# Patient Record
Sex: FEMALE | Race: WHITE | NOT HISPANIC OR LATINO
[De-identification: names, ages, dates, MRNs, and addresses within clinical notes are randomized per-mention and may not be internally consistent; named-entity substitution may affect disease eponyms.]

---

## 2017-01-06 ENCOUNTER — MESSAGE (OUTPATIENT)
Age: 77
End: 2017-01-06

## 2017-01-09 ENCOUNTER — APPOINTMENT (OUTPATIENT)
Dept: ENDOCRINOLOGY | Facility: CLINIC | Age: 77
End: 2017-01-09

## 2017-01-09 VITALS
HEART RATE: 69 BPM | WEIGHT: 122.2 LBS | SYSTOLIC BLOOD PRESSURE: 114 MMHG | BODY MASS INDEX: 24.64 KG/M2 | DIASTOLIC BLOOD PRESSURE: 70 MMHG | HEIGHT: 59 IN

## 2017-01-10 ENCOUNTER — MESSAGE (OUTPATIENT)
Age: 77
End: 2017-01-10

## 2017-03-20 ENCOUNTER — RX RENEWAL (OUTPATIENT)
Age: 77
End: 2017-03-20

## 2017-05-15 ENCOUNTER — NON-APPOINTMENT (OUTPATIENT)
Age: 77
End: 2017-05-15

## 2017-05-15 ENCOUNTER — APPOINTMENT (OUTPATIENT)
Dept: INTERNAL MEDICINE | Facility: CLINIC | Age: 77
End: 2017-05-15

## 2017-05-15 VITALS — SYSTOLIC BLOOD PRESSURE: 160 MMHG | HEART RATE: 64 BPM | DIASTOLIC BLOOD PRESSURE: 70 MMHG

## 2017-05-15 VITALS
TEMPERATURE: 98.2 F | OXYGEN SATURATION: 99 % | BODY MASS INDEX: 24.19 KG/M2 | WEIGHT: 120 LBS | HEIGHT: 59 IN | HEART RATE: 72 BPM | DIASTOLIC BLOOD PRESSURE: 70 MMHG | SYSTOLIC BLOOD PRESSURE: 140 MMHG

## 2017-05-15 VITALS — HEART RATE: 64 BPM | SYSTOLIC BLOOD PRESSURE: 120 MMHG | DIASTOLIC BLOOD PRESSURE: 70 MMHG

## 2017-05-18 ENCOUNTER — APPOINTMENT (OUTPATIENT)
Dept: NEUROLOGY | Facility: CLINIC | Age: 77
End: 2017-05-18

## 2017-05-23 ENCOUNTER — CHART COPY (OUTPATIENT)
Age: 77
End: 2017-05-23

## 2017-05-23 LAB
APPEARANCE: CLEAR
BACTERIA: NEGATIVE
BILIRUBIN URINE: NEGATIVE
BLOOD URINE: ABNORMAL
COLOR: YELLOW
GLUCOSE QUALITATIVE U: NORMAL
HYALINE CASTS: 5 /LPF
KETONES URINE: NEGATIVE
LEUKOCYTE ESTERASE URINE: NEGATIVE
MICROSCOPIC-UA: NORMAL
NITRITE URINE: NEGATIVE
PH URINE: 5
PROTEIN URINE: NEGATIVE
RED BLOOD CELLS URINE: 7 /HPF
SPECIFIC GRAVITY URINE: 1.01
SQUAMOUS EPITHELIAL CELLS: 3 /HPF
UROBILINOGEN URINE: <2
WHITE BLOOD CELLS URINE: 4 /HPF

## 2017-05-26 ENCOUNTER — OTHER (OUTPATIENT)
Age: 77
End: 2017-05-26

## 2017-05-30 LAB
APPEARANCE: CLEAR
BACTERIA: NEGATIVE
BILIRUBIN URINE: NEGATIVE
BLOOD URINE: NEGATIVE
COLOR: YELLOW
CORE LAB FLUID CYTOLOGY: NORMAL
GLUCOSE QUALITATIVE U: NORMAL MG/DL
HYALINE CASTS: 1 /LPF
KETONES URINE: NEGATIVE
LEUKOCYTE ESTERASE URINE: NEGATIVE
MICROSCOPIC-UA: NORMAL
NITRITE URINE: NEGATIVE
PH URINE: 7.5
PROTEIN URINE: NEGATIVE MG/DL
RED BLOOD CELLS URINE: 6 /HPF
SPECIFIC GRAVITY URINE: 1.02
SQUAMOUS EPITHELIAL CELLS: 1 /HPF
UROBILINOGEN URINE: NORMAL MG/DL
WHITE BLOOD CELLS URINE: 1 /HPF

## 2017-06-02 ENCOUNTER — MEDICATION RENEWAL (OUTPATIENT)
Age: 77
End: 2017-06-02

## 2017-06-07 ENCOUNTER — APPOINTMENT (OUTPATIENT)
Dept: ENDOCRINOLOGY | Facility: CLINIC | Age: 77
End: 2017-06-07

## 2017-06-07 VITALS — BODY MASS INDEX: 23.9 KG/M2 | WEIGHT: 117 LBS | HEIGHT: 58.5 IN

## 2017-06-07 VITALS — SYSTOLIC BLOOD PRESSURE: 138 MMHG | OXYGEN SATURATION: 98 % | HEART RATE: 60 BPM | DIASTOLIC BLOOD PRESSURE: 70 MMHG

## 2017-06-16 ENCOUNTER — MESSAGE (OUTPATIENT)
Age: 77
End: 2017-06-16

## 2017-06-20 ENCOUNTER — MESSAGE (OUTPATIENT)
Age: 77
End: 2017-06-20

## 2017-06-26 ENCOUNTER — APPOINTMENT (OUTPATIENT)
Dept: UROLOGY | Facility: CLINIC | Age: 77
End: 2017-06-26

## 2017-06-26 VITALS
DIASTOLIC BLOOD PRESSURE: 84 MMHG | SYSTOLIC BLOOD PRESSURE: 150 MMHG | WEIGHT: 120 LBS | BODY MASS INDEX: 24.19 KG/M2 | HEIGHT: 59 IN | HEART RATE: 66 BPM | RESPIRATION RATE: 16 BRPM

## 2017-07-11 ENCOUNTER — MEDICATION RENEWAL (OUTPATIENT)
Age: 77
End: 2017-07-11

## 2017-07-11 ENCOUNTER — FORM ENCOUNTER (OUTPATIENT)
Age: 77
End: 2017-07-11

## 2017-07-12 ENCOUNTER — APPOINTMENT (OUTPATIENT)
Dept: UROLOGY | Facility: CLINIC | Age: 77
End: 2017-07-12

## 2017-07-12 ENCOUNTER — APPOINTMENT (OUTPATIENT)
Dept: CT IMAGING | Facility: IMAGING CENTER | Age: 77
End: 2017-07-12

## 2017-07-12 ENCOUNTER — OUTPATIENT (OUTPATIENT)
Dept: OUTPATIENT SERVICES | Facility: HOSPITAL | Age: 77
LOS: 1 days | End: 2017-07-12
Payer: MEDICARE

## 2017-07-12 ENCOUNTER — OUTPATIENT (OUTPATIENT)
Dept: OUTPATIENT SERVICES | Facility: HOSPITAL | Age: 77
LOS: 1 days | End: 2017-07-12

## 2017-07-12 VITALS — SYSTOLIC BLOOD PRESSURE: 94 MMHG | HEART RATE: 76 BPM | DIASTOLIC BLOOD PRESSURE: 59 MMHG | TEMPERATURE: 97.7 F

## 2017-07-12 DIAGNOSIS — R31.29 OTHER MICROSCOPIC HEMATURIA: ICD-10-CM

## 2017-07-12 DIAGNOSIS — R35.0 FREQUENCY OF MICTURITION: ICD-10-CM

## 2017-07-12 PROCEDURE — 52000 CYSTOURETHROSCOPY: CPT

## 2017-07-12 PROCEDURE — 82565 ASSAY OF CREATININE: CPT

## 2017-07-12 PROCEDURE — 74178 CT ABD&PLV WO CNTR FLWD CNTR: CPT

## 2017-07-20 ENCOUNTER — MEDICATION RENEWAL (OUTPATIENT)
Age: 77
End: 2017-07-20

## 2017-07-27 ENCOUNTER — CLINICAL ADVICE (OUTPATIENT)
Age: 77
End: 2017-07-27

## 2017-07-27 DIAGNOSIS — R31.29 OTHER MICROSCOPIC HEMATURIA: ICD-10-CM

## 2017-08-21 ENCOUNTER — APPOINTMENT (OUTPATIENT)
Dept: INTERNAL MEDICINE | Facility: CLINIC | Age: 77
End: 2017-08-21
Payer: MEDICARE

## 2017-08-21 VITALS — HEART RATE: 76 BPM | SYSTOLIC BLOOD PRESSURE: 160 MMHG | DIASTOLIC BLOOD PRESSURE: 80 MMHG

## 2017-08-21 VITALS
DIASTOLIC BLOOD PRESSURE: 80 MMHG | SYSTOLIC BLOOD PRESSURE: 140 MMHG | HEART RATE: 70 BPM | HEIGHT: 58.5 IN | TEMPERATURE: 98.2 F | WEIGHT: 122 LBS | BODY MASS INDEX: 24.93 KG/M2 | OXYGEN SATURATION: 98 %

## 2017-08-21 VITALS — HEART RATE: 76 BPM | DIASTOLIC BLOOD PRESSURE: 70 MMHG | SYSTOLIC BLOOD PRESSURE: 164 MMHG

## 2017-08-21 PROCEDURE — 99214 OFFICE O/P EST MOD 30 MIN: CPT

## 2017-08-21 RX ORDER — CALCIUM CARBONATE 500 MG/1
500 TABLET, CHEWABLE ORAL DAILY
Qty: 60 | Refills: 0 | Status: DISCONTINUED | COMMUNITY
End: 2017-08-21

## 2017-08-21 RX ORDER — RASAGILINE MESYLATE 1 MG/1
1 TABLET ORAL
Refills: 0 | Status: DISCONTINUED | COMMUNITY
End: 2017-08-21

## 2017-08-21 RX ORDER — LACTULOSE 10 G/15ML
10 SOLUTION ORAL
Refills: 0 | Status: DISCONTINUED | COMMUNITY
End: 2017-08-21

## 2017-08-21 RX ORDER — CARBIDOPA 25 MG/1
TABLET ORAL
Refills: 0 | Status: DISCONTINUED | COMMUNITY
End: 2017-08-21

## 2017-08-21 RX ORDER — CHLORHEXIDINE GLUCONATE 4 %
LIQUID (ML) TOPICAL
Refills: 0 | Status: DISCONTINUED | COMMUNITY
End: 2017-08-21

## 2017-08-21 RX ORDER — RASAGILINE 1 MG/1
1 TABLET ORAL
Qty: 90 | Refills: 0 | Status: DISCONTINUED | COMMUNITY
Start: 2017-06-20 | End: 2017-08-21

## 2017-08-21 RX ORDER — CALCIUM CITRATE 150 MG
CAPSULE ORAL
Refills: 0 | Status: DISCONTINUED | COMMUNITY
End: 2017-08-21

## 2017-09-05 ENCOUNTER — RX RENEWAL (OUTPATIENT)
Age: 77
End: 2017-09-05

## 2017-11-05 ENCOUNTER — FORM ENCOUNTER (OUTPATIENT)
Age: 77
End: 2017-11-05

## 2017-11-06 ENCOUNTER — APPOINTMENT (OUTPATIENT)
Dept: CT IMAGING | Facility: IMAGING CENTER | Age: 77
End: 2017-11-06
Payer: MEDICARE

## 2017-11-06 ENCOUNTER — OUTPATIENT (OUTPATIENT)
Dept: OUTPATIENT SERVICES | Facility: HOSPITAL | Age: 77
LOS: 1 days | End: 2017-11-06
Payer: MEDICARE

## 2017-11-06 DIAGNOSIS — R91.1 SOLITARY PULMONARY NODULE: ICD-10-CM

## 2017-11-06 PROCEDURE — 71250 CT THORAX DX C-: CPT | Mod: 26

## 2017-11-06 PROCEDURE — 71250 CT THORAX DX C-: CPT

## 2017-11-16 ENCOUNTER — APPOINTMENT (OUTPATIENT)
Dept: NEUROLOGY | Facility: CLINIC | Age: 77
End: 2017-11-16

## 2017-12-01 ENCOUNTER — RX RENEWAL (OUTPATIENT)
Age: 77
End: 2017-12-01

## 2017-12-11 ENCOUNTER — APPOINTMENT (OUTPATIENT)
Dept: INTERNAL MEDICINE | Facility: CLINIC | Age: 77
End: 2017-12-11
Payer: MEDICARE

## 2017-12-11 VITALS — DIASTOLIC BLOOD PRESSURE: 70 MMHG | HEART RATE: 76 BPM | SYSTOLIC BLOOD PRESSURE: 130 MMHG

## 2017-12-11 VITALS
DIASTOLIC BLOOD PRESSURE: 74 MMHG | TEMPERATURE: 98.1 F | BODY MASS INDEX: 23.7 KG/M2 | OXYGEN SATURATION: 98 % | SYSTOLIC BLOOD PRESSURE: 130 MMHG | HEIGHT: 58.5 IN | WEIGHT: 116 LBS | HEART RATE: 86 BPM

## 2017-12-11 VITALS — SYSTOLIC BLOOD PRESSURE: 84 MMHG | DIASTOLIC BLOOD PRESSURE: 60 MMHG | HEART RATE: 80 BPM

## 2017-12-11 PROCEDURE — 99214 OFFICE O/P EST MOD 30 MIN: CPT | Mod: 25

## 2017-12-11 PROCEDURE — 36415 COLL VENOUS BLD VENIPUNCTURE: CPT

## 2017-12-15 LAB
ALBUMIN SERPL ELPH-MCNC: 4.3 G/DL
ALP BLD-CCNC: 38 U/L
ALT SERPL-CCNC: <4 U/L
ANION GAP SERPL CALC-SCNC: 15 MMOL/L
AST SERPL-CCNC: 28 U/L
BASOPHILS # BLD AUTO: 0.05 K/UL
BASOPHILS NFR BLD AUTO: 0.7 %
BILIRUB SERPL-MCNC: 0.5 MG/DL
BUN SERPL-MCNC: 25 MG/DL
CALCIUM SERPL-MCNC: 10 MG/DL
CHLORIDE SERPL-SCNC: 98 MMOL/L
CHOLEST SERPL-MCNC: 240 MG/DL
CHOLEST/HDLC SERPL: 2.5 RATIO
CK SERPL-CCNC: 116 U/L
CO2 SERPL-SCNC: 26 MMOL/L
CREAT SERPL-MCNC: 1.14 MG/DL
CRP SERPL-MCNC: <0.2 MG/DL
EOSINOPHIL # BLD AUTO: 0.05 K/UL
EOSINOPHIL NFR BLD AUTO: 0.7
ERYTHROCYTE [SEDIMENTATION RATE] IN BLOOD BY WESTERGREN METHOD: 15 MM/HR
GLUCOSE SERPL-MCNC: 79 MG/DL
HCT VFR BLD CALC: 42.1 %
HDLC SERPL-MCNC: 95 MG/DL
HGB BLD-MCNC: 13.3 G/DL
IMM GRANULOCYTES NFR BLD AUTO: 0.1 %
LDLC SERPL CALC-MCNC: 135 MG/DL
LYMPHOCYTES # BLD AUTO: 1.85 K/UL
LYMPHOCYTES NFR BLD AUTO: 24.8 %
MAN DIFF?: NORMAL
MCHC RBC-ENTMCNC: 30.4 PG
MCHC RBC-ENTMCNC: 31.6 GM/DL
MCV RBC AUTO: 96.1 FL
MONOCYTES # BLD AUTO: 0.49 K/UL
MONOCYTES NFR BLD AUTO: 6.6 %
NEUTROPHILS # BLD AUTO: 5 K/UL
NEUTROPHILS NFR BLD AUTO: 67.1 %
PLATELET # BLD AUTO: 247 K/UL
POTASSIUM SERPL-SCNC: 4.1 MMOL/L
PROT SERPL-MCNC: 7.8 G/DL
RBC # BLD: 4.38 M/UL
RBC # FLD: 14.3 %
SODIUM SERPL-SCNC: 139 MMOL/L
TRIGL SERPL-MCNC: 49 MG/DL
TSH SERPL-ACNC: 0.36 UIU/ML
WBC # FLD AUTO: 7.45 K/UL

## 2018-01-08 ENCOUNTER — MEDICATION RENEWAL (OUTPATIENT)
Age: 78
End: 2018-01-08

## 2018-01-09 ENCOUNTER — MEDICATION RENEWAL (OUTPATIENT)
Age: 78
End: 2018-01-09

## 2018-02-20 ENCOUNTER — MEDICATION RENEWAL (OUTPATIENT)
Age: 78
End: 2018-02-20

## 2018-03-28 ENCOUNTER — MEDICATION RENEWAL (OUTPATIENT)
Age: 78
End: 2018-03-28

## 2018-05-11 ENCOUNTER — RX RENEWAL (OUTPATIENT)
Age: 78
End: 2018-05-11

## 2018-05-17 ENCOUNTER — APPOINTMENT (OUTPATIENT)
Dept: INTERNAL MEDICINE | Facility: CLINIC | Age: 78
End: 2018-05-17
Payer: MEDICARE

## 2018-05-17 ENCOUNTER — LABORATORY RESULT (OUTPATIENT)
Age: 78
End: 2018-05-17

## 2018-05-17 ENCOUNTER — NON-APPOINTMENT (OUTPATIENT)
Age: 78
End: 2018-05-17

## 2018-05-17 VITALS
TEMPERATURE: 98.2 F | SYSTOLIC BLOOD PRESSURE: 120 MMHG | OXYGEN SATURATION: 98 % | WEIGHT: 116 LBS | BODY MASS INDEX: 23.39 KG/M2 | HEIGHT: 59 IN | HEART RATE: 68 BPM | DIASTOLIC BLOOD PRESSURE: 68 MMHG

## 2018-05-17 VITALS — SYSTOLIC BLOOD PRESSURE: 110 MMHG | DIASTOLIC BLOOD PRESSURE: 70 MMHG | HEART RATE: 64 BPM

## 2018-05-17 VITALS — SYSTOLIC BLOOD PRESSURE: 140 MMHG | DIASTOLIC BLOOD PRESSURE: 80 MMHG | HEART RATE: 68 BPM

## 2018-05-17 VITALS — HEART RATE: 76 BPM | DIASTOLIC BLOOD PRESSURE: 76 MMHG | SYSTOLIC BLOOD PRESSURE: 130 MMHG

## 2018-05-17 DIAGNOSIS — R06.09 OTHER FORMS OF DYSPNEA: ICD-10-CM

## 2018-05-17 DIAGNOSIS — R03.0 ELEVATED BLOOD-PRESSURE READING, W/OUT DIAGNOSIS OF HYPERTENSION: ICD-10-CM

## 2018-05-17 DIAGNOSIS — Z87.898 PERSONAL HISTORY OF OTHER SPECIFIED CONDITIONS: ICD-10-CM

## 2018-05-17 DIAGNOSIS — R31.29 OTHER MICROSCOPIC HEMATURIA: ICD-10-CM

## 2018-05-17 LAB — HBA1C MFR BLD HPLC: 5.8

## 2018-05-17 PROCEDURE — 36415 COLL VENOUS BLD VENIPUNCTURE: CPT

## 2018-05-17 PROCEDURE — 83036 HEMOGLOBIN GLYCOSYLATED A1C: CPT | Mod: QW

## 2018-05-17 PROCEDURE — G0444 DEPRESSION SCREEN ANNUAL: CPT | Mod: 59

## 2018-05-17 PROCEDURE — G0439: CPT

## 2018-05-17 PROCEDURE — 93000 ELECTROCARDIOGRAM COMPLETE: CPT | Mod: 59

## 2018-05-17 PROCEDURE — 99214 OFFICE O/P EST MOD 30 MIN: CPT | Mod: 25

## 2018-05-18 LAB
APPEARANCE: CLEAR
BACTERIA: NEGATIVE
BILIRUBIN URINE: NEGATIVE
BLOOD URINE: NEGATIVE
COLOR: YELLOW
GLUCOSE QUALITATIVE U: NEGATIVE MG/DL
HYALINE CASTS: 1 /LPF
KETONES URINE: ABNORMAL
LEUKOCYTE ESTERASE URINE: NEGATIVE
MICROSCOPIC-UA: NORMAL
NITRITE URINE: NEGATIVE
PH URINE: 6
PROTEIN URINE: ABNORMAL MG/DL
RED BLOOD CELLS URINE: 8 /HPF
SPECIFIC GRAVITY URINE: 1.02
SQUAMOUS EPITHELIAL CELLS: 2 /HPF
UROBILINOGEN URINE: NEGATIVE MG/DL
WHITE BLOOD CELLS URINE: 3 /HPF

## 2018-05-20 PROBLEM — R03.0 BLOOD PRESSURE ELEVATED WITHOUT HISTORY OF HTN: Status: RESOLVED | Noted: 2017-08-21 | Resolved: 2018-05-20

## 2018-05-20 PROBLEM — R31.29 MICROSCOPIC HEMATURIA: Status: ACTIVE | Noted: 2017-05-23

## 2018-05-20 RX ORDER — MULTIVIT-MIN/FOLIC ACID/LUTEIN 400-250MCG
TABLET,CHEWABLE ORAL
Refills: 0 | Status: DISCONTINUED | COMMUNITY
End: 2018-05-20

## 2018-05-20 NOTE — HEALTH RISK ASSESSMENT
[0] : 2) Feeling down, depressed, or hopeless: Not at all (0) [Fully functional (bathing, dressing, toileting, transferring, walking, feeding)] : Fully functional (bathing, dressing, toileting, transferring, walking, feeding) [Fully functional (using the telephone, shopping, preparing meals, housekeeping, doing laundry, using] : Fully functional and needs no help or supervision to perform IADLs (using the telephone, shopping, preparing meals, housekeeping, doing laundry, using transportation, managing medications and managing finances) [Reports changes in vision] : Reports changes in vision [Smoke Detector] : smoke detector [Carbon Monoxide Detector] : carbon monoxide detector [Seat Belt] :  uses seat belt [Sunscreen] : uses sunscreen [Travel to Developing Areas] : travel to developing areas [Discussed at today's visit] : Advance Directives Discussed at today's visit [Designated Healthcare Proxy] : Designated healthcare proxy [Name: ___] : Health Care Proxy's Name: [unfilled]  [Relationship: ___] : Relationship: [unfilled] [No falls in past year] : Patient reported no falls in the past year [Patient reported PAP Smear was normal] : Patient reported PAP Smear was normal [] : No [de-identified] : social [[id number]] : 0 [Reports changes in hearing] : Reports no changes in hearing [Reports changes in dental health] : Reports no changes in dental health [Guns at Home] : no guns at home [MammogramDate] : 6/2017 [MammogramComments] : birads 1 [PapSmearDate] : 2017 [BoneDensityDate] : 6/2017 [BoneDensityComments] : stable osteopenia [ColonoscopyDate] : 6/2015 [ColonoscopyComments] : no polyps

## 2018-05-20 NOTE — PHYSICAL EXAM
[No Acute Distress] : no acute distress [Well Nourished] : well nourished [Well Developed] : well developed [Well-Appearing] : well-appearing [Normal Voice/Communication] : normal voice/communication [Normal Sclera/Conjunctiva] : normal sclera/conjunctiva [PERRL] : pupils equal round and reactive to light [EOMI] : extraocular movements intact [Normal Outer Ear/Nose] : the outer ears and nose were normal in appearance [Normal Oropharynx] : the oropharynx was normal [Normal TMs] : both tympanic membranes were normal [No JVD] : no jugular venous distention [No Lymphadenopathy] : no lymphadenopathy [Thyroid Normal, No Nodules] : the thyroid was normal and there were no nodules present [No Respiratory Distress] : no respiratory distress  [Clear to Auscultation] : lungs were clear to auscultation bilaterally [No Accessory Muscle Use] : no accessory muscle use [Normal Percussion] : the chest was normal to percussion [Normal Rate] : normal rate  [Regular Rhythm] : with a regular rhythm [Normal S1, S2] : normal S1 and S2 [No Murmur] : no murmur heard [No Carotid Bruits] : no carotid bruits [No Abdominal Bruit] : a ~M bruit was not heard ~T in the abdomen [No Varicosities] : no varicosities [Pedal Pulses Present] : the pedal pulses are present [No Edema] : there was no peripheral edema [No Extremity Clubbing/Cyanosis] : no extremity clubbing/cyanosis [No Palpable Aorta] : no palpable aorta [No Nipple Discharge] : no nipple discharge [Soft] : abdomen soft [Non Tender] : non-tender [Non-distended] : non-distended [No Masses] : no abdominal mass palpated [No HSM] : no HSM [Normal Bowel Sounds] : normal bowel sounds [Normal Supraclavicular Nodes] : no supraclavicular lymphadenopathy [Normal Axillary Nodes] : no axillary lymphadenopathy [Normal Posterior Cervical Nodes] : no posterior cervical lymphadenopathy [Normal Anterior Cervical Nodes] : no anterior cervical lymphadenopathy [Normal Inguinal Nodes] : no inguinal lymphadenopathy [No CVA Tenderness] : no CVA  tenderness [No Spinal Tenderness] : no spinal tenderness [No Joint Swelling] : no joint swelling [Grossly Normal Strength/Tone] : grossly normal strength/tone [No Rash] : no rash [Normal Gait] : normal gait [Coordination Grossly Intact] : coordination grossly intact [Speech Grossly Normal] : speech grossly normal [Memory Grossly Normal] : memory grossly normal [Normal Affect] : the affect was normal [Alert and Oriented x3] : oriented to person, place, and time [Normal Mood] : the mood was normal [Normal Insight/Judgement] : insight and judgment were intact [Normal Appearance] : normal in appearance [No Axillary Lymphadenopathy] : no axillary lymphadenopathy [de-identified] : Sebaceous cysts and  seborrheic keratoses of back [de-identified] : Masked facies.  No tremor or rigidity.  Turns en bloc

## 2018-05-20 NOTE — HISTORY OF PRESENT ILLNESS
[FreeTextEntry1] : Annual wellness visit\par Orthostatic hypotension\par Hyperlipidemia\par Hypothyroidism\par Morning stiffness\par Parkinsons [de-identified] : Patient complaints of feeling achy when she first gets up in the morning mostly in  proximal joints and muscles. It resolves in an hour or  so. Her hands and wrists are generally  okay.   There is no joint triggering or swelling. She exercises with boxing twice a week and walking and stretching but doesn't feel the symptoms are related to that .  There have  been no falls.   She gets occasional cramps in her arms or legs her  she moves a certain way or stretches.  She generally sleeps well and eats a healthy diet .  Sometimes she can feel a little lightheaded and takes her standing blood pressure and it is low at 70 and she stays home and drinks.  She tries to have a high salt diet.She does wear support stockings regularly. She sees ophthalmology for dry eyes.  \par Her bowels are good on lactulose b.i.d. Her hearing is fine.\par She sees neurology for her Parkinsons disease.  She feels this is stable.  She remains independent with all activities and driving.

## 2018-05-20 NOTE — ASSESSMENT
[FreeTextEntry1] : Patient remains orthostatic but asymptomatic. She was encouraged to continue with support stockings maintain hydration and have high salt intake. She will followup with neurology for Parkinson's disease. In terms of her morning stiffness and history of Raynaud's rheumatological workup will be sent.\par She is clinically euthyroid thyroid function tests were sent. Her HGBA1c is stable and in the prediabetic range.  she will continue to watch sweets in her diet.  A lipid panel was sent. \par She was given a referral for her yearly mammogram. She will follow up with endocrinology for her osteopenia.  She has been on alendronate since June 2015 . We discussed the Shingrix  vaccine and she was given a vaccine information sheet and we'll check her  insurance coverage and return here to the pharmacy. Advanced  directives reviewed and the patient has them in place .\par She will be seen again in 6 months.

## 2018-05-29 LAB
25(OH)D3 SERPL-MCNC: 53.5 NG/ML
ALBUMIN SERPL ELPH-MCNC: 4 G/DL
ALP BLD-CCNC: 80 U/L
ALT SERPL-CCNC: 22 U/L
ANA PAT FLD IF-IMP: ABNORMAL
ANA SER IF-ACNC: ABNORMAL
ANION GAP SERPL CALC-SCNC: 14 MMOL/L
AST SERPL-CCNC: 24 U/L
B BURGDOR IGG+IGM SER QL IB: NORMAL
BASOPHILS # BLD AUTO: 0.04 K/UL
BASOPHILS NFR BLD AUTO: 0.6 %
BILIRUB SERPL-MCNC: 0.3 MG/DL
BUN SERPL-MCNC: 20 MG/DL
CALCIUM SERPL-MCNC: 9.9 MG/DL
CCP AB SER IA-ACNC: <8 UNITS
CHLORIDE SERPL-SCNC: 104 MMOL/L
CHOLEST SERPL-MCNC: 231 MG/DL
CHOLEST/HDLC SERPL: 2.5 RATIO
CK SERPL-CCNC: 75 U/L
CO2 SERPL-SCNC: 21 MMOL/L
CREAT SERPL-MCNC: 0.95 MG/DL
CRP SERPL-MCNC: <0.2 MG/DL
ENA SCL70 IGG SER IA-ACNC: <0.2 AL
EOSINOPHIL # BLD AUTO: 0.09 K/UL
EOSINOPHIL NFR BLD AUTO: 1.4 %
ERYTHROCYTE [SEDIMENTATION RATE] IN BLOOD BY WESTERGREN METHOD: 19 MM/HR
GLUCOSE SERPL-MCNC: 97 MG/DL
HCT VFR BLD CALC: 44.4 %
HDLC SERPL-MCNC: 94 MG/DL
HGB BLD-MCNC: 14.1 G/DL
IMM GRANULOCYTES NFR BLD AUTO: 0.2 %
LDLC SERPL CALC-MCNC: 121 MG/DL
LYMPHOCYTES # BLD AUTO: 2.08 K/UL
LYMPHOCYTES NFR BLD AUTO: 33.4 %
MAN DIFF?: NORMAL
MCHC RBC-ENTMCNC: 30.7 PG
MCHC RBC-ENTMCNC: 31.8 GM/DL
MCV RBC AUTO: 96.7 FL
MONOCYTES # BLD AUTO: 0.74 K/UL
MONOCYTES NFR BLD AUTO: 11.9 %
NEUTROPHILS # BLD AUTO: 3.26 K/UL
NEUTROPHILS NFR BLD AUTO: 52.5 %
PLATELET # BLD AUTO: 250 K/UL
POTASSIUM SERPL-SCNC: 4.1 MMOL/L
PROT SERPL-MCNC: 7.1 G/DL
RBC # BLD: 4.59 M/UL
RBC # FLD: 15.5 %
RF+CCP IGG SER-IMP: NEGATIVE
RHEUMATOID FACT SER QL: <7 IU/ML
SODIUM SERPL-SCNC: 139 MMOL/L
T3RU NFR SERPL: 1.04 INDEX
T4 SERPL-MCNC: 7.7 UG/DL
TRIGL SERPL-MCNC: 80 MG/DL
TSH SERPL-ACNC: 4.62 UIU/ML
WBC # FLD AUTO: 6.22 K/UL

## 2018-06-01 ENCOUNTER — MEDICATION RENEWAL (OUTPATIENT)
Age: 78
End: 2018-06-01

## 2018-06-08 ENCOUNTER — APPOINTMENT (OUTPATIENT)
Dept: ENDOCRINOLOGY | Facility: CLINIC | Age: 78
End: 2018-06-08
Payer: MEDICARE

## 2018-06-08 VITALS
BODY MASS INDEX: 23.39 KG/M2 | SYSTOLIC BLOOD PRESSURE: 125 MMHG | OXYGEN SATURATION: 97 % | DIASTOLIC BLOOD PRESSURE: 82 MMHG | HEIGHT: 59 IN | HEART RATE: 75 BPM | WEIGHT: 116 LBS

## 2018-06-08 PROCEDURE — 99214 OFFICE O/P EST MOD 30 MIN: CPT

## 2018-06-25 ENCOUNTER — APPOINTMENT (OUTPATIENT)
Dept: ORTHOPEDIC SURGERY | Facility: CLINIC | Age: 78
End: 2018-06-25
Payer: MEDICARE

## 2018-06-25 VITALS — DIASTOLIC BLOOD PRESSURE: 75 MMHG | HEART RATE: 72 BPM | SYSTOLIC BLOOD PRESSURE: 159 MMHG

## 2018-06-25 PROCEDURE — 20610 DRAIN/INJ JOINT/BURSA W/O US: CPT | Mod: RT

## 2018-06-25 PROCEDURE — 99203 OFFICE O/P NEW LOW 30 MIN: CPT | Mod: 25

## 2018-06-25 PROCEDURE — 73030 X-RAY EXAM OF SHOULDER: CPT | Mod: RT

## 2018-07-08 ENCOUNTER — RX RENEWAL (OUTPATIENT)
Age: 78
End: 2018-07-08

## 2018-09-07 ENCOUNTER — LABORATORY RESULT (OUTPATIENT)
Age: 78
End: 2018-09-07

## 2018-09-07 ENCOUNTER — APPOINTMENT (OUTPATIENT)
Dept: INTERNAL MEDICINE | Facility: CLINIC | Age: 78
End: 2018-09-07
Payer: MEDICARE

## 2018-09-07 VITALS
DIASTOLIC BLOOD PRESSURE: 80 MMHG | HEIGHT: 58.5 IN | RESPIRATION RATE: 16 BRPM | OXYGEN SATURATION: 99 % | TEMPERATURE: 98.1 F | SYSTOLIC BLOOD PRESSURE: 160 MMHG | HEART RATE: 71 BPM | BODY MASS INDEX: 24.11 KG/M2 | WEIGHT: 118 LBS

## 2018-09-07 VITALS — SYSTOLIC BLOOD PRESSURE: 190 MMHG | DIASTOLIC BLOOD PRESSURE: 90 MMHG

## 2018-09-07 DIAGNOSIS — L60.8 OTHER NAIL DISORDERS: ICD-10-CM

## 2018-09-07 DIAGNOSIS — I73.00 RAYNAUD'S SYNDROME W/OUT GANGRENE: ICD-10-CM

## 2018-09-07 PROCEDURE — 99214 OFFICE O/P EST MOD 30 MIN: CPT | Mod: 25

## 2018-09-07 PROCEDURE — 36415 COLL VENOUS BLD VENIPUNCTURE: CPT

## 2018-09-10 PROBLEM — I73.00 RAYNAUD'S PHENOMENON WITHOUT GANGRENE: Status: ACTIVE | Noted: 2018-05-17

## 2018-09-10 RX ORDER — CHLORHEXIDINE GLUCONATE, 0.12% ORAL RINSE 1.2 MG/ML
0.12 SOLUTION DENTAL
Qty: 473 | Refills: 0 | Status: COMPLETED | COMMUNITY
Start: 2018-04-10

## 2018-09-10 NOTE — PHYSICAL EXAM
[No Acute Distress] : no acute distress [Well Nourished] : well nourished [Well Developed] : well developed [Well-Appearing] : well-appearing [Normal Voice/Communication] : normal voice/communication [Normal Sclera/Conjunctiva] : normal sclera/conjunctiva [PERRL] : pupils equal round and reactive to light [EOMI] : extraocular movements intact [Normal Outer Ear/Nose] : the outer ears and nose were normal in appearance [Normal Oropharynx] : the oropharynx was normal [Normal TMs] : both tympanic membranes were normal [No JVD] : no jugular venous distention [No Lymphadenopathy] : no lymphadenopathy [Thyroid Normal, No Nodules] : the thyroid was normal and there were no nodules present [No Respiratory Distress] : no respiratory distress  [Clear to Auscultation] : lungs were clear to auscultation bilaterally [No Accessory Muscle Use] : no accessory muscle use [Normal Percussion] : the chest was normal to percussion [Normal Rate] : normal rate  [Regular Rhythm] : with a regular rhythm [Normal S1, S2] : normal S1 and S2 [No Murmur] : no murmur heard [No Carotid Bruits] : no carotid bruits [No Abdominal Bruit] : a ~M bruit was not heard ~T in the abdomen [No Varicosities] : no varicosities [Pedal Pulses Present] : the pedal pulses are present [No Edema] : there was no peripheral edema [No Extremity Clubbing/Cyanosis] : no extremity clubbing/cyanosis [No Palpable Aorta] : no palpable aorta [Normal Appearance] : normal in appearance [No Nipple Discharge] : no nipple discharge [No Axillary Lymphadenopathy] : no axillary lymphadenopathy [Soft] : abdomen soft [Non Tender] : non-tender [Non-distended] : non-distended [No Masses] : no abdominal mass palpated [No HSM] : no HSM [Normal Bowel Sounds] : normal bowel sounds [Normal Supraclavicular Nodes] : no supraclavicular lymphadenopathy [Normal Axillary Nodes] : no axillary lymphadenopathy [Normal Posterior Cervical Nodes] : no posterior cervical lymphadenopathy [Normal Anterior Cervical Nodes] : no anterior cervical lymphadenopathy [Normal Inguinal Nodes] : no inguinal lymphadenopathy [No CVA Tenderness] : no CVA  tenderness [No Spinal Tenderness] : no spinal tenderness [No Joint Swelling] : no joint swelling [Grossly Normal Strength/Tone] : grossly normal strength/tone [No Rash] : no rash [Normal Gait] : normal gait [Coordination Grossly Intact] : coordination grossly intact [Speech Grossly Normal] : speech grossly normal [Memory Grossly Normal] : memory grossly normal [Normal Affect] : the affect was normal [Alert and Oriented x3] : oriented to person, place, and time [Normal Mood] : the mood was normal [Normal Insight/Judgement] : insight and judgment were intact [No Focal Deficits] : no focal deficits [Deep Tendon Reflexes (DTR)] : deep tendon reflexes were 2+ and symmetric [de-identified] : Sebaceous cysts and  seborrheic keratoses of back [de-identified] : Masked facies.  No tremor or rigidity.  Turns en bloc

## 2018-09-10 NOTE — ASSESSMENT
[FreeTextEntry1] : She  is not orthostatic but she is hypertensive. We'll begin amlodipine 5 mg daily. She will limit salt in her diet.   She'll be reevaluated in a week. Repeat thyroid functions weren't sent as well as testing for connective tissue diseases due to her positive MARK

## 2018-09-10 NOTE — HISTORY OF PRESENT ILLNESS
[FreeTextEntry1] : Hypothyroidism\par Orthostatic hypotension\par Raynaud's syndrome\par Positive MARK [de-identified] : She comes here after her boxing workout for Parkinson's. She states she has volunteered for a study at [facility] that photographs her retina every 6 months to see if this changes with Parkinson's. They asked her about her hands turning blue and she was told that this may be related to the Parkinson's. She occasionally takes her blood pressure at home standing  and it is low rarely into the 70s but usually . She never takes her blood pressure sitting. She tries to drink a lot of fluids and  have a lot of salt in her diet .  She is not wearing the support stockings. There have been no falls.  She remains independent with all activities of daily living.  She has no chest pain, dyspnea, edema. \par She saw orthopedist for right shoulder bursitis and received a cortisone injection with good results.

## 2018-09-14 ENCOUNTER — APPOINTMENT (OUTPATIENT)
Dept: INTERNAL MEDICINE | Facility: CLINIC | Age: 78
End: 2018-09-14
Payer: MEDICARE

## 2018-09-14 VITALS — HEART RATE: 80 BPM | SYSTOLIC BLOOD PRESSURE: 140 MMHG | DIASTOLIC BLOOD PRESSURE: 70 MMHG

## 2018-09-14 VITALS
HEART RATE: 75 BPM | WEIGHT: 118 LBS | OXYGEN SATURATION: 97 % | HEIGHT: 59 IN | TEMPERATURE: 98 F | DIASTOLIC BLOOD PRESSURE: 70 MMHG | SYSTOLIC BLOOD PRESSURE: 120 MMHG | BODY MASS INDEX: 23.79 KG/M2

## 2018-09-14 VITALS — SYSTOLIC BLOOD PRESSURE: 100 MMHG | DIASTOLIC BLOOD PRESSURE: 58 MMHG

## 2018-09-14 DIAGNOSIS — G47.52 REM SLEEP BEHAVIOR DISORDER: ICD-10-CM

## 2018-09-14 LAB
ALBUMIN SERPL ELPH-MCNC: 4.4 G/DL
ALP BLD-CCNC: 59 U/L
ALT SERPL-CCNC: 12 U/L
ANA SER IF-ACNC: NEGATIVE
ANION GAP SERPL CALC-SCNC: 16 MMOL/L
AST SERPL-CCNC: 43 U/L
BASOPHILS # BLD AUTO: 0.04 K/UL
BASOPHILS NFR BLD AUTO: 0.6 %
BILIRUB SERPL-MCNC: 0.4 MG/DL
BUN SERPL-MCNC: 19 MG/DL
CALCIUM SERPL-MCNC: 10.2 MG/DL
CHLORIDE SERPL-SCNC: 99 MMOL/L
CO2 SERPL-SCNC: 25 MMOL/L
CREAT SERPL-MCNC: 0.81 MG/DL
DSDNA AB SER-ACNC: <12 IU/ML
ENA RNP AB SER IA-ACNC: 1.2 AL
ENA SCL70 IGG SER IA-ACNC: <0.2 AL
ENA SM AB SER IA-ACNC: <0.2 AL
ENA SS-A AB SER IA-ACNC: <0.2 AL
ENA SS-B AB SER IA-ACNC: <0.2 AL
EOSINOPHIL # BLD AUTO: 0.04 K/UL
EOSINOPHIL NFR BLD AUTO: 0.6 %
GLUCOSE SERPL-MCNC: 97 MG/DL
HCT VFR BLD CALC: 44.7 %
HGB BLD-MCNC: 14.5 G/DL
IMM GRANULOCYTES NFR BLD AUTO: 0.3 %
LYMPHOCYTES # BLD AUTO: 1.5 K/UL
LYMPHOCYTES NFR BLD AUTO: 22.6 %
MAN DIFF?: NORMAL
MCHC RBC-ENTMCNC: 31.5 PG
MCHC RBC-ENTMCNC: 32.4 GM/DL
MCV RBC AUTO: 97.2 FL
MONOCYTES # BLD AUTO: 0.47 K/UL
MONOCYTES NFR BLD AUTO: 7.1 %
NEUTROPHILS # BLD AUTO: 4.57 K/UL
NEUTROPHILS NFR BLD AUTO: 68.8 %
PLATELET # BLD AUTO: 213 K/UL
POTASSIUM SERPL-SCNC: 5 MMOL/L
PROT SERPL-MCNC: 7.7 G/DL
RBC # BLD: 4.6 M/UL
RBC # FLD: 15.2 %
SODIUM SERPL-SCNC: 140 MMOL/L
T3RU NFR SERPL: 0.97 INDEX
T4 SERPL-MCNC: 8.4 UG/DL
TSH SERPL-ACNC: 0.48 UIU/ML
WBC # FLD AUTO: 6.64 K/UL

## 2018-09-14 PROCEDURE — 99214 OFFICE O/P EST MOD 30 MIN: CPT | Mod: 25

## 2018-09-14 PROCEDURE — G0008: CPT

## 2018-09-14 PROCEDURE — 90662 IIV NO PRSV INCREASED AG IM: CPT

## 2018-09-15 NOTE — HISTORY OF PRESENT ILLNESS
[FreeTextEntry1] : Accelerated hypertension with history of Orthostatic hypotension\par Parkinson's disease\par Positive MARK\par Fall from bed\par  [de-identified] : She has been tolerating amlodipine without edema or constipation. She fell out of bed  injured the right foot. This is better. She has fell out of bed  bed 6 times over the past 3 years. Neurology had recommended Klonopin but patient does not want to do this. She has been monitoring her pressure at home and sometimes it is about 100 systolic standing. When she is sitting or lying she  is sometimes getting readings in the 170s.

## 2018-09-15 NOTE — PHYSICAL EXAM
[No Acute Distress] : no acute distress [Well Nourished] : well nourished [Well Developed] : well developed [Well-Appearing] : well-appearing [Normal Sclera/Conjunctiva] : normal sclera/conjunctiva [PERRL] : pupils equal round and reactive to light [EOMI] : extraocular movements intact [Normal Outer Ear/Nose] : the outer ears and nose were normal in appearance [Normal Oropharynx] : the oropharynx was normal [No JVD] : no jugular venous distention [Supple] : supple [No Lymphadenopathy] : no lymphadenopathy [Thyroid Normal, No Nodules] : the thyroid was normal and there were no nodules present [No Respiratory Distress] : no respiratory distress  [Clear to Auscultation] : lungs were clear to auscultation bilaterally [No Accessory Muscle Use] : no accessory muscle use [Normal Rate] : normal rate  [Regular Rhythm] : with a regular rhythm [Normal S1, S2] : normal S1 and S2 [No Murmur] : no murmur heard [No Carotid Bruits] : no carotid bruits [No Edema] : there was no peripheral edema [Soft] : abdomen soft [Non Tender] : non-tender [Non-distended] : non-distended [No Masses] : no abdominal mass palpated [No HSM] : no HSM [Normal Bowel Sounds] : normal bowel sounds [Normal Posterior Cervical Nodes] : no posterior cervical lymphadenopathy [Normal Anterior Cervical Nodes] : no anterior cervical lymphadenopathy [No CVA Tenderness] : no CVA  tenderness [No Spinal Tenderness] : no spinal tenderness [No Joint Swelling] : no joint swelling [Grossly Normal Strength/Tone] : grossly normal strength/tone [No Rash] : no rash [Normal Affect] : the affect was normal [Normal Insight/Judgement] : insight and judgment were intact [Normal Voice/Communication] : normal voice/communication [Normal Supraclavicular Nodes] : no supraclavicular lymphadenopathy [Speech Grossly Normal] : speech grossly normal [Memory Grossly Normal] : memory grossly normal [Alert and Oriented x3] : oriented to person, place, and time [Normal Mood] : the mood was normal [de-identified] : minimal ecchymoses over distal lateral right foot.  Not swollen or tender

## 2018-09-15 NOTE — ASSESSMENT
[FreeTextEntry1] : Patient remains orthostatic but her blood pressure is controlled on amlodipine  She was advised to wear support stockings. She will continue with  adequate hydration but advised not to intentionally salt load. \par Her blood work from September 7 2018 was reviewed with her. Her thyroid is fine and  she will stay on the same dose of Synthroid. 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 We discussed her positive RNP serology and I talked about having her go to see rheumatology. We discussed the possibility of some collagen vascular disease. She requests that we hold off on this  and get repeat blood work with her physical in May. I told her I would like to see her again in 3 months for reevaluation and we'll repeat the blood work then.\par She was given a high dose influenza vaccine.\par Thankfully she has not had any serious injury from her fall from bed.  She will follow up with neurology for her sleep disorder.

## 2018-10-16 ENCOUNTER — APPOINTMENT (OUTPATIENT)
Age: 78
End: 2018-10-16
Payer: MEDICARE

## 2018-10-16 DIAGNOSIS — Z80.9 FAMILY HISTORY OF MALIGNANT NEOPLASM, UNSPECIFIED: ICD-10-CM

## 2018-10-16 PROCEDURE — 99213 OFFICE O/P EST LOW 20 MIN: CPT

## 2018-10-16 PROCEDURE — 73502 X-RAY EXAM HIP UNI 2-3 VIEWS: CPT | Mod: LT

## 2018-10-24 ENCOUNTER — APPOINTMENT (OUTPATIENT)
Dept: ORTHOPEDIC SURGERY | Facility: CLINIC | Age: 78
End: 2018-10-24
Payer: MEDICARE

## 2018-10-24 ENCOUNTER — RX RENEWAL (OUTPATIENT)
Age: 78
End: 2018-10-24

## 2018-10-24 VITALS
DIASTOLIC BLOOD PRESSURE: 72 MMHG | HEART RATE: 80 BPM | BODY MASS INDEX: 23.23 KG/M2 | SYSTOLIC BLOOD PRESSURE: 124 MMHG | WEIGHT: 115 LBS

## 2018-10-24 PROCEDURE — 99214 OFFICE O/P EST MOD 30 MIN: CPT

## 2018-10-24 PROCEDURE — 73502 X-RAY EXAM HIP UNI 2-3 VIEWS: CPT | Mod: LT

## 2018-10-29 ENCOUNTER — APPOINTMENT (OUTPATIENT)
Dept: ORTHOPEDIC SURGERY | Facility: CLINIC | Age: 78
End: 2018-10-29
Payer: MEDICARE

## 2018-10-29 VITALS — HEART RATE: 76 BPM | DIASTOLIC BLOOD PRESSURE: 73 MMHG | SYSTOLIC BLOOD PRESSURE: 125 MMHG

## 2018-10-29 VITALS — WEIGHT: 115 LBS | BODY MASS INDEX: 23.18 KG/M2 | HEIGHT: 59 IN

## 2018-10-29 PROCEDURE — 99213 OFFICE O/P EST LOW 20 MIN: CPT | Mod: 25

## 2018-10-29 PROCEDURE — 20611 DRAIN/INJ JOINT/BURSA W/US: CPT | Mod: LT

## 2018-11-10 ENCOUNTER — FORM ENCOUNTER (OUTPATIENT)
Age: 78
End: 2018-11-10

## 2018-11-11 ENCOUNTER — APPOINTMENT (OUTPATIENT)
Dept: MRI IMAGING | Facility: CLINIC | Age: 78
End: 2018-11-11
Payer: MEDICARE

## 2018-11-11 ENCOUNTER — OUTPATIENT (OUTPATIENT)
Dept: OUTPATIENT SERVICES | Facility: HOSPITAL | Age: 78
LOS: 1 days | End: 2018-11-11
Payer: MEDICARE

## 2018-11-11 DIAGNOSIS — Z00.8 ENCOUNTER FOR OTHER GENERAL EXAMINATION: ICD-10-CM

## 2018-11-11 PROCEDURE — 72148 MRI LUMBAR SPINE W/O DYE: CPT

## 2018-11-11 PROCEDURE — 72148 MRI LUMBAR SPINE W/O DYE: CPT | Mod: 26

## 2018-11-12 ENCOUNTER — OTHER (OUTPATIENT)
Age: 78
End: 2018-11-12

## 2018-11-13 ENCOUNTER — RX RENEWAL (OUTPATIENT)
Age: 78
End: 2018-11-13

## 2018-11-15 ENCOUNTER — RESULT REVIEW (OUTPATIENT)
Age: 78
End: 2018-11-15

## 2018-11-15 ENCOUNTER — APPOINTMENT (OUTPATIENT)
Dept: INTERNAL MEDICINE | Facility: CLINIC | Age: 78
End: 2018-11-15
Payer: MEDICARE

## 2018-11-15 VITALS — HEART RATE: 80 BPM | SYSTOLIC BLOOD PRESSURE: 130 MMHG | DIASTOLIC BLOOD PRESSURE: 70 MMHG

## 2018-11-15 VITALS — SYSTOLIC BLOOD PRESSURE: 120 MMHG | DIASTOLIC BLOOD PRESSURE: 70 MMHG | HEART RATE: 80 BPM

## 2018-11-15 VITALS
BODY MASS INDEX: 24.19 KG/M2 | SYSTOLIC BLOOD PRESSURE: 120 MMHG | WEIGHT: 120 LBS | TEMPERATURE: 97.5 F | HEART RATE: 80 BPM | OXYGEN SATURATION: 96 % | HEIGHT: 59 IN | DIASTOLIC BLOOD PRESSURE: 60 MMHG
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
## 2018-11-15 PROCEDURE — 99214 OFFICE O/P EST MOD 30 MIN: CPT | Mod: 25

## 2018-11-15 PROCEDURE — 36415 COLL VENOUS BLD VENIPUNCTURE: CPT

## 2018-11-15 RX ORDER — MELOXICAM 15 MG/1
15 TABLET ORAL DAILY
Qty: 90 | Refills: 2 | Status: DISCONTINUED | COMMUNITY
Start: 2018-10-24 | End: 2018-11-15

## 2018-11-15 RX ORDER — METHYLPREDNISOLONE 4 MG/1
4 TABLET ORAL
Qty: 1 | Refills: 0 | Status: DISCONTINUED | COMMUNITY
Start: 2018-10-16 | End: 2018-11-15

## 2018-11-15 NOTE — PHYSICAL EXAM
[No Acute Distress] : no acute distress [Well Nourished] : well nourished [Well Developed] : well developed [Well-Appearing] : well-appearing [Normal Voice/Communication] : normal voice/communication [Normal Sclera/Conjunctiva] : normal sclera/conjunctiva [PERRL] : pupils equal round and reactive to light [EOMI] : extraocular movements intact [Normal Outer Ear/Nose] : the outer ears and nose were normal in appearance [Normal Oropharynx] : the oropharynx was normal [No JVD] : no jugular venous distention [Supple] : supple [No Lymphadenopathy] : no lymphadenopathy [Thyroid Normal, No Nodules] : the thyroid was normal and there were no nodules present [No Respiratory Distress] : no respiratory distress  [Clear to Auscultation] : lungs were clear to auscultation bilaterally [No Accessory Muscle Use] : no accessory muscle use [Normal Rate] : normal rate  [Regular Rhythm] : with a regular rhythm [Normal S1, S2] : normal S1 and S2 [No Murmur] : no murmur heard [No Carotid Bruits] : no carotid bruits [No Edema] : there was no peripheral edema [Soft] : abdomen soft [Non Tender] : non-tender [Non-distended] : non-distended [No Masses] : no abdominal mass palpated [No HSM] : no HSM [Normal Bowel Sounds] : normal bowel sounds [Normal Supraclavicular Nodes] : no supraclavicular lymphadenopathy [Normal Posterior Cervical Nodes] : no posterior cervical lymphadenopathy [Normal Anterior Cervical Nodes] : no anterior cervical lymphadenopathy [No CVA Tenderness] : no CVA  tenderness [No Spinal Tenderness] : no spinal tenderness [No Joint Swelling] : no joint swelling [Grossly Normal Strength/Tone] : grossly normal strength/tone [No Rash] : no rash [Speech Grossly Normal] : speech grossly normal [Memory Grossly Normal] : memory grossly normal [Normal Affect] : the affect was normal [Alert and Oriented x3] : oriented to person, place, and time [Normal Mood] : the mood was normal [Normal Insight/Judgement] : insight and judgment were intact [Normal TMs] : both tympanic membranes were normal [Pedal Pulses Present] : the pedal pulses are present [Normal Gait] : normal gait [Coordination Grossly Intact] : coordination grossly intact [No Focal Deficits] : no focal deficits [Deep Tendon Reflexes (DTR)] : deep tendon reflexes were 2+ and symmetric [de-identified] : Pain with internal and external rotation of the left hip and limited range of motion [de-identified] : There is pain with straight leg raising to 60° on the left

## 2018-11-15 NOTE — ASSESSMENT
[FreeTextEntry1] : Patient blood pressure is controlled and she is not orthostatic. She will continue on amlodipine and adequate hydration\par A repeat RNP level was sent. She certainly does not have any signs or symptoms of connective tissue disease.\par Her Parkinson's seems controlled.\par She will followup with orthopedics and physiatry for her left lower extremity pain due to  Iumbar radiculopathy L4-5.We discussed that while she is on the diclofenac she should take a omeprazole daily. We discussed that should she have GI upset or melena to call. We discussed she should take diclofenac with food\par \par

## 2018-11-15 NOTE — HISTORY OF PRESENT ILLNESS
[FreeTextEntry1] : Orthostatic hypotension\par Parkinson's\par Left lower extremity pain [de-identified] : For about the past 5 weeks patient has been having pain in her left groin that radiates down the anterior part of her leg into her ankle. When she is sitting she has no pain but if she moves at night it wakes her up. When she stands up she has the pain and she cannot put weight on the left foot. She has no back pain. She is not sure if she did something when she was doing some twisting exercises at the gym  for her Parkinson's. She's been seeing orthopedics and had an injection in her left hip which did not help the pain. She took 6 days of prednisone which did not help. She had an MRI of her lumbar spine which  revealed a left herniated L4-5 disc. She sometimes feels the leg may give way but it is not numb She had an x-ray of her hip which revealed mild arthritis. She did not find much relief with meloxicam and is now on diclofenac and feels a bit better.  She is now planning a followup with physiatry for further evaluation. She has a planned trip to [facility] in 2 weeks and is concerned she may not be able to do this.\par She is maintaining adequate hydration she has no dizziness. She has no rash or ulcers pain or swelling in her hands ankles or wrists.

## 2018-11-25 LAB
ALBUMIN SERPL ELPH-MCNC: 4.2 G/DL
ALP BLD-CCNC: 90 U/L
ALT SERPL-CCNC: 12 U/L
ANA SER IF-ACNC: NEGATIVE
ANACR T: NEGATIVE
ANION GAP SERPL CALC-SCNC: 14 MMOL/L
APPEARANCE: CLEAR
AST SERPL-CCNC: 33 U/L
BACTERIA: NEGATIVE
BASOPHILS # BLD AUTO: 0.07 K/UL
BASOPHILS NFR BLD AUTO: 0.9 %
BILIRUB SERPL-MCNC: 0.4 MG/DL
BILIRUBIN URINE: NEGATIVE
BLOOD URINE: NEGATIVE
BUN SERPL-MCNC: 22 MG/DL
CALCIUM SERPL-MCNC: 9.5 MG/DL
CHLORIDE SERPL-SCNC: 102 MMOL/L
CO2 SERPL-SCNC: 24 MMOL/L
COLOR: ABNORMAL
CREAT SERPL-MCNC: 0.78 MG/DL
CRP SERPL-MCNC: 0.66 MG/DL
ENA RNP AB SER IA-ACNC: 0.9 AL
ENA SCL70 IGG SER IA-ACNC: <0.2 AL
ENA SM AB SER IA-ACNC: <0.2 AL
ENA SS-A AB SER IA-ACNC: <0.2 AL
ENA SS-B AB SER IA-ACNC: <0.2 AL
EOSINOPHIL # BLD AUTO: 0.09 K/UL
EOSINOPHIL NFR BLD AUTO: 1.2 %
ERYTHROCYTE [SEDIMENTATION RATE] IN BLOOD BY WESTERGREN METHOD: 35 MM/HR
GLUCOSE QUALITATIVE U: NEGATIVE
GLUCOSE SERPL-MCNC: 89 MG/DL
HCT VFR BLD CALC: 40.1 %
HGB BLD-MCNC: 13.5 G/DL
HYALINE CASTS: 13 /LPF
IMM GRANULOCYTES NFR BLD AUTO: 0.4 %
KETONES URINE: ABNORMAL
LEUKOCYTE ESTERASE URINE: NEGATIVE
LYMPHOCYTES # BLD AUTO: 1.11 K/UL
LYMPHOCYTES NFR BLD AUTO: 14.5 %
MAN DIFF?: NORMAL
MCHC RBC-ENTMCNC: 31.3 PG
MCHC RBC-ENTMCNC: 33.7 GM/DL
MCV RBC AUTO: 92.8 FL
MICROSCOPIC-UA: NORMAL
MONOCYTES # BLD AUTO: 0.53 K/UL
MONOCYTES NFR BLD AUTO: 6.9 %
NEUTROPHILS # BLD AUTO: 5.83 K/UL
NEUTROPHILS NFR BLD AUTO: 76.1 %
NITRITE URINE: NEGATIVE
PH URINE: 7.5
PLATELET # BLD AUTO: 293 K/UL
POTASSIUM SERPL-SCNC: 4.1 MMOL/L
PROT SERPL-MCNC: 7.1 G/DL
PROTEIN URINE: ABNORMAL
RBC # BLD: 4.32 M/UL
RBC # FLD: 14.7 %
RED BLOOD CELLS URINE: 6 /HPF
SODIUM SERPL-SCNC: 140 MMOL/L
SPECIFIC GRAVITY URINE: 1.02
SQUAMOUS EPITHELIAL CELLS: 3 /HPF
THYROGLOB AB SERPL-ACNC: <20 IU/ML
THYROPEROXIDASE AB SERPL IA-ACNC: <10 IU/ML
UROBILINOGEN URINE: 1
WBC # FLD AUTO: 7.66 K/UL
WHITE BLOOD CELLS URINE: 5 /HPF

## 2018-11-26 ENCOUNTER — CLINICAL ADVICE (OUTPATIENT)
Age: 78
End: 2018-11-26

## 2018-11-26 RX ORDER — DICLOFENAC SODIUM 75 MG/1
75 TABLET, DELAYED RELEASE ORAL TWICE DAILY
Qty: 60 | Refills: 0 | Status: DISCONTINUED | COMMUNITY
Start: 2018-11-13 | End: 2018-11-26

## 2018-12-03 ENCOUNTER — RX RENEWAL (OUTPATIENT)
Age: 78
End: 2018-12-03

## 2018-12-10 ENCOUNTER — MEDICATION RENEWAL (OUTPATIENT)
Age: 78
End: 2018-12-10

## 2018-12-12 ENCOUNTER — APPOINTMENT (OUTPATIENT)
Dept: NEUROSURGERY | Facility: CLINIC | Age: 78
End: 2018-12-12
Payer: MEDICARE

## 2018-12-12 VITALS
DIASTOLIC BLOOD PRESSURE: 78 MMHG | SYSTOLIC BLOOD PRESSURE: 143 MMHG | HEIGHT: 59 IN | HEART RATE: 86 BPM | WEIGHT: 120 LBS | BODY MASS INDEX: 24.19 KG/M2

## 2018-12-12 PROCEDURE — 99203 OFFICE O/P NEW LOW 30 MIN: CPT

## 2018-12-12 RX ORDER — NABUMETONE 500 MG/1
500 TABLET, FILM COATED ORAL
Qty: 60 | Refills: 0 | Status: DISCONTINUED | COMMUNITY
Start: 2018-11-26 | End: 2018-12-12

## 2018-12-12 RX ORDER — MULTIVIT-MIN/FA/LYCOPEN/LUTEIN .4-300-25
TABLET ORAL
Refills: 0 | Status: ACTIVE | COMMUNITY

## 2018-12-16 ENCOUNTER — TRANSCRIPTION ENCOUNTER (OUTPATIENT)
Age: 78
End: 2018-12-16

## 2018-12-17 ENCOUNTER — OUTPATIENT (OUTPATIENT)
Dept: OUTPATIENT SERVICES | Facility: HOSPITAL | Age: 78
LOS: 1 days | End: 2018-12-17
Payer: MEDICARE

## 2018-12-17 ENCOUNTER — APPOINTMENT (OUTPATIENT)
Dept: NEUROSURGERY | Facility: CLINIC | Age: 78
End: 2018-12-17
Payer: MEDICARE

## 2018-12-17 DIAGNOSIS — M54.16 RADICULOPATHY, LUMBAR REGION: ICD-10-CM

## 2018-12-17 PROCEDURE — 62323 NJX INTERLAMINAR LMBR/SAC: CPT

## 2018-12-21 ENCOUNTER — APPOINTMENT (OUTPATIENT)
Dept: INTERNAL MEDICINE | Facility: CLINIC | Age: 78
End: 2018-12-21

## 2019-01-04 ENCOUNTER — RX RENEWAL (OUTPATIENT)
Age: 79
End: 2019-01-04

## 2019-01-09 ENCOUNTER — APPOINTMENT (OUTPATIENT)
Dept: NEUROSURGERY | Facility: CLINIC | Age: 79
End: 2019-01-09
Payer: MEDICARE

## 2019-01-09 ENCOUNTER — TRANSCRIPTION ENCOUNTER (OUTPATIENT)
Age: 79
End: 2019-01-09

## 2019-01-09 VITALS
BODY MASS INDEX: 23.39 KG/M2 | WEIGHT: 116 LBS | DIASTOLIC BLOOD PRESSURE: 72 MMHG | HEART RATE: 86 BPM | HEIGHT: 59 IN | SYSTOLIC BLOOD PRESSURE: 132 MMHG

## 2019-01-09 PROCEDURE — 99213 OFFICE O/P EST LOW 20 MIN: CPT

## 2019-01-09 NOTE — PHYSICAL EXAM
[General Appearance - Alert] : alert [Mood] : the mood was normal [FreeTextEntry1] : no redness, swelling, or tenderness at the injection site

## 2019-01-09 NOTE — ASSESSMENT
[FreeTextEntry1] : 78 year old female with low back and lumbar radicular pain not improved following her first injection. She is agreeable to a repeat procedure and will follow up with me tomorrow for her procedure.

## 2019-01-09 NOTE — REASON FOR VISIT
[Follow-Up: _____] : a [unfilled] follow-up visit [FreeTextEntry1] : Patient returns after her first LESI a few weeks ago.  Unfortunately, she does not feel any improvement from this procedure.  She is here to discuss her progress.

## 2019-01-10 ENCOUNTER — OUTPATIENT (OUTPATIENT)
Dept: OUTPATIENT SERVICES | Facility: HOSPITAL | Age: 79
LOS: 1 days | End: 2019-01-10
Payer: MEDICARE

## 2019-01-10 ENCOUNTER — APPOINTMENT (OUTPATIENT)
Dept: NEUROSURGERY | Facility: CLINIC | Age: 79
End: 2019-01-10
Payer: MEDICARE

## 2019-01-10 DIAGNOSIS — M54.16 RADICULOPATHY, LUMBAR REGION: ICD-10-CM

## 2019-01-10 PROCEDURE — 62323 NJX INTERLAMINAR LMBR/SAC: CPT

## 2019-01-25 ENCOUNTER — APPOINTMENT (OUTPATIENT)
Dept: NEUROSURGERY | Facility: CLINIC | Age: 79
End: 2019-01-25
Payer: MEDICARE

## 2019-01-25 VITALS
SYSTOLIC BLOOD PRESSURE: 145 MMHG | HEIGHT: 59 IN | HEART RATE: 83 BPM | BODY MASS INDEX: 23.39 KG/M2 | DIASTOLIC BLOOD PRESSURE: 73 MMHG | WEIGHT: 116 LBS

## 2019-01-25 PROCEDURE — 99213 OFFICE O/P EST LOW 20 MIN: CPT

## 2019-01-25 NOTE — ASSESSMENT
[FreeTextEntry1] : 78 year old female with low back and lumbar radicular pain not improved following a series of 2 LESIs.  We did discuss her treatment options including PT, medications, acupuncture, surgery.  She will consider surgical intervention and she is being referred for this.  In the interim for her pain, we will begin a trial of Gabapentin.  Side effects discussed.

## 2019-01-25 NOTE — REASON FOR VISIT
[Follow-Up: _____] : a [unfilled] follow-up visit [FreeTextEntry1] : Patient returns after her second LESI a couple of weeks ago.  Unfortunately, she did not experience any relief from this procedure.  She has not experienced any relief from her series of 2 epidurals.  She is here to discuss her options.

## 2019-01-29 ENCOUNTER — APPOINTMENT (OUTPATIENT)
Dept: ORTHOPEDIC SURGERY | Facility: CLINIC | Age: 79
End: 2019-01-29
Payer: MEDICARE

## 2019-01-29 VITALS — BODY MASS INDEX: 23.39 KG/M2 | WEIGHT: 116 LBS | HEIGHT: 59 IN

## 2019-01-29 PROCEDURE — 72100 X-RAY EXAM L-S SPINE 2/3 VWS: CPT

## 2019-01-29 PROCEDURE — 99205 OFFICE O/P NEW HI 60 MIN: CPT

## 2019-01-29 PROCEDURE — 99215 OFFICE O/P EST HI 40 MIN: CPT

## 2019-01-29 NOTE — REASON FOR VISIT
[Initial Visit] : an initial visit for [Back Pain] : back pain [FreeTextEntry2] : Recommended for evaluation by Dr Navarro

## 2019-01-29 NOTE — HISTORY OF PRESENT ILLNESS
[Bending] : worsened by bending [Prolonged Sitting] : worsened by prolonged sitting [Walking] : worsened by walking [Rest] : relieved by rest [7] : an average pain level of 7/10 [6] : a minimum pain level of 6/10 [8] : a maximum pain level of 8/10 [de-identified] : Pt presents with c/o constant low back pain for the past 3.5 months, Pain radiates to left buttocks, left groin and anterior aspect of LLE to ankle, Numbness on sole of left foot and LLE weakness. She denies tingling to B/L LE. Pt takes Tylenol ES and Gabapentin 100mg TID, this provides mild relief in pain. Pt denies injury/falls. She was treated with Prednisone in the past, this provided no relief in symptoms. Pt saw Dr Rinaldi and  Dr. 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 Pt uses walker for the past 3 weeks due to weakness, she was using a cane prior to this. Pt participates with PT for Parkinson ( boxing for Parkinson). [Acupuncture] : not relieved by acupuncture [Recumbency] : not relieved by recumbency [Incontinence] : no incontinence [Urinary Ret.] : no urinary retention [de-identified] : rising from chair exacerbates pain

## 2019-01-29 NOTE — CONSULT LETTER
[Dear  ___] : Dear  [unfilled], [Consult Letter:] : I had the pleasure of evaluating your patient, [unfilled]. [Please see my note below.] : Please see my note below. [Consult Closing:] : Thank you very much for allowing me to participate in the care of this patient.  If you have any questions, please do not hesitate to contact me. [Sincerely,] : Sincerely, [FreeTextEntry3] : Tee Patricia MD

## 2019-01-29 NOTE — DISCUSSION/SUMMARY
[de-identified] : 77 yo female with spinal stenosis, herniated disc and spondylolisthesis, failed PT, NSAIDS, and ELE, impacting ADL's and quality of life, recommend Laminectomy and spinal fusion with instrumentation with PLIF L45 due to her parkinsons.\par \par I reviewed all major risks, benefits, and complications associated with surgical intervention including but not limited to bleeding, infection, neurological injury, CSF leak, implant failure, implant migration, pedicle screw breech, pseudarthrosis, adjacent segment degeneration, hematoma, anesthetic risk, chronic pain and disability , medical complications (GI, , DVT, PE, cardiac, pulmonary, etc) and risk of mortality.

## 2019-01-29 NOTE — PHYSICAL EXAM
[Antalgic] : antalgic [Wheelchair] : uses a wheelchair [SLR] : positive straight leg raise [] : Motor: [UE Motor Strength NL] : Motor strength of the bilateral upper extremities is normal [NL] : Motor strength of the right lower extremity was normal [Motor Strength Lower Extremities] : left (weak) [UE/LE] : Sensory: Intact in bilateral upper & lower extremities [2+] : left brachioradialis 2+ [1+] : right patella 1+ [3+] : left patella 3+ [ALL] : dorsalis pedis, posterior tibial, femoral, popliteal, and radial 2+ and symmetric bilaterally [Normal] : Oriented to person, place, and time, insight and judgement were intact and the affect was normal [de-identified] : NTTP L spine and b/l paraspinals lumbar region\par Skin intact L spine. No rashes, ulcers, blisters. \par No lymphedema. \par Rapid alternating movements- intact\par Finger to nose testing- intact\par Full and non-painful ROM RUE, LUE, RLE and LLE. Skin intact RUE, LUE, RLE and LLE. No rashes, blisters, ulcers. NTTP RUE, LUE, RLE and LLE. No evidence of dislocation or subluxation B/L upper and lower extremities.\par LLE external rotation: produces pain\par  [de-identified] : EXAM: MR SPINE LUMBAR \par \par \par PROCEDURE DATE: 11/11/2018 \par \par \par \par INTERPRETATION: MRI OF THE LUMBAR SPINE \par \par CLINICAL INDICATION: Left buttocks and lower extremity pain for 5 weeks. \par Evaluate for herniation. \par \par TECHNIQUE: Magnetic resonance imaging of the lumbar spine was performed \par utilizing sagittal T1, T2, and inversion recovery sequences as well as axial \par T2 coronal T1-weighted sequences. \par \par COMPARISON: MR lumbar spine 7/12/2015. \par \par FINDINGS: \par \par The conus medullaris terminates at the L1-L2 level and is unremarkable in \par appearance. Vertebral body heights are preserved. There is grade 1 \par anterolisthesis of L4 on L5, new from the prior study. There is no marrow \par infiltration. Multilevel disc degeneration is noted throughout the lumbar \par spine, severe disc height loss at L4-L5, increased from the prior study. \par There are degenerative endplate changes at L2-L3 through L4-L5, increased at \par the L4-L5 level. \par \par T11-T12: Evaluated only in the sagittal plane. No central canal narrowing. \par \par T12-L1: Evaluate only the sagittal plane. No central or neural foraminal \par narrowing. \par \par L1-L2: Mild lateral facet and ligamentous productive change. No central \par canal or neural foraminal stenosis. Unchanged \par \par L2-L3: Small diffuse disc bulge with ligamentum flavum thickening. Mild \par central canal stenosis and mild bilateral foraminal stenosis, unchanged. \par \par L3-L4: Moderate bilateral facet arthropathy with small left facet joint \par effusion. Mild to moderate ligamentous productive change. Small diffuse disc \par bulge with ligamentum flavum thickening. Constellation of findings results \par in mild central canal stenosis, slightly increased from the prior study. \par Mild bilateral foraminal stenosis, unchanged. \par \par L4-L5: Grade 1 anterolisthesis. Diffuse disc bulge with annular fissure and \par superimposed central/paracentral disc protrusion. The right \par paracentral/lateral recess disc protrusion has improved. This results in \par moderate bilateral lateral recess stenosis and mild central stenosis. There \par is posterior displacement and compression of the bilateral descending nerve \par roots, which is new on the left as compared to the prior study. There is \par mild to moderate bilateral foraminal stenosis. Mild bilateral facet \par degeneration is noted. \par \par L5-S1: Small diffuse disc bulge. Mild to moderate bilateral facet productive \par changes small bilateral facet effusions. No central canal stenosis. Mild \par bilateral foraminal stenosis. Lateral recesses are relatively preserved. \par \par IMPRESSION: \par \par Multilevel degenerative disc disease, most pronounced at L4-L5, increased \par from the prior study. \par At L4-L5, new anterolisthesis and uncovering/disc bulge with superimposed \par disc protrusion results in compression of the bilateral descending nerve \par roots, new on the left as compared to 2015. \par \par \par \par \par \par \par SADA RODARTE M.D., RADIOLOGY RESIDENT \par This document has been electronically signed. \par STARLA VILLALBA M.D., ATTENDING RADIOLOGIST \par This document has been electronically signed. Nov 13 2018 3:10PM \par \par

## 2019-02-08 ENCOUNTER — CLINICAL ADVICE (OUTPATIENT)
Age: 79
End: 2019-02-08

## 2019-02-15 ENCOUNTER — APPOINTMENT (OUTPATIENT)
Dept: INTERNAL MEDICINE | Facility: CLINIC | Age: 79
End: 2019-02-15
Payer: MEDICARE

## 2019-02-15 VITALS
HEART RATE: 82 BPM | SYSTOLIC BLOOD PRESSURE: 140 MMHG | HEIGHT: 59 IN | BODY MASS INDEX: 23.79 KG/M2 | OXYGEN SATURATION: 98 % | DIASTOLIC BLOOD PRESSURE: 70 MMHG | TEMPERATURE: 98 F | WEIGHT: 118 LBS

## 2019-02-15 DIAGNOSIS — R29.898 OTHER SYMPTOMS AND SIGNS INVOLVING THE MUSCULOSKELETAL SYSTEM: ICD-10-CM

## 2019-02-15 PROCEDURE — 99215 OFFICE O/P EST HI 40 MIN: CPT

## 2019-02-15 RX ORDER — DICLOFENAC SODIUM 75 MG/1
75 TABLET, DELAYED RELEASE ORAL
Qty: 60 | Refills: 1 | Status: DISCONTINUED | COMMUNITY
End: 2019-02-15

## 2019-02-20 ENCOUNTER — FORM ENCOUNTER (OUTPATIENT)
Age: 79
End: 2019-02-20

## 2019-02-21 ENCOUNTER — OUTPATIENT (OUTPATIENT)
Dept: OUTPATIENT SERVICES | Facility: HOSPITAL | Age: 79
LOS: 1 days | End: 2019-02-21
Payer: MEDICARE

## 2019-02-21 ENCOUNTER — APPOINTMENT (OUTPATIENT)
Dept: ULTRASOUND IMAGING | Facility: CLINIC | Age: 79
End: 2019-02-21
Payer: MEDICARE

## 2019-02-21 ENCOUNTER — APPOINTMENT (OUTPATIENT)
Dept: ORTHOPEDIC SURGERY | Facility: CLINIC | Age: 79
End: 2019-02-21
Payer: MEDICARE

## 2019-02-21 DIAGNOSIS — Z91.89 OTHER SPECIFIED PERSONAL RISK FACTORS, NOT ELSEWHERE CLASSIFIED: ICD-10-CM

## 2019-02-21 DIAGNOSIS — R60.0 LOCALIZED EDEMA: ICD-10-CM

## 2019-02-21 PROCEDURE — 93970 EXTREMITY STUDY: CPT

## 2019-02-21 PROCEDURE — 99215 OFFICE O/P EST HI 40 MIN: CPT

## 2019-02-21 PROCEDURE — 93970 EXTREMITY STUDY: CPT | Mod: 26

## 2019-02-22 ENCOUNTER — OUTPATIENT (OUTPATIENT)
Dept: OUTPATIENT SERVICES | Facility: HOSPITAL | Age: 79
LOS: 1 days | End: 2019-02-22
Payer: MEDICARE

## 2019-02-22 VITALS
RESPIRATION RATE: 20 BRPM | DIASTOLIC BLOOD PRESSURE: 79 MMHG | WEIGHT: 115.08 LBS | OXYGEN SATURATION: 97 % | HEART RATE: 76 BPM | SYSTOLIC BLOOD PRESSURE: 152 MMHG | HEIGHT: 59 IN | TEMPERATURE: 98 F

## 2019-02-22 DIAGNOSIS — G20 PARKINSON'S DISEASE: ICD-10-CM

## 2019-02-22 DIAGNOSIS — E89.0 POSTPROCEDURAL HYPOTHYROIDISM: Chronic | ICD-10-CM

## 2019-02-22 DIAGNOSIS — M43.10 SPONDYLOLISTHESIS, SITE UNSPECIFIED: ICD-10-CM

## 2019-02-22 DIAGNOSIS — Z90.89 ACQUIRED ABSENCE OF OTHER ORGANS: Chronic | ICD-10-CM

## 2019-02-22 DIAGNOSIS — Z29.9 ENCOUNTER FOR PROPHYLACTIC MEASURES, UNSPECIFIED: ICD-10-CM

## 2019-02-22 DIAGNOSIS — Z98.890 OTHER SPECIFIED POSTPROCEDURAL STATES: Chronic | ICD-10-CM

## 2019-02-22 DIAGNOSIS — Z01.818 ENCOUNTER FOR OTHER PREPROCEDURAL EXAMINATION: ICD-10-CM

## 2019-02-22 DIAGNOSIS — M43.16 SPONDYLOLISTHESIS, LUMBAR REGION: ICD-10-CM

## 2019-02-22 DIAGNOSIS — I10 ESSENTIAL (PRIMARY) HYPERTENSION: ICD-10-CM

## 2019-02-22 LAB
ANION GAP SERPL CALC-SCNC: 16 MMOL/L — SIGNIFICANT CHANGE UP (ref 5–17)
BLD GP AB SCN SERPL QL: NEGATIVE — SIGNIFICANT CHANGE UP
BUN SERPL-MCNC: 25 MG/DL — HIGH (ref 7–23)
CALCIUM SERPL-MCNC: 10.4 MG/DL — SIGNIFICANT CHANGE UP (ref 8.4–10.5)
CHLORIDE SERPL-SCNC: 97 MMOL/L — SIGNIFICANT CHANGE UP (ref 96–108)
CO2 SERPL-SCNC: 27 MMOL/L — SIGNIFICANT CHANGE UP (ref 22–31)
CREAT SERPL-MCNC: 0.77 MG/DL — SIGNIFICANT CHANGE UP (ref 0.5–1.3)
GLUCOSE SERPL-MCNC: 118 MG/DL — HIGH (ref 70–99)
HCT VFR BLD CALC: 41.8 % — SIGNIFICANT CHANGE UP (ref 34.5–45)
HGB BLD-MCNC: 13.1 G/DL — SIGNIFICANT CHANGE UP (ref 11.5–15.5)
MCHC RBC-ENTMCNC: 30.5 PG — SIGNIFICANT CHANGE UP (ref 27–34)
MCHC RBC-ENTMCNC: 31.3 GM/DL — LOW (ref 32–36)
MCV RBC AUTO: 97.2 FL — SIGNIFICANT CHANGE UP (ref 80–100)
PLATELET # BLD AUTO: 283 K/UL — SIGNIFICANT CHANGE UP (ref 150–400)
POTASSIUM SERPL-MCNC: 3.5 MMOL/L — SIGNIFICANT CHANGE UP (ref 3.5–5.3)
POTASSIUM SERPL-SCNC: 3.5 MMOL/L — SIGNIFICANT CHANGE UP (ref 3.5–5.3)
RBC # BLD: 4.3 M/UL — SIGNIFICANT CHANGE UP (ref 3.8–5.2)
RBC # FLD: 13.7 % — SIGNIFICANT CHANGE UP (ref 10.3–14.5)
RH IG SCN BLD-IMP: POSITIVE — SIGNIFICANT CHANGE UP
SODIUM SERPL-SCNC: 140 MMOL/L — SIGNIFICANT CHANGE UP (ref 135–145)
WBC # BLD: 9.49 K/UL — SIGNIFICANT CHANGE UP (ref 3.8–10.5)
WBC # FLD AUTO: 9.49 K/UL — SIGNIFICANT CHANGE UP (ref 3.8–10.5)

## 2019-02-22 PROCEDURE — 86901 BLOOD TYPING SEROLOGIC RH(D): CPT

## 2019-02-22 PROCEDURE — 87640 STAPH A DNA AMP PROBE: CPT

## 2019-02-22 PROCEDURE — 87641 MR-STAPH DNA AMP PROBE: CPT

## 2019-02-22 PROCEDURE — G0463: CPT

## 2019-02-22 PROCEDURE — 86900 BLOOD TYPING SEROLOGIC ABO: CPT

## 2019-02-22 PROCEDURE — 80048 BASIC METABOLIC PNL TOTAL CA: CPT

## 2019-02-22 PROCEDURE — 85027 COMPLETE CBC AUTOMATED: CPT

## 2019-02-22 PROCEDURE — 86850 RBC ANTIBODY SCREEN: CPT

## 2019-02-22 RX ORDER — CEFAZOLIN SODIUM 1 G
2000 VIAL (EA) INJECTION ONCE
Qty: 0 | Refills: 0 | Status: DISCONTINUED | OUTPATIENT
Start: 2019-03-01 | End: 2019-03-04

## 2019-02-22 NOTE — H&P PST ADULT - HISTORY OF PRESENT ILLNESS
79 yo female with PMH of HTN, HLD, Hypothyroidism parkinson's.  Pt has been experiencing low back pain , radiating to the left hip, groin pain, impacting ADL's and quality of life,  followed by pain/neurology dx spinal stenosis, herniated disc and spondylolisthesis, failed PT, NSAIDS, and ELE, impacting ADL's and quality of life. Presents to PST for scheduled  Posterior Lumbar Laminectomy  & Instrumented  Spinal Fusion with Interbody on 3/1/2019.

## 2019-02-22 NOTE — H&P PST ADULT - ASSESSMENT
CAPRINI SCORE [CLOT updated 18]    AGE RELATED RISK FACTORS                                                       MOBILITY RELATED FACTORS  [ ] Age 41-60 years                                            (1 Point)                    [ ] Bed rest                                                        (1 Point)  [ ] Age: 61-74 years                                           (2 Points)                  [ ] Plaster cast                                                   (2 Points)  [x ] Age= 75 years                                              (3 Points)                    [ ] Bed bound for more than 72 hours                 (2 Points)    DISEASE RELATED RISK FACTORS                                               GENDER SPECIFIC FACTORS  [x ] Edema in the lower extremities                       (1 Point)              [ ] Pregnancy                                                     (1 Point)  [ ] Varicose veins                                               (1 Point)                     [ ] Post-partum < 6 weeks                                   (1 Point)             [ ] BMI > 25 Kg/m2                                            (1 Point)                     [ ] Hormonal therapy  or oral contraception          (1 Point)                 [ ] Sepsis (in the previous month)                        (1 Point)               [ ] History of pregnancy complications                 (1 point)  [ ] Pneumonia or serious lung disease                                               [ ] Unexplained or recurrent                     (1 Point)           (in the previous month)                               (1 Point)  [ ] Abnormal pulmonary function test                     (1 Point)                 SURGERY RELATED RISK FACTORS  [ ] Acute myocardial infarction                              (1 Point)               [ ]  Section                                             (1 Point)  [ ] Congestive heart failure (in the previous month)  (1 Point)      [ ] Minor surgery                                                  (1 Point)   [ ] Inflammatory bowel disease                             (1 Point)               [ ] Arthroscopic surgery                                        (2 Points)  [ ] Central venous access                                      (2 Points)                [x ] General surgery lasting more than 45 minutes (2 points)  [ ] Present or previous malignancy                     (2 Points)                [ ] Elective arthroplasty                                         (5 points)    [ ] Stroke (in the previous month)                          (5 Points)                                                                                                                                                           HEMATOLOGY RELATED FACTORS                                                 TRAUMA RELATED RISK FACTORS  [ ] Prior episodes of VTE                                     (3 Points)                [ ] Fracture of the hip, pelvis, or leg                       (5 Points)  [ ] Positive family history for VTE                         (3 Points)             [ ] Acute spinal cord injury (in the previous month)  (5 Points)  [ ] Prothrombin 15068 A                                     (3 Points)               [ ] Paralysis  (less than 1 month)                             (5 Points)  [ ] Factor V Leiden                                             (3 Points)                  [ ] Multiple Trauma within 1 month                        (5 Points)  [ ] Lupus anticoagulants                                     (3 Points)                                                           [ ] Anticardiolipin antibodies                               (3 Points)                                                       [ ] High homocysteine in the blood                      (3 Points)                                             [ ] Other congenital or acquired thrombophilia      (3 Points)                                                [ ] Heparin induced thrombocytopenia                  (3 Points)                                     Total Score [     6     ]

## 2019-02-22 NOTE — H&P PST ADULT - PROBLEM SELECTOR PLAN 1
Posterior Lumbar Laminectomy  & Instrumented  Spinal Fusion with Interbody on 3/1/2019.  Pre- op instructions discussed   CBc,BMP,Type and screen, MRSA/MSSa sent  medical eval Posterior Lumbar Laminectomy  & Instrumented  Spinal Fusion with Interbody on 3/1/2019.  Pre- op instructions discussed   CBc,BMP,Type and screen, MRSA/MSSa sent  medical eval ,ekg

## 2019-02-22 NOTE — H&P PST ADULT - RS GEN PE MLT RESP DETAILS PC
good air movement/normal/breath sounds equal/airway patent/respirations non-labored/clear to auscultation bilaterally

## 2019-02-22 NOTE — H&P PST ADULT - PMH
MARK positive    Edema of lower extremity  seen by PMD doppler done -as per patient normal  Essential hypertension    HLD (hyperlipidemia)    Hypothyroidism    Osteoporosis    Parkinson's disease  dx 2012  Spondylolisthesis of lumbar region MARK positive    Edema of lower extremity  seen by PMD doppler done -as per patient normal  Essential hypertension    HLD (hyperlipidemia)    Hypothyroidism    MVP (mitral valve prolapse)  -no cardiology  Osteoporosis    Parkinson's disease  dx 2012  Spondylolisthesis of lumbar region

## 2019-02-22 NOTE — H&P PST ADULT - PSH
H/O colonoscopy  2018  History of shoulder surgery  Right- long time ago  History of thyroidectomy, total  1992  History of tonsillectomy  childhood

## 2019-02-22 NOTE — H&P PST ADULT - ATTENDING COMMENTS
79 yo female with spinal stenosis, herniated disc and spondylolisthesis, failed PT, NSAIDS, and ELE, impacting ADL's and quality of life, recommend Laminectomy and spinal fusion with instrumentation with PLIF L45 due to her parkinsons. RLE radiculopathy with numbness, weakness and tingling in L5 distribution.    I reviewed all major risks, benefits, and complications associated with surgical intervention including but not limited to bleeding, infection, neurological injury, CSF leak, implant failure, implant migration, pedicle screw breech, pseudarthrosis, adjacent segment degeneration, hematoma, anesthetic risk, chronic pain and disability , medical complications (GI, , DVT, PE, cardiac, pulmonary, etc) and risk of mortality.

## 2019-02-22 NOTE — H&P PST ADULT - FALL HARM RISK CONCLUSION
Pt presents to ER with father c/o suicidal thoughts. Pt reports feeling depressed/suicidal for the past few weeks. Pt has been seeing a therapist for the past year and recently started new meds. Denies having a plan
Fall with Harm Risk

## 2019-02-22 NOTE — H&P PST ADULT - NSANTHOSAYNRD_GEN_A_CORE
No. HYACINTH screening performed.  STOP BANG Legend: 0-2 = LOW Risk; 3-4 = INTERMEDIATE Risk; 5-8 = HIGH Risk

## 2019-02-23 PROBLEM — G20 PARKINSON'S DISEASE: Chronic | Status: ACTIVE | Noted: 2019-02-22

## 2019-02-23 LAB
MRSA PCR RESULT.: SIGNIFICANT CHANGE UP
S AUREUS DNA NOSE QL NAA+PROBE: SIGNIFICANT CHANGE UP

## 2019-02-26 RX ORDER — OMEPRAZOLE 20 MG/1
20 CAPSULE, DELAYED RELEASE ORAL
Qty: 30 | Refills: 0 | Status: DISCONTINUED | COMMUNITY
Start: 2018-11-15 | End: 2019-02-26

## 2019-02-26 NOTE — PHYSICAL EXAM
[No Acute Distress] : no acute distress [Well Nourished] : well nourished [Well Developed] : well developed [Well-Appearing] : well-appearing [Normal Voice/Communication] : normal voice/communication [Normal Sclera/Conjunctiva] : normal sclera/conjunctiva [PERRL] : pupils equal round and reactive to light [EOMI] : extraocular movements intact [Normal Outer Ear/Nose] : the outer ears and nose were normal in appearance [Normal Oropharynx] : the oropharynx was normal [Normal TMs] : both tympanic membranes were normal [No JVD] : no jugular venous distention [Supple] : supple [No Lymphadenopathy] : no lymphadenopathy [Thyroid Normal, No Nodules] : the thyroid was normal and there were no nodules present [No Respiratory Distress] : no respiratory distress  [Clear to Auscultation] : lungs were clear to auscultation bilaterally [No Accessory Muscle Use] : no accessory muscle use [Normal Rate] : normal rate  [Regular Rhythm] : with a regular rhythm [Normal S1, S2] : normal S1 and S2 [No Murmur] : no murmur heard [No Carotid Bruits] : no carotid bruits [Pedal Pulses Present] : the pedal pulses are present [Soft] : abdomen soft [Non Tender] : non-tender [Non-distended] : non-distended [No HSM] : no HSM [Normal Bowel Sounds] : normal bowel sounds [Normal Supraclavicular Nodes] : no supraclavicular lymphadenopathy [Normal Posterior Cervical Nodes] : no posterior cervical lymphadenopathy [Normal Anterior Cervical Nodes] : no anterior cervical lymphadenopathy [No CVA Tenderness] : no CVA  tenderness [No Spinal Tenderness] : no spinal tenderness [No Joint Swelling] : no joint swelling [Grossly Normal Strength/Tone] : grossly normal strength/tone [No Rash] : no rash [Normal Gait] : normal gait [Coordination Grossly Intact] : coordination grossly intact [No Focal Deficits] : no focal deficits [Deep Tendon Reflexes (DTR)] : deep tendon reflexes were 2+ and symmetric [Speech Grossly Normal] : speech grossly normal [Memory Grossly Normal] : memory grossly normal [Normal Affect] : the affect was normal [Alert and Oriented x3] : oriented to person, place, and time [Normal Mood] : the mood was normal [Normal Insight/Judgement] : insight and judgment were intact [Normal Percussion] : the chest was normal to percussion [No Abdominal Bruit] : a ~M bruit was not heard ~T in the abdomen [No Varicosities] : no varicosities [No Extremity Clubbing/Cyanosis] : no extremity clubbing/cyanosis [No Palpable Aorta] : no palpable aorta [Normal Appearance] : normal in appearance [No Masses] : no palpable masses [No Nipple Discharge] : no nipple discharge [No Axillary Lymphadenopathy] : no axillary lymphadenopathy [de-identified] : 1+ bilateral pretibial edema.  No calf tenderness Mary Kay or cords [de-identified] : There is pain with straight leg raising to 30° on the left

## 2019-02-26 NOTE — HISTORY OF PRESENT ILLNESS
[No Pertinent Pulmonary History] : no history of asthma, COPD, sleep apnea, or smoking [No Adverse Anesthesia Reaction] : no adverse anesthesia reaction in self or family member [(Patient denies any chest pain, claudication, dyspnea on exertion, orthopnea, palpitations or syncope)] : Patient denies any chest pain, claudication, dyspnea on exertion, orthopnea, palpitations or syncope [Moderate (4-6 METs)] : Moderate (4-6 METs) [No Pertinent Cardiac History] : no history of aortic stenosis, atrial fibrillation, coronary artery disease, recent myocardial infarction, or implantable device/pacemaker [Aortic Stenosis] : no aortic stenosis [Atrial Fibrillation] : no atrial fibrillation [Coronary Artery Disease] : no coronary artery disease [Recent Myocardial Infarction] : no recent myocardial infarction [Implantable Device/Pacemaker] : no implantable device/pacemaker [Chronic Anticoagulation] : no chronic anticoagulation [Chronic Kidney Disease] : no chronic kidney disease [Diabetes] : no diabetes [FreeTextEntry1] : laminectomy and spinal fusion with instrumentation PLL L4/5 [FreeTextEntry2] : March 1, 2019 [FreeTextEntry3] : Dr Tee Patricia [FreeTextEntry4] : She is planning laminectomy and  spinal fusion by Dr. Patricia March 1, 2019. She states her left leg doesn't work anymore. She is in constant pain. She is walking with a walker. She is taking 3 Tylenol #3 a day and also gabapentin 100 mg t.i.d. She can't sleep at night. She is spending most of the night sitting in a chair as that is her best position. She fell once just losing her balance. She has no numbness of the leg but she feels it is weak. She's never had problems with prior surgery or  anesthesia.   She has no chest pain or dyspnea. She is aware that her feet and ankles haven't been a bit more swollen lately. She has had no dizziness or lightheadedness

## 2019-02-26 NOTE — ASSESSMENT
[Patient NOT optimized for Surgery at this time] : Patient not optimized for surgery at this time [As per surgery] : as per surgery [FreeTextEntry4] : I suspect the patient's bilateral pedal edema may be due to the amlodipine plus also inactivity and gravity. I will however have her go for venous Dopplers to make  there is no DVT. If this is negative and her presurgical testing are normal there is no contraindication to planned surgery and anesthesia.An addendum will be added to this note. \par I have requested she speak with neuro about holding her Azilect prior to surgery due to the possible interactions with anesthesia as this is  a MAO inhibitor.  she will stop the Azilect 10 days prior to surgery.  \par The morning of surgery she will take her carbidopa levodopa , amlodipine and levothyroxine with sips of water.\par she will increase her gabapentin to 200mg three times a day to see if this will help her pain

## 2019-02-26 NOTE — ADDENDUM
[FreeTextEntry1] : 2/26/2019..Her venous dopplers were negative for DVT.  Her PST blood work was reviewed.  She is in optimal medical condition and medically cleared for surgery and anesthesia. Please resume levothyroxine, atorvastatin, levodopa-carbidopa, amlodipine,  and lactulose post operatively.  DVT precautions as per surgery.

## 2019-02-28 ENCOUNTER — TRANSCRIPTION ENCOUNTER (OUTPATIENT)
Age: 79
End: 2019-02-28

## 2019-03-01 ENCOUNTER — INPATIENT (INPATIENT)
Facility: HOSPITAL | Age: 79
LOS: 2 days | Discharge: NURSING, REHAB OR LONG-TERM CARE | DRG: 460 | End: 2019-03-04
Attending: ORTHOPAEDIC SURGERY | Admitting: ORTHOPAEDIC SURGERY
Payer: MEDICARE

## 2019-03-01 ENCOUNTER — APPOINTMENT (OUTPATIENT)
Dept: ORTHOPEDIC SURGERY | Facility: HOSPITAL | Age: 79
End: 2019-03-01

## 2019-03-01 VITALS
TEMPERATURE: 98 F | RESPIRATION RATE: 18 BRPM | SYSTOLIC BLOOD PRESSURE: 138 MMHG | WEIGHT: 115.08 LBS | DIASTOLIC BLOOD PRESSURE: 78 MMHG | HEIGHT: 59 IN | OXYGEN SATURATION: 100 % | HEART RATE: 73 BPM

## 2019-03-01 DIAGNOSIS — Z90.89 ACQUIRED ABSENCE OF OTHER ORGANS: Chronic | ICD-10-CM

## 2019-03-01 DIAGNOSIS — E89.0 POSTPROCEDURAL HYPOTHYROIDISM: Chronic | ICD-10-CM

## 2019-03-01 DIAGNOSIS — M43.10 SPONDYLOLISTHESIS, SITE UNSPECIFIED: ICD-10-CM

## 2019-03-01 DIAGNOSIS — Z98.890 OTHER SPECIFIED POSTPROCEDURAL STATES: Chronic | ICD-10-CM

## 2019-03-01 LAB
ANION GAP SERPL CALC-SCNC: 15 MMOL/L — SIGNIFICANT CHANGE UP (ref 5–17)
BASOPHILS # BLD AUTO: 0 K/UL — SIGNIFICANT CHANGE UP (ref 0–0.2)
BASOPHILS NFR BLD AUTO: 0.1 % — SIGNIFICANT CHANGE UP (ref 0–2)
BUN SERPL-MCNC: 15 MG/DL — SIGNIFICANT CHANGE UP (ref 7–23)
CALCIUM SERPL-MCNC: 8.8 MG/DL — SIGNIFICANT CHANGE UP (ref 8.4–10.5)
CHLORIDE SERPL-SCNC: 104 MMOL/L — SIGNIFICANT CHANGE UP (ref 96–108)
CO2 SERPL-SCNC: 26 MMOL/L — SIGNIFICANT CHANGE UP (ref 22–31)
CREAT SERPL-MCNC: 0.61 MG/DL — SIGNIFICANT CHANGE UP (ref 0.5–1.3)
EOSINOPHIL # BLD AUTO: 0 K/UL — SIGNIFICANT CHANGE UP (ref 0–0.5)
EOSINOPHIL NFR BLD AUTO: 0.3 % — SIGNIFICANT CHANGE UP (ref 0–6)
GLUCOSE SERPL-MCNC: 120 MG/DL — HIGH (ref 70–99)
HCT VFR BLD CALC: 40.8 % — SIGNIFICANT CHANGE UP (ref 34.5–45)
HGB BLD-MCNC: 13.6 G/DL — SIGNIFICANT CHANGE UP (ref 11.5–15.5)
LYMPHOCYTES # BLD AUTO: 1.4 K/UL — SIGNIFICANT CHANGE UP (ref 1–3.3)
LYMPHOCYTES # BLD AUTO: 8.9 % — LOW (ref 13–44)
MCHC RBC-ENTMCNC: 31.4 PG — SIGNIFICANT CHANGE UP (ref 27–34)
MCHC RBC-ENTMCNC: 33.4 GM/DL — SIGNIFICANT CHANGE UP (ref 32–36)
MCV RBC AUTO: 93.8 FL — SIGNIFICANT CHANGE UP (ref 80–100)
MONOCYTES # BLD AUTO: 0.3 K/UL — SIGNIFICANT CHANGE UP (ref 0–0.9)
MONOCYTES NFR BLD AUTO: 2.3 % — SIGNIFICANT CHANGE UP (ref 2–14)
NEUTROPHILS # BLD AUTO: 13.4 K/UL — HIGH (ref 1.8–7.4)
NEUTROPHILS NFR BLD AUTO: 88.4 % — HIGH (ref 43–77)
PLATELET # BLD AUTO: 253 K/UL — SIGNIFICANT CHANGE UP (ref 150–400)
POTASSIUM SERPL-MCNC: 3.9 MMOL/L — SIGNIFICANT CHANGE UP (ref 3.5–5.3)
POTASSIUM SERPL-SCNC: 3.9 MMOL/L — SIGNIFICANT CHANGE UP (ref 3.5–5.3)
RBC # BLD: 4.35 M/UL — SIGNIFICANT CHANGE UP (ref 3.8–5.2)
RBC # FLD: 12.6 % — SIGNIFICANT CHANGE UP (ref 10.3–14.5)
RH IG SCN BLD-IMP: POSITIVE — SIGNIFICANT CHANGE UP
SODIUM SERPL-SCNC: 145 MMOL/L — SIGNIFICANT CHANGE UP (ref 135–145)
WBC # BLD: 15.1 K/UL — HIGH (ref 3.8–10.5)
WBC # FLD AUTO: 15.1 K/UL — HIGH (ref 3.8–10.5)

## 2019-03-01 PROCEDURE — 22612 ARTHRD PST TQ 1NTRSPC LUMBAR: CPT

## 2019-03-01 PROCEDURE — 63047 LAM FACETEC & FORAMOT LUMBAR: CPT

## 2019-03-01 PROCEDURE — 63048 LAM FACETEC &FORAMOT EA ADDL: CPT

## 2019-03-01 RX ORDER — HYDROMORPHONE HYDROCHLORIDE 2 MG/ML
0.25 INJECTION INTRAMUSCULAR; INTRAVENOUS; SUBCUTANEOUS
Qty: 0 | Refills: 0 | Status: DISCONTINUED | OUTPATIENT
Start: 2019-03-01 | End: 2019-03-01

## 2019-03-01 RX ORDER — DEXAMETHASONE 0.5 MG/5ML
5 ELIXIR ORAL EVERY 6 HOURS
Qty: 0 | Refills: 0 | Status: COMPLETED | OUTPATIENT
Start: 2019-03-01 | End: 2019-03-02

## 2019-03-01 RX ORDER — SENNA PLUS 8.6 MG/1
2 TABLET ORAL AT BEDTIME
Qty: 0 | Refills: 0 | Status: DISCONTINUED | OUTPATIENT
Start: 2019-03-01 | End: 2019-03-04

## 2019-03-01 RX ORDER — MAGNESIUM HYDROXIDE 400 MG/1
30 TABLET, CHEWABLE ORAL EVERY 12 HOURS
Qty: 0 | Refills: 0 | Status: DISCONTINUED | OUTPATIENT
Start: 2019-03-01 | End: 2019-03-04

## 2019-03-01 RX ORDER — CYCLOBENZAPRINE HYDROCHLORIDE 10 MG/1
5 TABLET, FILM COATED ORAL EVERY 8 HOURS
Qty: 0 | Refills: 0 | Status: DISCONTINUED | OUTPATIENT
Start: 2019-03-01 | End: 2019-03-03

## 2019-03-01 RX ORDER — ONDANSETRON 8 MG/1
4 TABLET, FILM COATED ORAL ONCE
Qty: 0 | Refills: 0 | Status: DISCONTINUED | OUTPATIENT
Start: 2019-03-01 | End: 2019-03-01

## 2019-03-01 RX ORDER — ONDANSETRON 8 MG/1
4 TABLET, FILM COATED ORAL EVERY 6 HOURS
Qty: 0 | Refills: 0 | Status: DISCONTINUED | OUTPATIENT
Start: 2019-03-01 | End: 2019-03-04

## 2019-03-01 RX ORDER — LANOLIN ALCOHOL/MO/W.PET/CERES
5 CREAM (GRAM) TOPICAL AT BEDTIME
Qty: 0 | Refills: 0 | Status: DISCONTINUED | OUTPATIENT
Start: 2019-03-01 | End: 2019-03-04

## 2019-03-01 RX ORDER — CEFAZOLIN SODIUM 1 G
2000 VIAL (EA) INJECTION EVERY 8 HOURS
Qty: 0 | Refills: 0 | Status: COMPLETED | OUTPATIENT
Start: 2019-03-01 | End: 2019-03-02

## 2019-03-01 RX ORDER — ACETAMINOPHEN 500 MG
650 TABLET ORAL EVERY 6 HOURS
Qty: 0 | Refills: 0 | Status: DISCONTINUED | OUTPATIENT
Start: 2019-03-01 | End: 2019-03-04

## 2019-03-01 RX ORDER — DEXAMETHASONE 0.5 MG/5ML
3 ELIXIR ORAL EVERY 6 HOURS
Qty: 0 | Refills: 0 | Status: DISCONTINUED | OUTPATIENT
Start: 2019-03-02 | End: 2019-03-02

## 2019-03-01 RX ORDER — TRAMADOL HYDROCHLORIDE 50 MG/1
100 TABLET ORAL EVERY 6 HOURS
Qty: 0 | Refills: 0 | Status: DISCONTINUED | OUTPATIENT
Start: 2019-03-01 | End: 2019-03-04

## 2019-03-01 RX ORDER — CARBIDOPA AND LEVODOPA 25; 100 MG/1; MG/1
1 TABLET ORAL THREE TIMES A DAY
Qty: 0 | Refills: 0 | Status: DISCONTINUED | OUTPATIENT
Start: 2019-03-01 | End: 2019-03-04

## 2019-03-01 RX ORDER — SODIUM CHLORIDE 9 MG/ML
3 INJECTION INTRAMUSCULAR; INTRAVENOUS; SUBCUTANEOUS EVERY 8 HOURS
Qty: 0 | Refills: 0 | Status: DISCONTINUED | OUTPATIENT
Start: 2019-03-01 | End: 2019-03-01

## 2019-03-01 RX ORDER — MORPHINE SULFATE 50 MG/1
2 CAPSULE, EXTENDED RELEASE ORAL EVERY 4 HOURS
Qty: 0 | Refills: 0 | Status: DISCONTINUED | OUTPATIENT
Start: 2019-03-01 | End: 2019-03-04

## 2019-03-01 RX ORDER — TRAMADOL HYDROCHLORIDE 50 MG/1
50 TABLET ORAL EVERY 4 HOURS
Qty: 0 | Refills: 0 | Status: DISCONTINUED | OUTPATIENT
Start: 2019-03-01 | End: 2019-03-04

## 2019-03-01 RX ORDER — GABAPENTIN 400 MG/1
200 CAPSULE ORAL
Qty: 0 | Refills: 0 | Status: DISCONTINUED | OUTPATIENT
Start: 2019-03-01 | End: 2019-03-04

## 2019-03-01 RX ORDER — RASAGILINE 0.5 MG/1
1 TABLET ORAL DAILY
Qty: 0 | Refills: 0 | Status: DISCONTINUED | OUTPATIENT
Start: 2019-03-01 | End: 2019-03-01

## 2019-03-01 RX ORDER — SODIUM CHLORIDE 9 MG/ML
1000 INJECTION, SOLUTION INTRAVENOUS
Qty: 0 | Refills: 0 | Status: DISCONTINUED | OUTPATIENT
Start: 2019-03-01 | End: 2019-03-04

## 2019-03-01 RX ORDER — AMLODIPINE BESYLATE 2.5 MG/1
2.5 TABLET ORAL DAILY
Qty: 0 | Refills: 0 | Status: DISCONTINUED | OUTPATIENT
Start: 2019-03-01 | End: 2019-03-04

## 2019-03-01 RX ORDER — ATORVASTATIN CALCIUM 80 MG/1
40 TABLET, FILM COATED ORAL AT BEDTIME
Qty: 0 | Refills: 0 | Status: DISCONTINUED | OUTPATIENT
Start: 2019-03-01 | End: 2019-03-04

## 2019-03-01 RX ORDER — MORPHINE SULFATE 50 MG/1
2 CAPSULE, EXTENDED RELEASE ORAL
Qty: 0 | Refills: 0 | Status: DISCONTINUED | OUTPATIENT
Start: 2019-03-01 | End: 2019-03-04

## 2019-03-01 RX ORDER — LEVOTHYROXINE SODIUM 125 MCG
100 TABLET ORAL DAILY
Qty: 0 | Refills: 0 | Status: DISCONTINUED | OUTPATIENT
Start: 2019-03-01 | End: 2019-03-04

## 2019-03-01 RX ORDER — FOLIC ACID 0.8 MG
1 TABLET ORAL DAILY
Qty: 0 | Refills: 0 | Status: DISCONTINUED | OUTPATIENT
Start: 2019-03-01 | End: 2019-03-04

## 2019-03-01 RX ORDER — DOCUSATE SODIUM 100 MG
100 CAPSULE ORAL THREE TIMES A DAY
Qty: 0 | Refills: 0 | Status: DISCONTINUED | OUTPATIENT
Start: 2019-03-01 | End: 2019-03-04

## 2019-03-01 RX ORDER — ASCORBIC ACID 60 MG
500 TABLET,CHEWABLE ORAL
Qty: 0 | Refills: 0 | Status: DISCONTINUED | OUTPATIENT
Start: 2019-03-01 | End: 2019-03-04

## 2019-03-01 RX ORDER — LIDOCAINE HCL 20 MG/ML
0.2 VIAL (ML) INJECTION ONCE
Qty: 0 | Refills: 0 | Status: DISCONTINUED | OUTPATIENT
Start: 2019-03-01 | End: 2019-03-01

## 2019-03-01 RX ORDER — MORPHINE SULFATE 50 MG/1
2 CAPSULE, EXTENDED RELEASE ORAL ONCE
Qty: 0 | Refills: 0 | Status: DISCONTINUED | OUTPATIENT
Start: 2019-03-01 | End: 2019-03-01

## 2019-03-01 RX ORDER — FAMOTIDINE 10 MG/ML
20 INJECTION INTRAVENOUS DAILY
Qty: 0 | Refills: 0 | Status: DISCONTINUED | OUTPATIENT
Start: 2019-03-01 | End: 2019-03-04

## 2019-03-01 RX ORDER — BENZOCAINE AND MENTHOL 5; 1 G/100ML; G/100ML
1 LIQUID ORAL
Qty: 0 | Refills: 0 | Status: DISCONTINUED | OUTPATIENT
Start: 2019-03-01 | End: 2019-03-04

## 2019-03-01 RX ADMIN — HYDROMORPHONE HYDROCHLORIDE 0.5 MILLIGRAM(S): 2 INJECTION INTRAMUSCULAR; INTRAVENOUS; SUBCUTANEOUS at 15:36

## 2019-03-01 RX ADMIN — MORPHINE SULFATE 2 MILLIGRAM(S): 50 CAPSULE, EXTENDED RELEASE ORAL at 17:00

## 2019-03-01 RX ADMIN — MORPHINE SULFATE 2 MILLIGRAM(S): 50 CAPSULE, EXTENDED RELEASE ORAL at 16:50

## 2019-03-01 RX ADMIN — SENNA PLUS 2 TABLET(S): 8.6 TABLET ORAL at 23:32

## 2019-03-01 RX ADMIN — MORPHINE SULFATE 2 MILLIGRAM(S): 50 CAPSULE, EXTENDED RELEASE ORAL at 16:35

## 2019-03-01 RX ADMIN — HYDROMORPHONE HYDROCHLORIDE 0.25 MILLIGRAM(S): 2 INJECTION INTRAMUSCULAR; INTRAVENOUS; SUBCUTANEOUS at 16:09

## 2019-03-01 RX ADMIN — Medication 1 TABLET(S): at 23:32

## 2019-03-01 RX ADMIN — HYDROMORPHONE HYDROCHLORIDE 0.25 MILLIGRAM(S): 2 INJECTION INTRAMUSCULAR; INTRAVENOUS; SUBCUTANEOUS at 16:31

## 2019-03-01 RX ADMIN — TRAMADOL HYDROCHLORIDE 50 MILLIGRAM(S): 50 TABLET ORAL at 23:43

## 2019-03-01 RX ADMIN — GABAPENTIN 200 MILLIGRAM(S): 400 CAPSULE ORAL at 17:58

## 2019-03-01 RX ADMIN — HYDROMORPHONE HYDROCHLORIDE 0.25 MILLIGRAM(S): 2 INJECTION INTRAMUSCULAR; INTRAVENOUS; SUBCUTANEOUS at 16:19

## 2019-03-01 RX ADMIN — HYDROMORPHONE HYDROCHLORIDE 0.5 MILLIGRAM(S): 2 INJECTION INTRAMUSCULAR; INTRAVENOUS; SUBCUTANEOUS at 15:15

## 2019-03-01 RX ADMIN — MORPHINE SULFATE 2 MILLIGRAM(S): 50 CAPSULE, EXTENDED RELEASE ORAL at 17:15

## 2019-03-01 RX ADMIN — HYDROMORPHONE HYDROCHLORIDE 0.25 MILLIGRAM(S): 2 INJECTION INTRAMUSCULAR; INTRAVENOUS; SUBCUTANEOUS at 16:07

## 2019-03-01 RX ADMIN — Medication 100 MILLIGRAM(S): at 23:32

## 2019-03-01 RX ADMIN — Medication 5 MILLIGRAM(S): at 18:05

## 2019-03-01 RX ADMIN — Medication 100 MILLIGRAM(S): at 20:19

## 2019-03-01 RX ADMIN — CARBIDOPA AND LEVODOPA 1 TABLET(S): 25; 100 TABLET ORAL at 23:33

## 2019-03-01 RX ADMIN — ATORVASTATIN CALCIUM 40 MILLIGRAM(S): 80 TABLET, FILM COATED ORAL at 23:32

## 2019-03-01 RX ADMIN — CARBIDOPA AND LEVODOPA 1 TABLET(S): 25; 100 TABLET ORAL at 16:46

## 2019-03-01 RX ADMIN — Medication 5 MILLIGRAM(S): at 23:45

## 2019-03-01 RX ADMIN — HYDROMORPHONE HYDROCHLORIDE 0.25 MILLIGRAM(S): 2 INJECTION INTRAMUSCULAR; INTRAVENOUS; SUBCUTANEOUS at 15:35

## 2019-03-01 NOTE — PHYSICAL THERAPY INITIAL EVALUATION ADULT - MANUAL MUSCLE TESTING RESULTS, REHAB EVAL
grossly assessed due to/BUE appearing 3/5 based on pt ad liz movements, L hip flexion 3-/5, L knee extension 3/5, L ankle dorsiflexion 3-/5; RLE appearing 3/5

## 2019-03-01 NOTE — CHART NOTE - NSCHARTNOTEFT_GEN_A_CORE
Patient seen in RR. Patient Resting with complaints of aching left leg pain.  Denies Chest Pain, SOB, N/V.  Pre op s/sx: low back pain with radiculopathy to left lower extremity  ; Post op, patient reports: moderate left leg aching.    T(C): 36.4 (03-01-19 @ 14:50), Max: 36.6 (03-01-19 @ 09:56)  HR: 87 (03-01-19 @ 15:45) (73 - 87)  BP: 190/79 (03-01-19 @ 15:45) (138/78 - 193/89)  RR: 16 (03-01-19 @ 15:45) (16 - 18)  SpO2: 100% (03-01-19 @ 15:45) (100% - 100%)  Wt(kg): --    Exam:   Alert/Oriented, Patient looks uncomfortable, having pain to her left leg  Cards: +S1/S2, RRR  Pulm: CTAB           Dressing: [x] clean/dry/intact  [ ] Other:           Drains: : HV x 1; maintaining good suction         Sensation: [x] intact to light touch  [ ] decreased:          Motor exam: [  ]   [ ] Lower extremity; limited 2/2 pain    PF          DF         EHL       FHL                                                                                            R        4/5        4/5        4/5       4/5                                                        L         4/5        4/5        4/5       4/5                                                                  Calves Soft/Non-tender bilaterally         Toes warm well perfused; capillary refill <3 seconds          + Cunningham in place; draining clear, yellow fluid           LABS:                        13.6   15.1  )-----------( 253      ( 01 Mar 2019 16:08 )             40.8               A/P : Patient is a 78y Female s/p L4-L5 laminectomy with fusion  -    Pain control PRN; IV pain medications adjusted in RR; will consider PCA if pain still uncontrolled  -    Taper  -    DVT ppx: SCDs       -    Physical Therapy  -    Weight bearing status: WBAT [x]        PWB    [ ]     TTWB  [ ]      NWB  [ ]  -    Continue home meds  -    FU AM Labs      Suellen Mora PA-C  Team Pager #[phone]

## 2019-03-01 NOTE — CHART NOTE - NSCHARTNOTEFT_GEN_A_CORE
Evaluate and measure for custom LSO. Measurements taken without incident. Orthosis to be fit and delivered 3/4/19  Steve GARCIA  [facility]  [phone]

## 2019-03-01 NOTE — CHART NOTE - NSCHARTNOTEFT_GEN_A_CORE
Patient visited down in RR to re-assess whether she would benefit from a PCA pump s/p L4-L5 laminectomy with fusion.     Patient seen with Physical therapist. Patient states her pain level was pretty bad while laying in bed. Once OOB to chair, patients pain level improved tremendously, stating "it's getting better by the minute". She states she feels more comfortable with her legs dangling rather than up in bed. Pt tolerated physical therapy session well. She states she feels a "little groggy" from the pain medicine she has received in the RR but feels a lot better now that she is sitting up in the recliner chair.     Currently, patient's pain seems well controlled on PRN Morphine and Flexeril down in RR. Will continue to assess and will order PCA if pain is no longer controlled.     Suellen Mora PA-C Orthopaedic Surgery  Team pager [phone]  [phone]

## 2019-03-01 NOTE — BRIEF OPERATIVE NOTE - PROCEDURE
Spinal fusion  03/01/2019  Posterolateral non-instrumented fusion, L4-5  Active  TRINO <<-----Click on this checkbox to enter Procedure

## 2019-03-01 NOTE — PHYSICAL THERAPY INITIAL EVALUATION ADULT - ADDITIONAL COMMENTS
Pt uncertain historian. Pt states she lives alone in an apartment, can enter without negotiating steps, +elevator access. Pt states prior to admission being independent with all functional mobility and ADLs. Pt states she was ambulatory with a RW prior to admission and was driving. Following session pt reported she has had trouble putting on her socks and has a HHA, pt did not elaborate further.

## 2019-03-01 NOTE — PHYSICAL THERAPY INITIAL EVALUATION ADULT - PASSIVE RANGE OF MOTION EXAMINATION, REHAB EVAL
b/l UE deferred 2/2 noted tension/guarding/bilateral lower extremity Passive ROM was WFL (within functional limits)

## 2019-03-01 NOTE — PHYSICAL THERAPY INITIAL EVALUATION ADULT - PERTINENT HX OF CURRENT PROBLEM, REHAB EVAL
77 y/o F with PMH of Hypothyroidism, parkinson's.  Pt has been experiencing low back pain, radiating to the L hip, groin pain, impacting ADL's and quality of life,  followed by pain/neurology dx spinal stenosis, herniated disc and spondylolisthesis, failed PT, NSAIDS, and ELE, impacting ADL's and quality of life. Pt now presents s/p L4-L5 laminectomy with fusion.

## 2019-03-01 NOTE — PHYSICAL THERAPY INITIAL EVALUATION ADULT - GENERAL OBSERVATIONS, REHAB EVAL
Pt brayan 30 min eval fair. RN and SCOTT Ken spoke with MD, pt ok for OOB without brace. Pt with h/o PD, minimal eye contact during session, able to when asked. Pt rec'd in R sidelying in bed, flexed/tense posture, peripheral IV line. Pt with c/o discomfort, asking to get OOB for relief, SCOTT Ken and RN present throughout session. Pt reported improvement in symptoms sitting in chair.

## 2019-03-01 NOTE — PHYSICAL THERAPY INITIAL EVALUATION ADULT - TRANSFER TRAINING, PT EVAL
GOAL: Pt will transfer sit to/from stand with least restrictive device as appropriate independently within 4 weeks

## 2019-03-02 LAB
ANION GAP SERPL CALC-SCNC: 11 MMOL/L — SIGNIFICANT CHANGE UP (ref 5–17)
BASOPHILS # BLD AUTO: 0.02 K/UL — SIGNIFICANT CHANGE UP (ref 0–0.2)
BASOPHILS NFR BLD AUTO: 0.1 % — SIGNIFICANT CHANGE UP (ref 0–2)
BUN SERPL-MCNC: 16 MG/DL — SIGNIFICANT CHANGE UP (ref 7–23)
CALCIUM SERPL-MCNC: 8.6 MG/DL — SIGNIFICANT CHANGE UP (ref 8.4–10.5)
CHLORIDE SERPL-SCNC: 103 MMOL/L — SIGNIFICANT CHANGE UP (ref 96–108)
CO2 SERPL-SCNC: 25 MMOL/L — SIGNIFICANT CHANGE UP (ref 22–31)
CREAT SERPL-MCNC: 0.59 MG/DL — SIGNIFICANT CHANGE UP (ref 0.5–1.3)
EOSINOPHIL # BLD AUTO: 0 K/UL — SIGNIFICANT CHANGE UP (ref 0–0.5)
EOSINOPHIL NFR BLD AUTO: 0 % — SIGNIFICANT CHANGE UP (ref 0–6)
GLUCOSE SERPL-MCNC: 130 MG/DL — HIGH (ref 70–99)
HCT VFR BLD CALC: 33.2 % — LOW (ref 34.5–45)
HGB BLD-MCNC: 10.6 G/DL — LOW (ref 11.5–15.5)
IMM GRANULOCYTES NFR BLD AUTO: 0.8 % — SIGNIFICANT CHANGE UP (ref 0–1.5)
LYMPHOCYTES # BLD AUTO: 0.98 K/UL — LOW (ref 1–3.3)
LYMPHOCYTES # BLD AUTO: 6.8 % — LOW (ref 13–44)
MCHC RBC-ENTMCNC: 30.4 PG — SIGNIFICANT CHANGE UP (ref 27–34)
MCHC RBC-ENTMCNC: 31.9 GM/DL — LOW (ref 32–36)
MCV RBC AUTO: 95.1 FL — SIGNIFICANT CHANGE UP (ref 80–100)
MONOCYTES # BLD AUTO: 0.69 K/UL — SIGNIFICANT CHANGE UP (ref 0–0.9)
MONOCYTES NFR BLD AUTO: 4.8 % — SIGNIFICANT CHANGE UP (ref 2–14)
NEUTROPHILS # BLD AUTO: 12.57 K/UL — HIGH (ref 1.8–7.4)
NEUTROPHILS NFR BLD AUTO: 87.5 % — HIGH (ref 43–77)
PLATELET # BLD AUTO: 253 K/UL — SIGNIFICANT CHANGE UP (ref 150–400)
POTASSIUM SERPL-MCNC: 3.8 MMOL/L — SIGNIFICANT CHANGE UP (ref 3.5–5.3)
POTASSIUM SERPL-SCNC: 3.8 MMOL/L — SIGNIFICANT CHANGE UP (ref 3.5–5.3)
RBC # BLD: 3.49 M/UL — LOW (ref 3.8–5.2)
RBC # FLD: 13.2 % — SIGNIFICANT CHANGE UP (ref 10.3–14.5)
SODIUM SERPL-SCNC: 139 MMOL/L — SIGNIFICANT CHANGE UP (ref 135–145)
WBC # BLD: 14.38 K/UL — HIGH (ref 3.8–10.5)
WBC # FLD AUTO: 14.38 K/UL — HIGH (ref 3.8–10.5)

## 2019-03-02 RX ORDER — DEXAMETHASONE 0.5 MG/5ML
3 ELIXIR ORAL EVERY 6 HOURS
Qty: 0 | Refills: 0 | Status: COMPLETED | OUTPATIENT
Start: 2019-03-02 | End: 2019-03-03

## 2019-03-02 RX ORDER — DEXAMETHASONE 0.5 MG/5ML
1 ELIXIR ORAL EVERY 6 HOURS
Qty: 0 | Refills: 0 | Status: COMPLETED | OUTPATIENT
Start: 2019-03-03 | End: 2019-03-04

## 2019-03-02 RX ORDER — DEXAMETHASONE 0.5 MG/5ML
ELIXIR ORAL
Qty: 0 | Refills: 0 | Status: COMPLETED | OUTPATIENT
Start: 2019-03-02 | End: 2019-03-04

## 2019-03-02 RX ADMIN — Medication 5 MILLIGRAM(S): at 06:01

## 2019-03-02 RX ADMIN — Medication 1 TABLET(S): at 12:00

## 2019-03-02 RX ADMIN — TRAMADOL HYDROCHLORIDE 100 MILLIGRAM(S): 50 TABLET ORAL at 19:10

## 2019-03-02 RX ADMIN — Medication 1 TABLET(S): at 14:16

## 2019-03-02 RX ADMIN — AMLODIPINE BESYLATE 2.5 MILLIGRAM(S): 2.5 TABLET ORAL at 06:01

## 2019-03-02 RX ADMIN — CARBIDOPA AND LEVODOPA 1 TABLET(S): 25; 100 TABLET ORAL at 22:18

## 2019-03-02 RX ADMIN — ATORVASTATIN CALCIUM 40 MILLIGRAM(S): 80 TABLET, FILM COATED ORAL at 22:18

## 2019-03-02 RX ADMIN — Medication 1 MILLIGRAM(S): at 12:00

## 2019-03-02 RX ADMIN — Medication 3 MILLIGRAM(S): at 22:33

## 2019-03-02 RX ADMIN — Medication 100 MILLIGRAM(S): at 22:18

## 2019-03-02 RX ADMIN — Medication 100 MILLIGRAM(S): at 02:34

## 2019-03-02 RX ADMIN — Medication 100 MILLIGRAM(S): at 05:57

## 2019-03-02 RX ADMIN — TRAMADOL HYDROCHLORIDE 100 MILLIGRAM(S): 50 TABLET ORAL at 10:26

## 2019-03-02 RX ADMIN — Medication 3 MILLIGRAM(S): at 17:47

## 2019-03-02 RX ADMIN — CYCLOBENZAPRINE HYDROCHLORIDE 5 MILLIGRAM(S): 10 TABLET, FILM COATED ORAL at 22:18

## 2019-03-02 RX ADMIN — SENNA PLUS 2 TABLET(S): 8.6 TABLET ORAL at 22:18

## 2019-03-02 RX ADMIN — Medication 1 TABLET(S): at 05:57

## 2019-03-02 RX ADMIN — TRAMADOL HYDROCHLORIDE 100 MILLIGRAM(S): 50 TABLET ORAL at 10:50

## 2019-03-02 RX ADMIN — TRAMADOL HYDROCHLORIDE 100 MILLIGRAM(S): 50 TABLET ORAL at 18:38

## 2019-03-02 RX ADMIN — GABAPENTIN 200 MILLIGRAM(S): 400 CAPSULE ORAL at 05:57

## 2019-03-02 RX ADMIN — Medication 100 MILLIGRAM(S): at 14:16

## 2019-03-02 RX ADMIN — Medication 100 MICROGRAM(S): at 05:57

## 2019-03-02 RX ADMIN — Medication 500 MILLIGRAM(S): at 05:57

## 2019-03-02 RX ADMIN — CARBIDOPA AND LEVODOPA 1 TABLET(S): 25; 100 TABLET ORAL at 14:16

## 2019-03-02 RX ADMIN — CARBIDOPA AND LEVODOPA 1 TABLET(S): 25; 100 TABLET ORAL at 05:57

## 2019-03-02 RX ADMIN — Medication 500 MILLIGRAM(S): at 17:47

## 2019-03-02 RX ADMIN — Medication 5 MILLIGRAM(S): at 12:06

## 2019-03-02 RX ADMIN — FAMOTIDINE 20 MILLIGRAM(S): 10 INJECTION INTRAVENOUS at 12:00

## 2019-03-02 RX ADMIN — Medication 1 TABLET(S): at 22:18

## 2019-03-02 RX ADMIN — TRAMADOL HYDROCHLORIDE 50 MILLIGRAM(S): 50 TABLET ORAL at 03:15

## 2019-03-02 RX ADMIN — GABAPENTIN 200 MILLIGRAM(S): 400 CAPSULE ORAL at 17:47

## 2019-03-02 NOTE — CONSULT NOTE ADULT - SUBJECTIVE AND OBJECTIVE BOX
Patient is a 78y old  Female who presents with a chief complaint of Lower back pain/ Posterior fusion L4-L5 (02 Mar 2019 08:09)      HPI:  79 yo female with PMH of HTN, HLD, Hypothyroidism parkinson's.  Pt has been experiencing low back pain , radiating to the left hip, groin pain, impacting ADL's and quality of life,  followed by pain/neurology dx spinal stenosis, herniated disc and spondylolisthesis, failed PT, NSAIDS, and EEL, impacting ADL's and quality of life. S/p Posterior Lumbar Laminectomy  & Instrumented  Spinal Fusion with Interbody on 3/1/2019. (22 Feb 2019 10:26)      PAST MEDICAL & SURGICAL HISTORY:  MVP (mitral valve prolapse): -no cardiology  Edema of lower extremity: seen by PMD doppler done -as per patient normal  MARK positive  Spondylolisthesis of lumbar region  Hypothyroidism  Osteoporosis  HLD (hyperlipidemia)  Essential hypertension  Parkinson's disease: dx 2012  H/O colonoscopy: 2018  History of tonsillectomy: childhood  History of shoulder surgery: Right- long time ago  History of thyroidectomy, total: 1992      Review of Systems:     RESPIRATORY: No cough, wheezing, chills or hemoptysis; No shortness of breath  CARDIOVASCULAR: No chest pain, palpitations, dizziness, or leg swelling  GASTROINTESTINAL: No abdominal or epigastric pain. No nausea, vomiting, or hematemesis; No diarrhea or constipation.             Allergies    No Known Allergies    Intolerances        Social History:     FAMILY HISTORY:      MEDICATIONS  (STANDING):  amLODIPine   Tablet 2.5 milliGRAM(s) Oral daily  ascorbic acid 500 milliGRAM(s) Oral two times a day  atorvastatin 40 milliGRAM(s) Oral at bedtime  calcium carbonate 1250 mG  + Vitamin D (OsCal 500 + D) 1 Tablet(s) Oral three times a day  carbidopa/levodopa  25/100 1 Tablet(s) Oral three times a day  ceFAZolin   IVPB 2000 milliGRAM(s) IV Intermittent once  dexamethasone     Tablet 3 milliGRAM(s) Oral every 6 hours  dexamethasone     Tablet   Oral   docusate sodium 100 milliGRAM(s) Oral three times a day  famotidine    Tablet 20 milliGRAM(s) Oral daily  folic acid 1 milliGRAM(s) Oral daily  gabapentin 200 milliGRAM(s) Oral two times a day  lactated ringers. 1000 milliLiter(s) (125 mL/Hr) IV Continuous <Continuous>  levothyroxine 100 MICROGram(s) Oral daily  multivitamin 1 Tablet(s) Oral daily  senna 2 Tablet(s) Oral at bedtime    MEDICATIONS  (PRN):  acetaminophen   Tablet .. 650 milliGRAM(s) Oral every 6 hours PRN Mild Pain (1 - 3)  benzocaine 15 mG/menthol 3.6 mG Lozenge 1 Lozenge Oral every 2 hours PRN Sore Throat  cyclobenzaprine 5 milliGRAM(s) Oral every 8 hours PRN Muscle Spasm  magnesium hydroxide Suspension 30 milliLiter(s) Oral every 12 hours PRN Constipation  melatonin 5 milliGRAM(s) Oral at bedtime PRN Insomnia  morphine  - Injectable 2 milliGRAM(s) IV Push every 15 minutes PRN Severe Pain (7 - 10)  morphine  - Injectable 2 milliGRAM(s) IV Push every 4 hours PRN Break through pain  ondansetron Injectable 4 milliGRAM(s) IV Push every 6 hours PRN Nausea  traMADol 50 milliGRAM(s) Oral every 4 hours PRN Moderate Pain (4 - 6)  traMADol 100 milliGRAM(s) Oral every 6 hours PRN Severe Pain (7 - 10)      CAPILLARY BLOOD GLUCOSE        I&O's Summary    01 Mar 2019 07:01  -  02 Mar 2019 07:00  --------------------------------------------------------  IN: 2025 mL / OUT: 1345 mL / NET: 680 mL    02 Mar 2019 07:01  -  02 Mar 2019 17:13  --------------------------------------------------------  IN: 860 mL / OUT: 1435 mL / NET: -575 mL        PHYSICAL EXAM:    GENERAL: NAD  CHEST/LUNG: Clear to auscultation bilaterally; No wheezing.  HEART: Regular rate and rhythm; No murmurs, rubs, or gallops  ABDOMEN: Soft, Nontender, Nondistended; Bowel sounds present  EXTREMITIES:  2+ Peripheral Pulses, No edema  NEUROLOGY: AAOx 3      LABS:                        10.6   14.38 )-----------( 253      ( 02 Mar 2019 10:01 )             33.2     03-02    139  |  103  |  16  ----------------------------<  130<H>  3.8   |  25  |  0.59    Ca    8.6      02 Mar 2019 06:51              CAPILLARY BLOOD GLUCOSE                    RADIOLOGY & ADDITIONAL TESTS:    Imaging Personally Reviewed:    Consultant(s) Notes Reviewed:      Care Discussed with Consultants/Other Providers:    Thanks for consult. Will follow.

## 2019-03-02 NOTE — CONSULT NOTE ADULT - ASSESSMENT
Patient is a 78y old  Female who presents with a chief complaint of Lower back pain/ Posterior fusion L4-L5 (02 Mar 2019 08:09).    S/p Posterior Lumbar Laminectomy  & Instrumented  Spinal Fusion with Interbody.    Pain control with morphine/Tramadol  Ortho spine f/up noted.    Parkinson's Dx:  Sinemet    HTN:  Cw Amlodipine    Hypothyroidism:  Cw synthroid

## 2019-03-02 NOTE — PROGRESS NOTE ADULT - ASSESSMENT
Impression: Stable       Plan:   Continue present treatment                 Out of bed, ambulate                  Physical therapy evaluation                  Continue to monitor    Mika Barriga PA-C  Orthopaedic Surgery  Team pager [phone]  [phone]

## 2019-03-02 NOTE — PROGRESS NOTE ADULT - SUBJECTIVE AND OBJECTIVE BOX
ORTHO  Patient is a 78y old  Female who presents with a chief complaint of "I am having a back surgery" (22 Feb 2019 10:26)    Pt. resting without complaint    VS-  T(C): 36.8 (03-02-19 @ 04:36), Max: 36.9 (03-01-19 @ 18:00)  HR: 91 (03-02-19 @ 04:36) (73 - 101)  BP: 147/68 (03-02-19 @ 04:36) (96/52 - 206/79)  RR: 17 (03-02-19 @ 04:36) (14 - 18)  SpO2: 93% (03-02-19 @ 04:36) (93% - 100%)  Wt(kg): --    M.S. A&O  Lower back- dressing C/D/I, Hemovac to self suction- 115 cc/shift  Neuro-              Motor- Bilat LE ankle and EHL 4+/5              Sensation- grossly intact to light touch              Calves- soft, nontender- PAS                               13.6   15.1  )-----------( 253      ( 01 Mar 2019 16:08 )             40.8     03-02    139  |  103  |  16  ----------------------------<  130<H>  3.8   |  25  |  0.59    Ca    8.6      02 Mar 2019 06:51

## 2019-03-03 LAB
ANION GAP SERPL CALC-SCNC: 14 MMOL/L — SIGNIFICANT CHANGE UP (ref 5–17)
BASOPHILS # BLD AUTO: 0.01 K/UL — SIGNIFICANT CHANGE UP (ref 0–0.2)
BASOPHILS NFR BLD AUTO: 0.1 % — SIGNIFICANT CHANGE UP (ref 0–2)
BUN SERPL-MCNC: 14 MG/DL — SIGNIFICANT CHANGE UP (ref 7–23)
CALCIUM SERPL-MCNC: 9 MG/DL — SIGNIFICANT CHANGE UP (ref 8.4–10.5)
CHLORIDE SERPL-SCNC: 102 MMOL/L — SIGNIFICANT CHANGE UP (ref 96–108)
CO2 SERPL-SCNC: 26 MMOL/L — SIGNIFICANT CHANGE UP (ref 22–31)
CREAT SERPL-MCNC: 0.51 MG/DL — SIGNIFICANT CHANGE UP (ref 0.5–1.3)
EOSINOPHIL # BLD AUTO: 0 K/UL — SIGNIFICANT CHANGE UP (ref 0–0.5)
EOSINOPHIL NFR BLD AUTO: 0 % — SIGNIFICANT CHANGE UP (ref 0–6)
GLUCOSE SERPL-MCNC: 115 MG/DL — HIGH (ref 70–99)
HCT VFR BLD CALC: 35.7 % — SIGNIFICANT CHANGE UP (ref 34.5–45)
HGB BLD-MCNC: 11.1 G/DL — LOW (ref 11.5–15.5)
IMM GRANULOCYTES NFR BLD AUTO: 0.6 % — SIGNIFICANT CHANGE UP (ref 0–1.5)
LYMPHOCYTES # BLD AUTO: 1.33 K/UL — SIGNIFICANT CHANGE UP (ref 1–3.3)
LYMPHOCYTES # BLD AUTO: 7.8 % — LOW (ref 13–44)
MCHC RBC-ENTMCNC: 29.4 PG — SIGNIFICANT CHANGE UP (ref 27–34)
MCHC RBC-ENTMCNC: 31.1 GM/DL — LOW (ref 32–36)
MCV RBC AUTO: 94.7 FL — SIGNIFICANT CHANGE UP (ref 80–100)
MONOCYTES # BLD AUTO: 1.19 K/UL — HIGH (ref 0–0.9)
MONOCYTES NFR BLD AUTO: 7 % — SIGNIFICANT CHANGE UP (ref 2–14)
NEUTROPHILS # BLD AUTO: 14.45 K/UL — HIGH (ref 1.8–7.4)
NEUTROPHILS NFR BLD AUTO: 84.5 % — HIGH (ref 43–77)
PLATELET # BLD AUTO: 271 K/UL — SIGNIFICANT CHANGE UP (ref 150–400)
POTASSIUM SERPL-MCNC: 3.4 MMOL/L — LOW (ref 3.5–5.3)
POTASSIUM SERPL-SCNC: 3.4 MMOL/L — LOW (ref 3.5–5.3)
RBC # BLD: 3.77 M/UL — LOW (ref 3.8–5.2)
RBC # FLD: 13.3 % — SIGNIFICANT CHANGE UP (ref 10.3–14.5)
SODIUM SERPL-SCNC: 142 MMOL/L — SIGNIFICANT CHANGE UP (ref 135–145)
WBC # BLD: 17.08 K/UL — HIGH (ref 3.8–10.5)
WBC # FLD AUTO: 17.08 K/UL — HIGH (ref 3.8–10.5)

## 2019-03-03 RX ORDER — POTASSIUM CHLORIDE 20 MEQ
20 PACKET (EA) ORAL
Qty: 0 | Refills: 0 | Status: COMPLETED | OUTPATIENT
Start: 2019-03-03 | End: 2019-03-03

## 2019-03-03 RX ORDER — DIAZEPAM 5 MG
2 TABLET ORAL EVERY 6 HOURS
Qty: 0 | Refills: 0 | Status: DISCONTINUED | OUTPATIENT
Start: 2019-03-03 | End: 2019-03-04

## 2019-03-03 RX ADMIN — CARBIDOPA AND LEVODOPA 1 TABLET(S): 25; 100 TABLET ORAL at 21:11

## 2019-03-03 RX ADMIN — TRAMADOL HYDROCHLORIDE 100 MILLIGRAM(S): 50 TABLET ORAL at 21:12

## 2019-03-03 RX ADMIN — Medication 500 MILLIGRAM(S): at 17:24

## 2019-03-03 RX ADMIN — CARBIDOPA AND LEVODOPA 1 TABLET(S): 25; 100 TABLET ORAL at 13:09

## 2019-03-03 RX ADMIN — GABAPENTIN 200 MILLIGRAM(S): 400 CAPSULE ORAL at 17:24

## 2019-03-03 RX ADMIN — CARBIDOPA AND LEVODOPA 1 TABLET(S): 25; 100 TABLET ORAL at 05:25

## 2019-03-03 RX ADMIN — Medication 1 TABLET(S): at 21:10

## 2019-03-03 RX ADMIN — Medication 1 TABLET(S): at 13:09

## 2019-03-03 RX ADMIN — Medication 1 MILLIGRAM(S): at 11:15

## 2019-03-03 RX ADMIN — Medication 100 MILLIGRAM(S): at 21:12

## 2019-03-03 RX ADMIN — ATORVASTATIN CALCIUM 40 MILLIGRAM(S): 80 TABLET, FILM COATED ORAL at 21:11

## 2019-03-03 RX ADMIN — Medication 100 MILLIGRAM(S): at 05:25

## 2019-03-03 RX ADMIN — Medication 20 MILLIEQUIVALENT(S): at 13:09

## 2019-03-03 RX ADMIN — Medication 500 MILLIGRAM(S): at 05:25

## 2019-03-03 RX ADMIN — Medication 1 TABLET(S): at 05:25

## 2019-03-03 RX ADMIN — Medication 100 MICROGRAM(S): at 05:25

## 2019-03-03 RX ADMIN — GABAPENTIN 200 MILLIGRAM(S): 400 CAPSULE ORAL at 05:25

## 2019-03-03 RX ADMIN — SENNA PLUS 2 TABLET(S): 8.6 TABLET ORAL at 21:10

## 2019-03-03 RX ADMIN — FAMOTIDINE 20 MILLIGRAM(S): 10 INJECTION INTRAVENOUS at 11:15

## 2019-03-03 RX ADMIN — Medication 1 TABLET(S): at 11:15

## 2019-03-03 RX ADMIN — TRAMADOL HYDROCHLORIDE 100 MILLIGRAM(S): 50 TABLET ORAL at 05:55

## 2019-03-03 RX ADMIN — Medication 1 MILLIGRAM(S): at 17:24

## 2019-03-03 RX ADMIN — AMLODIPINE BESYLATE 2.5 MILLIGRAM(S): 2.5 TABLET ORAL at 05:25

## 2019-03-03 RX ADMIN — TRAMADOL HYDROCHLORIDE 100 MILLIGRAM(S): 50 TABLET ORAL at 21:42

## 2019-03-03 RX ADMIN — Medication 3 MILLIGRAM(S): at 06:16

## 2019-03-03 RX ADMIN — Medication 100 MILLIGRAM(S): at 13:09

## 2019-03-03 RX ADMIN — TRAMADOL HYDROCHLORIDE 100 MILLIGRAM(S): 50 TABLET ORAL at 05:25

## 2019-03-03 RX ADMIN — Medication 20 MILLIEQUIVALENT(S): at 11:15

## 2019-03-03 NOTE — PROGRESS NOTE ADULT - ASSESSMENT
Patient is a 78y old  Female who presents with a chief complaint of Lower back pain/ Posterior fusion L4-L5 (02 Mar 2019 08:09).    S/p Posterior Lumbar Laminectomy  & Instrumented  Spinal Fusion with Interbody.    Pain control with morphine/Tramadol  Ortho spine f/up noted.    Parkinson's Dx:  Sinemet    HTN:  Cw Amlodipine    Hypothyroidism:  Cw synthroid Patient is a 78y old  Female who presents with a chief complaint of Lower back pain/ Posterior fusion L4-L5 (02 Mar 2019 08:09).    S/p Posterior Lumbar Laminectomy  & Instrumented  Spinal Fusion with Interbody.    Pain control with morphine/Tramadol  Ortho spine f/up noted.    Parkinson's Dx:  Sinemet    Leukocytosis:  CBC    HTN:  Cw Amlodipine    Hypothyroidism:  Cw synthroid

## 2019-03-03 NOTE — PROGRESS NOTE ADULT - SUBJECTIVE AND OBJECTIVE BOX
ORTHO  Patient is a 78y old  Female who presents with a chief complaint of Lower back pain/ Posterior fusion L4-L5 (02 Mar 2019 08:09)    Pt. resting without complaint    VS-  T(C): 37.3 (03-03-19 @ 05:11), Max: 37.3 (03-03-19 @ 05:11)  HR: 83 (03-03-19 @ 05:11) (72 - 89)  BP: 158/75 (03-03-19 @ 05:11) (142/70 - 175/74)  RR: 18 (03-03-19 @ 05:11) (18 - 18)  SpO2: 94% (03-03-19 @ 05:11) (93% - 96%)  Wt(kg): --    M.S. A&O     Lower back - dressing C/D/I, Hemovac- 60 cc/shift  Neuro-              Motor-(+) AROM ankle and EHL Rt 5+/5, Lt 3-4+/5              Sensation- grossly intact to light touch              Calves- soft, nontender- PAS                               10.6   14.38 )-----------( 253      ( 02 Mar 2019 10:01 )             33.2     03-02    139  |  103  |  16  ----------------------------<  130<H>  3.8   |  25  |  0.59    Ca    8.6      02 Mar 2019 06:51

## 2019-03-03 NOTE — PROGRESS NOTE ADULT - SUBJECTIVE AND OBJECTIVE BOX
Patient is a 78y old  Female who presents with a chief complaint of low back pain/ PSF L4-L5 (03 Mar 2019 07:23)      SUBJECTIVE / OVERNIGHT EVENTS:    Events noted.  RESPIRATORY: No cough, wheezing, chills or hemoptysis; No shortness of breath  CARDIOVASCULAR: No chest pain, palpitations, dizziness, or leg swelling  GASTROINTESTINAL: No abdominal or epigastric pain. No nausea, vomiting, or hematemesis; No diarrhea or constipation.   NEUROLOGICAL: No headaches,     MEDICATIONS  (STANDING):  amLODIPine   Tablet 2.5 milliGRAM(s) Oral daily  ascorbic acid 500 milliGRAM(s) Oral two times a day  atorvastatin 40 milliGRAM(s) Oral at bedtime  calcium carbonate 1250 mG  + Vitamin D (OsCal 500 + D) 1 Tablet(s) Oral three times a day  carbidopa/levodopa  25/100 1 Tablet(s) Oral three times a day  ceFAZolin   IVPB 2000 milliGRAM(s) IV Intermittent once  dexamethasone     Tablet 1 milliGRAM(s) Oral every 6 hours  dexamethasone     Tablet   Oral   docusate sodium 100 milliGRAM(s) Oral three times a day  famotidine    Tablet 20 milliGRAM(s) Oral daily  folic acid 1 milliGRAM(s) Oral daily  gabapentin 200 milliGRAM(s) Oral two times a day  lactated ringers. 1000 milliLiter(s) (125 mL/Hr) IV Continuous <Continuous>  levothyroxine 100 MICROGram(s) Oral daily  multivitamin 1 Tablet(s) Oral daily  senna 2 Tablet(s) Oral at bedtime    MEDICATIONS  (PRN):  acetaminophen   Tablet .. 650 milliGRAM(s) Oral every 6 hours PRN Mild Pain (1 - 3)  benzocaine 15 mG/menthol 3.6 mG Lozenge 1 Lozenge Oral every 2 hours PRN Sore Throat  diazepam    Tablet 2 milliGRAM(s) Oral every 6 hours PRN spasms  magnesium hydroxide Suspension 30 milliLiter(s) Oral every 12 hours PRN Constipation  melatonin 5 milliGRAM(s) Oral at bedtime PRN Insomnia  morphine  - Injectable 2 milliGRAM(s) IV Push every 15 minutes PRN Severe Pain (7 - 10)  morphine  - Injectable 2 milliGRAM(s) IV Push every 4 hours PRN Break through pain  ondansetron Injectable 4 milliGRAM(s) IV Push every 6 hours PRN Nausea  traMADol 50 milliGRAM(s) Oral every 4 hours PRN Moderate Pain (4 - 6)  traMADol 100 milliGRAM(s) Oral every 6 hours PRN Severe Pain (7 - 10)        CAPILLARY BLOOD GLUCOSE        I&O's Summary    02 Mar 2019 07:01  -  03 Mar 2019 07:00  --------------------------------------------------------  IN: 2000 mL / OUT: 2420 mL / NET: -420 mL    03 Mar 2019 07:01  -  03 Mar 2019 22:49  --------------------------------------------------------  IN: 850 mL / OUT: 1900 mL / NET: -1050 mL        PHYSICAL EXAM:  GENERAL: NAD  NECK: Supple, No JVD  CHEST/LUNG: Clear to auscultation bilaterally; No wheezing.  HEART: Regular rate and rhythm; No murmurs, rubs, or gallops  ABDOMEN: Soft, Nontender, Nondistended; Bowel sounds present  EXTREMITIES:   No clubbing, cyanosis, or edema  NEUROLOGY: AAO X 3      LABS:                        11.1   17.08 )-----------( 271      ( 03 Mar 2019 09:49 )             35.7     03-03    142  |  102  |  14  ----------------------------<  115<H>  3.4<L>   |  26  |  0.51    Ca    9.0      03 Mar 2019 07:09              CAPILLARY BLOOD GLUCOSE                    RADIOLOGY & ADDITIONAL TESTS:    Imaging Personally Reviewed:    Consultant(s) Notes Reviewed:      Care Discussed with Consultants/Other Providers:

## 2019-03-03 NOTE — PROGRESS NOTE ADULT - ASSESSMENT
Impression: Stable       Plan:   Continue present treatment                 Out of bed, ambulate                  Physical therapy follow up                  Continue to monitor    Mika Barriga PA-C  Orthopaedic Surgery  Team pager [phone]  [phone]

## 2019-03-04 ENCOUNTER — TRANSCRIPTION ENCOUNTER (OUTPATIENT)
Age: 79
End: 2019-03-04

## 2019-03-04 VITALS
SYSTOLIC BLOOD PRESSURE: 124 MMHG | TEMPERATURE: 99 F | HEART RATE: 66 BPM | RESPIRATION RATE: 18 BRPM | OXYGEN SATURATION: 96 % | DIASTOLIC BLOOD PRESSURE: 74 MMHG

## 2019-03-04 PROBLEM — E78.5 HYPERLIPIDEMIA, UNSPECIFIED: Chronic | Status: ACTIVE | Noted: 2019-02-22

## 2019-03-04 PROBLEM — R76.8 OTHER SPECIFIED ABNORMAL IMMUNOLOGICAL FINDINGS IN SERUM: Chronic | Status: ACTIVE | Noted: 2019-02-22

## 2019-03-04 PROBLEM — R60.0 LOCALIZED EDEMA: Chronic | Status: ACTIVE | Noted: 2019-02-22

## 2019-03-04 PROBLEM — E03.9 HYPOTHYROIDISM, UNSPECIFIED: Chronic | Status: ACTIVE | Noted: 2019-02-22

## 2019-03-04 PROBLEM — M81.0 AGE-RELATED OSTEOPOROSIS WITHOUT CURRENT PATHOLOGICAL FRACTURE: Chronic | Status: ACTIVE | Noted: 2019-02-22

## 2019-03-04 LAB
ANION GAP SERPL CALC-SCNC: 16 MMOL/L — SIGNIFICANT CHANGE UP (ref 5–17)
BASOPHILS # BLD AUTO: 0.01 K/UL — SIGNIFICANT CHANGE UP (ref 0–0.2)
BASOPHILS NFR BLD AUTO: 0.1 % — SIGNIFICANT CHANGE UP (ref 0–2)
BUN SERPL-MCNC: 13 MG/DL — SIGNIFICANT CHANGE UP (ref 7–23)
CALCIUM SERPL-MCNC: 9.7 MG/DL — SIGNIFICANT CHANGE UP (ref 8.4–10.5)
CHLORIDE SERPL-SCNC: 98 MMOL/L — SIGNIFICANT CHANGE UP (ref 96–108)
CO2 SERPL-SCNC: 26 MMOL/L — SIGNIFICANT CHANGE UP (ref 22–31)
CREAT SERPL-MCNC: 0.53 MG/DL — SIGNIFICANT CHANGE UP (ref 0.5–1.3)
EOSINOPHIL # BLD AUTO: 0.03 K/UL — SIGNIFICANT CHANGE UP (ref 0–0.5)
EOSINOPHIL NFR BLD AUTO: 0.2 % — SIGNIFICANT CHANGE UP (ref 0–6)
GLUCOSE SERPL-MCNC: 99 MG/DL — SIGNIFICANT CHANGE UP (ref 70–99)
HCT VFR BLD CALC: 39.8 % — SIGNIFICANT CHANGE UP (ref 34.5–45)
HGB BLD-MCNC: 12.9 G/DL — SIGNIFICANT CHANGE UP (ref 11.5–15.5)
IMM GRANULOCYTES NFR BLD AUTO: 0.4 % — SIGNIFICANT CHANGE UP (ref 0–1.5)
LYMPHOCYTES # BLD AUTO: 14.3 % — SIGNIFICANT CHANGE UP (ref 13–44)
LYMPHOCYTES # BLD AUTO: 2.04 K/UL — SIGNIFICANT CHANGE UP (ref 1–3.3)
MCHC RBC-ENTMCNC: 30.5 PG — SIGNIFICANT CHANGE UP (ref 27–34)
MCHC RBC-ENTMCNC: 32.4 GM/DL — SIGNIFICANT CHANGE UP (ref 32–36)
MCV RBC AUTO: 94.1 FL — SIGNIFICANT CHANGE UP (ref 80–100)
MONOCYTES # BLD AUTO: 1.29 K/UL — HIGH (ref 0–0.9)
MONOCYTES NFR BLD AUTO: 9 % — SIGNIFICANT CHANGE UP (ref 2–14)
NEUTROPHILS # BLD AUTO: 10.85 K/UL — HIGH (ref 1.8–7.4)
NEUTROPHILS NFR BLD AUTO: 76 % — SIGNIFICANT CHANGE UP (ref 43–77)
PLATELET # BLD AUTO: 285 K/UL — SIGNIFICANT CHANGE UP (ref 150–400)
POTASSIUM SERPL-MCNC: 3.4 MMOL/L — LOW (ref 3.5–5.3)
POTASSIUM SERPL-SCNC: 3.4 MMOL/L — LOW (ref 3.5–5.3)
RBC # BLD: 4.23 M/UL — SIGNIFICANT CHANGE UP (ref 3.8–5.2)
RBC # FLD: 13.3 % — SIGNIFICANT CHANGE UP (ref 10.3–14.5)
SODIUM SERPL-SCNC: 140 MMOL/L — SIGNIFICANT CHANGE UP (ref 135–145)
WBC # BLD: 14.28 K/UL — HIGH (ref 3.8–10.5)
WBC # FLD AUTO: 14.28 K/UL — HIGH (ref 3.8–10.5)

## 2019-03-04 PROCEDURE — 80048 BASIC METABOLIC PNL TOTAL CA: CPT

## 2019-03-04 PROCEDURE — 76000 FLUOROSCOPY <1 HR PHYS/QHP: CPT

## 2019-03-04 PROCEDURE — 86900 BLOOD TYPING SEROLOGIC ABO: CPT

## 2019-03-04 PROCEDURE — 97161 PT EVAL LOW COMPLEX 20 MIN: CPT

## 2019-03-04 PROCEDURE — 97110 THERAPEUTIC EXERCISES: CPT

## 2019-03-04 PROCEDURE — 86901 BLOOD TYPING SEROLOGIC RH(D): CPT

## 2019-03-04 PROCEDURE — C1889: CPT

## 2019-03-04 PROCEDURE — 97116 GAIT TRAINING THERAPY: CPT

## 2019-03-04 PROCEDURE — 97530 THERAPEUTIC ACTIVITIES: CPT

## 2019-03-04 PROCEDURE — 85027 COMPLETE CBC AUTOMATED: CPT

## 2019-03-04 RX ORDER — ACETAMINOPHEN 500 MG
2 TABLET ORAL
Qty: 0 | Refills: 0 | COMMUNITY
Start: 2019-03-04

## 2019-03-04 RX ORDER — DOCUSATE SODIUM 100 MG
1 CAPSULE ORAL
Qty: 0 | Refills: 0 | COMMUNITY
Start: 2019-03-04

## 2019-03-04 RX ORDER — ALENDRONATE SODIUM 70 MG/1
1 TABLET ORAL
Qty: 0 | Refills: 0 | COMMUNITY

## 2019-03-04 RX ORDER — SODIUM CHLORIDE 9 MG/ML
500 INJECTION INTRAMUSCULAR; INTRAVENOUS; SUBCUTANEOUS ONCE
Qty: 0 | Refills: 0 | Status: COMPLETED | OUTPATIENT
Start: 2019-03-04 | End: 2019-03-04

## 2019-03-04 RX ORDER — ACETAMINOPHEN WITH CODEINE 300MG-30MG
1 TABLET ORAL
Qty: 0 | Refills: 0 | COMMUNITY

## 2019-03-04 RX ORDER — POTASSIUM CHLORIDE 20 MEQ
20 PACKET (EA) ORAL
Qty: 0 | Refills: 0 | Status: COMPLETED | OUTPATIENT
Start: 2019-03-04 | End: 2019-03-04

## 2019-03-04 RX ORDER — SENNA PLUS 8.6 MG/1
2 TABLET ORAL
Qty: 0 | Refills: 0 | COMMUNITY
Start: 2019-03-04

## 2019-03-04 RX ADMIN — Medication 100 MILLIGRAM(S): at 05:57

## 2019-03-04 RX ADMIN — Medication 1 MILLIGRAM(S): at 00:37

## 2019-03-04 RX ADMIN — CARBIDOPA AND LEVODOPA 1 TABLET(S): 25; 100 TABLET ORAL at 05:56

## 2019-03-04 RX ADMIN — Medication 1 TABLET(S): at 05:56

## 2019-03-04 RX ADMIN — Medication 1 MILLIGRAM(S): at 11:40

## 2019-03-04 RX ADMIN — AMLODIPINE BESYLATE 2.5 MILLIGRAM(S): 2.5 TABLET ORAL at 05:57

## 2019-03-04 RX ADMIN — Medication 1 TABLET(S): at 11:40

## 2019-03-04 RX ADMIN — Medication 20 MILLIEQUIVALENT(S): at 11:41

## 2019-03-04 RX ADMIN — FAMOTIDINE 20 MILLIGRAM(S): 10 INJECTION INTRAVENOUS at 11:40

## 2019-03-04 RX ADMIN — Medication 1 TABLET(S): at 13:11

## 2019-03-04 RX ADMIN — GABAPENTIN 200 MILLIGRAM(S): 400 CAPSULE ORAL at 05:57

## 2019-03-04 RX ADMIN — Medication 1 MILLIGRAM(S): at 05:56

## 2019-03-04 RX ADMIN — Medication 100 MICROGRAM(S): at 05:57

## 2019-03-04 RX ADMIN — SODIUM CHLORIDE 500 MILLILITER(S): 9 INJECTION INTRAMUSCULAR; INTRAVENOUS; SUBCUTANEOUS at 08:36

## 2019-03-04 RX ADMIN — Medication 500 MILLIGRAM(S): at 05:57

## 2019-03-04 RX ADMIN — Medication 20 MILLIEQUIVALENT(S): at 08:37

## 2019-03-04 RX ADMIN — CARBIDOPA AND LEVODOPA 1 TABLET(S): 25; 100 TABLET ORAL at 13:11

## 2019-03-04 NOTE — CHART NOTE - NSCHARTNOTEFT_GEN_A_CORE
Patient visited for drain pull. Suture d/c'd.  Hemostasis achieved, patient tolerated well, tip intact. 4x4 Dsg/tegaderm placed.    Chema Martinez PA-C  Team Pager: #[phone]

## 2019-03-04 NOTE — DISCHARGE NOTE ADULT - MEDICATION SUMMARY - MEDICATIONS TO TAKE
I will START or STAY ON the medications listed below when I get home from the hospital:    TLSO Brace  -- Dx: s/p L4-L5 PSIF  FLORIAN: 99 mos  -- Indication: For brace    acetaminophen 325 mg oral tablet  -- 2 tab(s) by mouth every 6 hours, As needed, Mild Pain (1 - 3)  -- Indication: For Pain/temp    oxyCODONE 5 mg oral tablet  -- 1 tab(s) by mouth every 6 hours, As Needed severe pain  -- Indication: For Pain    oxyCODONE 5 mg oral tablet  -- 0.5 tab(s) by mouth every 4 hours, As Needed for moderate pain  -- Indication: For Pain    gabapentin 100 mg oral capsule  -- 2 cap(s) by mouth 3 times a day  -- Indication: For Pain    atorvastatin 40 mg oral tablet  -- 1 tab(s) by mouth once a day (at bedtime)  -- Indication: For cholesterol    Azilect 1 mg oral tablet  -- 1 tab(s) by mouth once a day stooped  -- Indication: For Parkinson's disease    carbidopa-levodopa 25 mg-100 mg oral tablet  -- 1 tab(s) by mouth 3 times a day  -- Indication: For Parkinson's disease    amLODIPine 2.5 mg oral tablet  -- 1 tab(s) by mouth once a day  -- Indication: For blood pressure    lactulose 10 g/15 mL oral syrup  -- 15 milliliter(s) by mouth 2 times a day  -- Indication: For Laxative    senna oral tablet  -- 2 tab(s) by mouth once a day (at bedtime)  -- Indication: For laxative    docusate sodium 100 mg oral capsule  -- 1 cap(s) by mouth 3 times a day  -- Indication: For Stool softener    calcium (as calcium citrate) 500 mg oral tablet, effervescent  -- 1 tab(s) by mouth once a day  -- Indication: For Supplement    Melatonin 10 mg oral tablet, disintegrating  -- 1 tab(s) by mouth once a day (at bedtime)  -- Indication: For Sleep aid    Levoxyl 100 mcg (0.1 mg) oral tablet  -- 1 tab(s) by mouth 6 .5 times a week  -- Indication: For Hypothyroidism    calcium-vitamin D 500 mg-200 intl units oral tablet  -- 1 tab(s) by mouth 3 times a day  -- Indication: For Supplement    Multiple Vitamins oral tablet  -- 1 tab(s) by mouth once a day  -- Indication: For Supplement

## 2019-03-04 NOTE — DISCHARGE NOTE ADULT - NS AS ACTIVITY OBS
Showering allowed/Walking-Outdoors allowed/Walking-Indoors allowed/stairs/shower with assistance/Stairs allowed/Do not make important decisions/Do not drive or operate machinery/No Heavy lifting/straining

## 2019-03-04 NOTE — DISCHARGE NOTE ADULT - PATIENT PORTAL LINK FT
You can access the [facility] Patient Portal, offered by [facility], by registering with the following website: [email or web address]

## 2019-03-04 NOTE — DISCHARGE NOTE ADULT - MEDICATION SUMMARY - MEDICATIONS TO STOP TAKING
I will STOP taking the medications listed below when I get home from the hospital:    alendronate 70 mg oral tablet  -- 1 tab(s) by mouth once a week

## 2019-03-04 NOTE — DISCHARGE NOTE ADULT - PLAN OF CARE
improved ADL's, pain control Please follow up with Dr. Patricia after discharge from Rehab (-approx 2 weeks).  Keep dressing clean, dry and intact.  PT-weight bearing as tolerated with brace for support, Don/doff brace while sitting for ambulatory support.  Please follow up with your PMD within 1 month of discharge for routine follow up.

## 2019-03-04 NOTE — DISCHARGE NOTE ADULT - CARE PROVIDER_API CALL
Tee Patricia)  Orthopedics  [address]  [place] [address]  Phone: [phone]  Fax: [phone]  Follow Up Time:

## 2019-03-04 NOTE — PROGRESS NOTE ADULT - SUBJECTIVE AND OBJECTIVE BOX
Patient is a 78y old  Female who presents with a chief complaint of low back pain/ PSF L4-L5   POST OPERATIVE DAY #:  [3 ]   Patient comfortable  No complaints    T(C): 37.2 (03-04-19 @ 05:06), Max: 37.2 (03-04-19 @ 05:06)  HR: 82 (03-04-19 @ 05:06) (69 - 86)  BP: 165/87 (03-04-19 @ 05:06) (110/67 - 170/79)  RR: 18 (03-04-19 @ 05:06) (18 - 18)  SpO2: 95% (03-04-19 @ 05:06) (93% - 96%)    PHYSICAL EXAM:  NAD, Alert  Back: Dressing C/D/I; sensation grossly intact to light touch; (+) Distal Pulses; No Calf tenderness B/L, PAS              [ ] Lower extremeity                  PF          DF         EHL       FHL                                                                                            R        4/5        4/5        4/5       4/5                                                        L         4/5        4/5        4/5       4/5      LABS:             11.1   17.08 )-----------( 271      ( 03 Mar 2019 09:49 )             35.7     03-03    142  |  102  |  14  ----------------------------<  115<H>  3.4<L>   |  26  |  0.51    Ca    9.0      03 Mar 2019 07:09          RADIOLOGY & ADDITIONAL TESTS:

## 2019-03-04 NOTE — PROGRESS NOTE ADULT - ASSESSMENT
79 y/o fm s/p L4-5 PSF POD#3, physical therapy, rehab  Rachana Jeter PA-C  Orthopaedic Surgery  Team pager [phone]  [facility] [phone]  [phone]

## 2019-03-04 NOTE — DISCHARGE NOTE ADULT - HOSPITAL COURSE
This is a 78 year old female admitted to [facility] on 3/1/19 for elective spine surgery.  Patient underwent an uncomplicated procedure.  Evaluated and treated by PT, recommended for subacute rehab.  Remain of hospital stay unremarkable, and patient discharged to rehab when bed available.

## 2019-03-04 NOTE — DISCHARGE NOTE ADULT - CARE PLAN
Principal Discharge DX:	Spondylolisthesis of lumbar region  Goal:	improved ADL's, pain control  Assessment and plan of treatment:	Please follow up with Dr. Patricia after discharge from Rehab (-approx 2 weeks).  Keep dressing clean, dry and intact.  PT-weight bearing as tolerated with brace for support, Don/doff brace while sitting for ambulatory support.  Please follow up with your PMD within 1 month of discharge for routine follow up.

## 2019-03-06 ENCOUNTER — RX RENEWAL (OUTPATIENT)
Age: 79
End: 2019-03-06

## 2019-03-07 NOTE — PHYSICAL EXAM
[de-identified] : NTTP L spine and b/l paraspinals lumbar region\par Skin intact L spine. No rashes, ulcers, blisters. \par No lymphedema. \par LLE external rotation: produces pain\par  [de-identified] : EXAM: MR SPINE LUMBAR \par \par \par PROCEDURE DATE: 11/11/2018 \par \par \par \par INTERPRETATION: MRI OF THE LUMBAR SPINE \par \par CLINICAL INDICATION: Left buttocks and lower extremity pain for 5 weeks. \par Evaluate for herniation. \par \par TECHNIQUE: Magnetic resonance imaging of the lumbar spine was performed \par utilizing sagittal T1, T2, and inversion recovery sequences as well as axial \par T2 coronal T1-weighted sequences. \par \par COMPARISON: MR lumbar spine 7/12/2015. \par \par FINDINGS: \par \par The conus medullaris terminates at the L1-L2 level and is unremarkable in \par appearance. Vertebral body heights are preserved. There is grade 1 \par anterolisthesis of L4 on L5, new from the prior study. There is no marrow \par infiltration. Multilevel disc degeneration is noted throughout the lumbar \par spine, severe disc height loss at L4-L5, increased from the prior study. \par There are degenerative endplate changes at L2-L3 through L4-L5, increased at \par the L4-L5 level. \par \par T11-T12: Evaluated only in the sagittal plane. No central canal narrowing. \par \par T12-L1: Evaluate only the sagittal plane. No central or neural foraminal \par narrowing. \par \par L1-L2: Mild lateral facet and ligamentous productive change. No central \par canal or neural foraminal stenosis. Unchanged \par \par L2-L3: Small diffuse disc bulge with ligamentum flavum thickening. Mild \par central canal stenosis and mild bilateral foraminal stenosis, unchanged. \par \par L3-L4: Moderate bilateral facet arthropathy with small left facet joint \par effusion. Mild to moderate ligamentous productive change. Small diffuse disc \par bulge with ligamentum flavum thickening. Constellation of findings results \par in mild central canal stenosis, slightly increased from the prior study. \par Mild bilateral foraminal stenosis, unchanged. \par \par L4-L5: Grade 1 anterolisthesis. Diffuse disc bulge with annular fissure and \par superimposed central/paracentral disc protrusion. The right \par paracentral/lateral recess disc protrusion has improved. This results in \par moderate bilateral lateral recess stenosis and mild central stenosis. There \par is posterior displacement and compression of the bilateral descending nerve \par roots, which is new on the left as compared to the prior study. There is \par mild to moderate bilateral foraminal stenosis. Mild bilateral facet \par degeneration is noted. \par \par L5-S1: Small diffuse disc bulge. Mild to moderate bilateral facet productive \par changes small bilateral facet effusions. No central canal stenosis. Mild \par bilateral foraminal stenosis. Lateral recesses are relatively preserved. \par \par IMPRESSION: \par \par Multilevel degenerative disc disease, most pronounced at L4-L5, increased \par from the prior study. \par At L4-L5, new anterolisthesis and uncovering/disc bulge with superimposed \par disc protrusion results in compression of the bilateral descending nerve \par roots, new on the left as compared to 2015. \par \par \par \par \par \par \par SADA RODARTE M.D., RADIOLOGY RESIDENT \par This document has been electronically signed. \par STARLA VILLALBA M.D., ATTENDING RADIOLOGIST \par This document has been electronically signed. Nov 13 2018 3:10PM \par \par

## 2019-03-07 NOTE — DISCUSSION/SUMMARY
[de-identified] : 79 yo female with spinal stenosis, herniated disc and spondylolisthesis, failed PT, NSAIDS, and ELE, impacting ADL's and quality of life, recommend Laminectomy and spinal fusion with instrumentation with PLIF L45 due to her parkinsons.\par \par I reviewed all major risks, benefits, and complications associated with surgical intervention including but not limited to bleeding, infection, neurological injury, CSF leak, implant failure, implant migration, pedicle screw breech, pseudarthrosis, adjacent segment degeneration, hematoma, anesthetic risk, chronic pain and disability , medical complications (GI, , DVT, PE, cardiac, pulmonary, etc) and risk of mortality.

## 2019-03-07 NOTE — HISTORY OF PRESENT ILLNESS
[de-identified] : Pt with spinal stenosis, herniated disc and spondylolisthesis, failed PT, NSAIDS, and ELE, impacting ADL's and quality of life, Presents today to discuss surgical intervention. Pain radiates to left buttocks, left groin and anterior aspect of LLE to ankle, Numbness on sole of left foot and LLE weakness. She denies tingling to B/L LE. Pt takes Tylenol ES and Gabapentin 100mg TID, this provides mild relief in pain. Pt had LESI X2 performed by Dr Navarro, this provided no relief in pain. Pt uses walker due to weakness, she was using a cane prior to this. Pt participates with PT for Parkinson ( boxing for Parkinson). [Acupuncture] : not relieved by acupuncture [Recumbency] : not relieved by recumbency [Incontinence] : no incontinence [Urinary Ret.] : no urinary retention [de-identified] : rising from chair exacerbates pain

## 2019-03-18 ENCOUNTER — APPOINTMENT (OUTPATIENT)
Dept: ORTHOPEDIC SURGERY | Facility: CLINIC | Age: 79
End: 2019-03-18
Payer: MEDICARE

## 2019-03-18 PROCEDURE — 99024 POSTOP FOLLOW-UP VISIT: CPT

## 2019-03-18 PROCEDURE — 72100 X-RAY EXAM L-S SPINE 2/3 VWS: CPT

## 2019-03-18 NOTE — HISTORY OF PRESENT ILLNESS
[___ Days Post Op] : post op day #[unfilled] [Xray (Date:___)] : [unfilled] Xray -  [Doing Well] : is doing well [No ADLs] : to avoid most activities of daily living [Excellent Pain Control] : has excellent pain control [No Sign of Infection] : is showing no signs of infection [No Work] : not to work [No Housework] : not to do housework [de-identified] : s/p posterior lumbar laminectomy L3-L5  and fusion L4-L5 on 03/01/19 [de-identified] : Brace , PT, RTO 4 weeks. Left hip and thigh pain is from OA of left hip.  [de-identified] : Patient is doing ok overall, she is taking Tylenol 2 tabs/day, she is no longer taking percocet. Preop pain improved.  She is able to walk but only with a walker. She has pain when walking Pain is 5/10.  She does feel weak. She has pain in the left leg denies any numbness or tingling.  She has been wearing the lumbar support.

## 2019-03-30 ENCOUNTER — RX RENEWAL (OUTPATIENT)
Age: 79
End: 2019-03-30

## 2019-04-01 ENCOUNTER — RX RENEWAL (OUTPATIENT)
Age: 79
End: 2019-04-01

## 2019-04-09 ENCOUNTER — APPOINTMENT (OUTPATIENT)
Dept: ORTHOPEDIC SURGERY | Facility: CLINIC | Age: 79
End: 2019-04-09
Payer: MEDICARE

## 2019-04-09 VITALS — HEART RATE: 90 BPM | DIASTOLIC BLOOD PRESSURE: 82 MMHG | SYSTOLIC BLOOD PRESSURE: 174 MMHG

## 2019-04-09 PROCEDURE — 99024 POSTOP FOLLOW-UP VISIT: CPT

## 2019-04-09 PROCEDURE — 72100 X-RAY EXAM L-S SPINE 2/3 VWS: CPT

## 2019-04-09 NOTE — HISTORY OF PRESENT ILLNESS
[Doing Well] : is doing well [Excellent Pain Control] : has excellent pain control [No Sign of Infection] : is showing no signs of infection [No ADLs] : to avoid most activities of daily living [No Work] : not to work [No Housework] : not to do housework [___ Months Post Op] : [unfilled] months post op [Xray (Date:___)] : [unfilled] Xray -  [Clean/Dry/Intact] : clean, dry and intact [Bony Fusion] : bony fusion [Swelling] : not swollen [de-identified] : s/p posterior lumbar laminectomy L3-L5  and fusion L4-L5 on 03/01/19 [de-identified] : Patient is doing ok overall, Pt is not taking meds for pain. Preop pain improved.  She is able to walk but only with a walker. She has pain on LLE when walking Pain is 5/10.  She does feel weak. She denies any numbness or tingling on B/L E.  She has been wearing the lumbar support.  [de-identified] : Brace , PT, home care,  RTO 4 weeks. Left hip and thigh pain is from OA of left hip.

## 2019-04-18 ENCOUNTER — APPOINTMENT (OUTPATIENT)
Dept: ORTHOPEDIC SURGERY | Facility: CLINIC | Age: 79
End: 2019-04-18
Payer: MEDICARE

## 2019-04-18 PROCEDURE — 73502 X-RAY EXAM HIP UNI 2-3 VIEWS: CPT | Mod: LT

## 2019-04-18 PROCEDURE — 99214 OFFICE O/P EST MOD 30 MIN: CPT | Mod: 24

## 2019-04-19 LAB
BASOPHILS # BLD AUTO: 0.08 K/UL
BASOPHILS NFR BLD AUTO: 0.5 %
CRP SERPL-MCNC: 0.58 MG/DL
EOSINOPHIL # BLD AUTO: 0.08 K/UL
EOSINOPHIL NFR BLD AUTO: 0.5 %
ERYTHROCYTE [SEDIMENTATION RATE] IN BLOOD BY WESTERGREN METHOD: 31 MM/HR
HCT VFR BLD CALC: 43.7 %
HGB BLD-MCNC: 13.2 G/DL
IMM GRANULOCYTES NFR BLD AUTO: 0.3 %
LYMPHOCYTES # BLD AUTO: 1.53 K/UL
LYMPHOCYTES NFR BLD AUTO: 10.4 %
MAN DIFF?: NORMAL
MCHC RBC-ENTMCNC: 29.9 PG
MCHC RBC-ENTMCNC: 30.2 GM/DL
MCV RBC AUTO: 99.1 FL
MONOCYTES # BLD AUTO: 1.11 K/UL
MONOCYTES NFR BLD AUTO: 7.6 %
NEUTROPHILS # BLD AUTO: 11.82 K/UL
NEUTROPHILS NFR BLD AUTO: 80.7 %
PLATELET # BLD AUTO: 257 K/UL
RBC # BLD: 4.41 M/UL
RBC # FLD: 14.5 %
WBC # FLD AUTO: 14.66 K/UL

## 2019-04-25 ENCOUNTER — FORM ENCOUNTER (OUTPATIENT)
Age: 79
End: 2019-04-25

## 2019-04-26 ENCOUNTER — APPOINTMENT (OUTPATIENT)
Dept: MRI IMAGING | Facility: CLINIC | Age: 79
End: 2019-04-26
Payer: MEDICARE

## 2019-04-26 ENCOUNTER — OUTPATIENT (OUTPATIENT)
Dept: OUTPATIENT SERVICES | Facility: HOSPITAL | Age: 79
LOS: 1 days | End: 2019-04-26
Payer: MEDICARE

## 2019-04-26 ENCOUNTER — APPOINTMENT (OUTPATIENT)
Dept: RADIOLOGY | Facility: CLINIC | Age: 79
End: 2019-04-26
Payer: MEDICARE

## 2019-04-26 DIAGNOSIS — Z90.89 ACQUIRED ABSENCE OF OTHER ORGANS: Chronic | ICD-10-CM

## 2019-04-26 DIAGNOSIS — M89.50 OSTEOLYSIS, UNSPECIFIED SITE: ICD-10-CM

## 2019-04-26 DIAGNOSIS — Z98.890 OTHER SPECIFIED POSTPROCEDURAL STATES: Chronic | ICD-10-CM

## 2019-04-26 DIAGNOSIS — E89.0 POSTPROCEDURAL HYPOTHYROIDISM: Chronic | ICD-10-CM

## 2019-04-26 DIAGNOSIS — M16.12 UNILATERAL PRIMARY OSTEOARTHRITIS, LEFT HIP: ICD-10-CM

## 2019-04-26 PROCEDURE — 77002 NEEDLE LOCALIZATION BY XRAY: CPT

## 2019-04-26 PROCEDURE — 73721 MRI JNT OF LWR EXTRE W/O DYE: CPT | Mod: 26,LT

## 2019-04-26 PROCEDURE — 20610 DRAIN/INJ JOINT/BURSA W/O US: CPT | Mod: LT

## 2019-04-26 PROCEDURE — 77002 NEEDLE LOCALIZATION BY XRAY: CPT | Mod: 26

## 2019-04-26 PROCEDURE — 73721 MRI JNT OF LWR EXTRE W/O DYE: CPT

## 2019-04-26 PROCEDURE — 20610 DRAIN/INJ JOINT/BURSA W/O US: CPT

## 2019-04-30 ENCOUNTER — OUTPATIENT (OUTPATIENT)
Dept: OUTPATIENT SERVICES | Facility: HOSPITAL | Age: 79
LOS: 1 days | End: 2019-04-30
Payer: MEDICARE

## 2019-04-30 VITALS
RESPIRATION RATE: 16 BRPM | HEIGHT: 58 IN | HEART RATE: 75 BPM | WEIGHT: 115.08 LBS | DIASTOLIC BLOOD PRESSURE: 70 MMHG | OXYGEN SATURATION: 97 % | SYSTOLIC BLOOD PRESSURE: 104 MMHG | TEMPERATURE: 98 F

## 2019-04-30 DIAGNOSIS — Z98.890 OTHER SPECIFIED POSTPROCEDURAL STATES: Chronic | ICD-10-CM

## 2019-04-30 DIAGNOSIS — Z90.89 ACQUIRED ABSENCE OF OTHER ORGANS: Chronic | ICD-10-CM

## 2019-04-30 DIAGNOSIS — M16.12 UNILATERAL PRIMARY OSTEOARTHRITIS, LEFT HIP: ICD-10-CM

## 2019-04-30 DIAGNOSIS — E03.9 HYPOTHYROIDISM, UNSPECIFIED: ICD-10-CM

## 2019-04-30 DIAGNOSIS — G20 PARKINSON'S DISEASE: ICD-10-CM

## 2019-04-30 DIAGNOSIS — E89.0 POSTPROCEDURAL HYPOTHYROIDISM: Chronic | ICD-10-CM

## 2019-04-30 LAB
ANION GAP SERPL CALC-SCNC: 16 MMO/L — HIGH (ref 7–14)
APPEARANCE UR: CLEAR — SIGNIFICANT CHANGE UP
BACTERIA # UR AUTO: SIGNIFICANT CHANGE UP
BILIRUB UR-MCNC: NEGATIVE — SIGNIFICANT CHANGE UP
BLD GP AB SCN SERPL QL: NEGATIVE — SIGNIFICANT CHANGE UP
BLOOD UR QL VISUAL: SIGNIFICANT CHANGE UP
BUN SERPL-MCNC: 27 MG/DL — HIGH (ref 7–23)
CALCIUM SERPL-MCNC: 9.9 MG/DL — SIGNIFICANT CHANGE UP (ref 8.4–10.5)
CHLORIDE SERPL-SCNC: 99 MMOL/L — SIGNIFICANT CHANGE UP (ref 98–107)
CO2 SERPL-SCNC: 26 MMOL/L — SIGNIFICANT CHANGE UP (ref 22–31)
COD CRY URNS QL: SIGNIFICANT CHANGE UP (ref 0–0)
COLOR SPEC: YELLOW — SIGNIFICANT CHANGE UP
CREAT SERPL-MCNC: 0.69 MG/DL — SIGNIFICANT CHANGE UP (ref 0.5–1.3)
GLUCOSE SERPL-MCNC: 68 MG/DL — LOW (ref 70–99)
GLUCOSE UR-MCNC: NEGATIVE — SIGNIFICANT CHANGE UP
HBA1C BLD-MCNC: 5.7 % — HIGH (ref 4–5.6)
HCT VFR BLD CALC: 42.4 % — SIGNIFICANT CHANGE UP (ref 34.5–45)
HGB BLD-MCNC: 13.2 G/DL — SIGNIFICANT CHANGE UP (ref 11.5–15.5)
HYALINE CASTS # UR AUTO: HIGH
KETONES UR-MCNC: SIGNIFICANT CHANGE UP
LEUKOCYTE ESTERASE UR-ACNC: NEGATIVE — SIGNIFICANT CHANGE UP
MCHC RBC-ENTMCNC: 29.4 PG — SIGNIFICANT CHANGE UP (ref 27–34)
MCHC RBC-ENTMCNC: 31.1 % — LOW (ref 32–36)
MCV RBC AUTO: 94.4 FL — SIGNIFICANT CHANGE UP (ref 80–100)
MUCOUS THREADS # UR AUTO: SIGNIFICANT CHANGE UP
NITRITE UR-MCNC: NEGATIVE — SIGNIFICANT CHANGE UP
NRBC # FLD: 0 K/UL — SIGNIFICANT CHANGE UP (ref 0–0)
PH UR: 6 — SIGNIFICANT CHANGE UP (ref 5–8)
PLATELET # BLD AUTO: 283 K/UL — SIGNIFICANT CHANGE UP (ref 150–400)
PMV BLD: 10.6 FL — SIGNIFICANT CHANGE UP (ref 7–13)
POTASSIUM SERPL-MCNC: 4.3 MMOL/L — SIGNIFICANT CHANGE UP (ref 3.5–5.3)
POTASSIUM SERPL-SCNC: 4.3 MMOL/L — SIGNIFICANT CHANGE UP (ref 3.5–5.3)
PROT UR-MCNC: 20 — SIGNIFICANT CHANGE UP
RBC # BLD: 4.49 M/UL — SIGNIFICANT CHANGE UP (ref 3.8–5.2)
RBC # FLD: 14.1 % — SIGNIFICANT CHANGE UP (ref 10.3–14.5)
RBC CASTS # UR COMP ASSIST: SIGNIFICANT CHANGE UP (ref 0–?)
RH IG SCN BLD-IMP: POSITIVE — SIGNIFICANT CHANGE UP
SODIUM SERPL-SCNC: 141 MMOL/L — SIGNIFICANT CHANGE UP (ref 135–145)
SP GR SPEC: 1.03 — SIGNIFICANT CHANGE UP (ref 1–1.04)
SQUAMOUS # UR AUTO: SIGNIFICANT CHANGE UP
UROBILINOGEN FLD QL: NORMAL — SIGNIFICANT CHANGE UP
WBC # BLD: 7.68 K/UL — SIGNIFICANT CHANGE UP (ref 3.8–10.5)
WBC # FLD AUTO: 7.68 K/UL — SIGNIFICANT CHANGE UP (ref 3.8–10.5)
WBC UR QL: HIGH (ref 0–?)

## 2019-04-30 PROCEDURE — 93010 ELECTROCARDIOGRAM REPORT: CPT

## 2019-04-30 RX ORDER — AMLODIPINE BESYLATE 2.5 MG/1
1 TABLET ORAL
Qty: 0 | Refills: 0 | COMMUNITY

## 2019-04-30 RX ORDER — GABAPENTIN 400 MG/1
2 CAPSULE ORAL
Qty: 0 | Refills: 0 | COMMUNITY

## 2019-04-30 RX ORDER — LANOLIN ALCOHOL/MO/W.PET/CERES
1 CREAM (GRAM) TOPICAL
Qty: 0 | Refills: 0 | COMMUNITY

## 2019-04-30 RX ORDER — OXYCODONE HYDROCHLORIDE 5 MG/1
1 TABLET ORAL
Qty: 0 | Refills: 0 | COMMUNITY

## 2019-04-30 RX ORDER — LEVOTHYROXINE SODIUM 125 MCG
1 TABLET ORAL
Qty: 0 | Refills: 0 | COMMUNITY

## 2019-04-30 RX ORDER — OXYCODONE HYDROCHLORIDE 5 MG/1
0.5 TABLET ORAL
Qty: 0 | Refills: 0 | COMMUNITY

## 2019-04-30 RX ORDER — RASAGILINE 0.5 MG/1
1 TABLET ORAL
Qty: 0 | Refills: 0 | COMMUNITY

## 2019-04-30 NOTE — H&P PST ADULT - NEGATIVE GENERAL GENITOURINARY SYMPTOMS
no flank pain L/no renal colic/no flank pain R/normal urinary frequency/no hematuria/no dysuria/no urinary hesitancy

## 2019-04-30 NOTE — DISCUSSION/SUMMARY
[de-identified] : Acute osteolysis left femoral head, recent spinal surgery. \par The patient was informed of the findings and recommended further workup of her left hip.  I have recommended an MRI of the left hip to rule out further osteolysis of the proximal femur.  I have ordered an infection laboratory workup, inclusive of CBC with diff, ESR and CRP to rule out infectious cause and rule out infection. I have also recommended that she have an intraarticular aspiration of the left hip to evaluate joint fluid for cell count, gram stain as well as culture. \par In view of lack of adequate pain relief with conservative (non-surgical) management as well as complete osteolysis of the femoral head, the patient is recommended to consider a LEFT Total Hip Replacement. \par The risks, benefits, alternatives, implications, complications including but not limited to pain, stiffness, bleeding, limp, wound breakdown, infection, limb length discrepancy, dislocation, bone fracture, nerve and vascular compromise, implant wear and durability issues and rehabilitation were discussed and relevant questions were addressed. The possibility of recurrent pain, no improvement in pain and actual worsening of the pain were also mentioned in conversation with the patient. Medical complications related to the patient's general medical health including deep vein thrombosis, pulmonary embolus, heart attack, stroke, death and other complications from anesthesia were discussed as well. The patient wishes to proceed and will undergo preoperative medical evaluation and clearance, attend the preoperative educational class and will schedule surgery appropriately.\par Anticoagulation prophylaxis medication options to address risks of deep vein thrombosis and pulmonary embolism were discussed and weighed against the risks of bleeding and wound healing complications. The patient elected aspirin/coumadin prophylaxis with mechanical modalities.\par The patient has been recommended to follow up following the lab workup to discuss the plan further. I have advised the patient that if there is an infectious cause, she will likely need a two stage procedure. She is aware and will follow up once all workup has been completed.

## 2019-04-30 NOTE — H&P PST ADULT - NSICDXFAMILYHX_GEN_ALL_CORE_FT
FAMILY HISTORY:  Family history of cerebral hemorrhage, mother  FH: heart disease, sister  FH: kidney cancer, father  FH: lung cancer, sister

## 2019-04-30 NOTE — H&P PST ADULT - MUSCULOSKELETAL
details… detailed exam decreased ROM due to pain decreased ROM due to pain/no calf tenderness/decreased ROM/LLE, left hip

## 2019-04-30 NOTE — H&P PST ADULT - NSICDXPROBLEM_GEN_ALL_CORE_FT
PROBLEM DIAGNOSES  Problem: Unilateral primary osteoarthritis, left hip  Assessment and Plan: Scheduled for left total hip replacement direct anterior approach on 5/1/5/2019.  labs done and results pending. Surgical scrub & preop instruction given and explained. Famotidine provided with instruction. Verbalized understanding.       Problem: Hypothyroidism  Assessment and Plan: Instructed to take Levoxyl AM of surgery with a sip of water.     Problem: Parkinsons  Assessment and Plan: Instructed to take carbidopa/levodopa, azilect AM of surgery with a sip of water. PROBLEM DIAGNOSES  Problem: Unilateral primary osteoarthritis, left hip  Assessment and Plan: Scheduled for left total hip replacement direct anterior approach on 5/1/5/2019.  labs done and results pending. Surgical scrub & preop instruction given and explained. Famotidine provided with instruction. Verbalized understanding.   Medical evaluation requested by surgeon. Will obtain for PST chart.       Problem: Hypothyroidism  Assessment and Plan: Instructed to take Levoxyl AM of surgery with a sip of water.     Problem: Parkinsons  Assessment and Plan: Instructed to take carbidopa/levodopa, Azilect AM of surgery with a sip of water.

## 2019-04-30 NOTE — H&P PST ADULT - NSICDXPASTMEDICALHX_GEN_ALL_CORE_FT
PAST MEDICAL HISTORY:  MARK positive     Edema of lower extremity seen by PMD doppler done -as per patient normal    Essential hypertension     H/O Graves' disease     HLD (hyperlipidemia)     Hypothyroidism     MVP (mitral valve prolapse) -no cardiology    Osteoporosis     Parkinson's disease dx 2012    Spondylolisthesis of lumbar region s/p lumbar laminectomy with fusion in 3/2019

## 2019-04-30 NOTE — CONSULT LETTER
[Dear  ___] : Dear  [unfilled], [Consult Letter:] : I had the pleasure of evaluating your patient, [unfilled]. [Please see my note below.] : Please see my note below. [Consult Closing:] : Thank you very much for allowing me to participate in the care of this patient.  If you have any questions, please do not hesitate to contact me. [Sincerely,] : Sincerely, [FreeTextEntry2] : LORI HOPPER\par  [FreeTextEntry3] : Bran Yu MD\par \par ______________________________________________\par [facility]: Hip/Knee Arthroplasty\par [address], [address], [place] [address]\par (t) [phone]\par (f) [phone]\par

## 2019-04-30 NOTE — PHYSICAL EXAM
[Antalgic] : antalgic [LE] : Sensory: Intact in bilateral lower extremities [Knee] : patellar 2+ and symmetric bilaterally [Ankle] : ankle 2+ and symmetric bilaterally [DP] : dorsalis pedis 2+ and symmetric bilaterally [PT] : posterior tibial 2+ and symmetric bilaterally [de-identified] : On general examination the patient is adequately groomed and nourished. The vital parameters are as recorded. \par There is no lymphedema or diffuse swelling, no varicose veins, no skin warmth/erythema/scars/swelling, no ulcers and no palpable lymph nodes or masses in both lower extremities. Bilateral pedal pulses are well palpable.\par Upper Extremity:\par Both right and left upper extremities are unremarkable in terms of skin rash, lesions, pigmentation, redness, tenderness, swelling, joint instability, abnormal deformity or crepitus. The overall range of motion, sensation, motor tone and strength testing are normal.\par \par Hip Exam:\par The gait is left stiff hip coxalgic.\par The patient has unequal leg lengths - left lower limb shortening of  1 in and no pelvic tilt. \par Boo/Larissa test is 6 inches on the right and 12 inches on the left. \par Active SLR is 60 degrees on the right and 40 degrees on the left. Both hips demonstrate no scars and the skin has no signs of inflammation or tenderness. \par Both Hips have a range of motion that is symmetrical in flexion and extension of:\par Hip flexion:            Right 100 degrees               Left 60 degrees\par Hip abduction:      Right 40 degrees                  Left 20 degrees\par Hip adduction:      Right 20 degrees                  Left  0 degrees\par Internal rotation:      Right 20 degrees                 Left 0 degrees\par External rotation:     Right 40 degrees                 Left 20 degrees\par There is mild flexion contracture, deformity or instability. \par Labral impingement tests are negative.\par Right hip flexor, abductor and extensor power is grade 5.\par Left hip flexor, abductor and extensor power is grade 4+.\par \par Spine Exam:\par There is mild curvature of the spine with loss of normal lumbar lordosis. The skin is devoid of erythema, pigmentation or rashes. There is a well healed scar from lumbar spine surgery.  There is mild lumbar spasm and tenderness especially at L4-L5 or L5-S1. \par The range of motion of the lumbar spine is limited by pain and spasm. Forward flexion is 80% normal, extension catch positive, lateral flexion and rotation 80% normal. There is no lumbar spine imbalance or instability detected.\par Bilateral passive SLR is right 40 degrees, left 40 degrees in supine and sitting positions. Lasegue's Test is negative.\par Neurology:\par The patient is alert and oriented in person, place and time. The mood is calm and affect is normal.\par Testing for coordination including Rhomberg's Test and Finger-Nose Test, sensation, motor tone and power and deep tendon reflexes in both lower extremities is normal.\par  [de-identified] : The following radiographs were ordered and read by me during this patients visit. I reviewed each radiograph in detail with the patient and discussed the findings as highlighted below. \par AP and false profile views of the left hip and AP view of the pelvis confirm acute osteolysis of the femoral head, with shortening of the left LE. \par

## 2019-04-30 NOTE — H&P PST ADULT - HISTORY OF PRESENT ILLNESS
77 yo female with PMH of HTN, HLD, Hypothyroidism parkinson's.  Pt has been experiencing low back pain , radiating to the left hip, groin pain, impacting ADL's and quality of life,  followed by pain/neurology dx spinal stenosis, herniated disc and spondylolisthesis, failed PT, NSAIDS, and ELE, impacting ADL's and quality of life. Presents to PST for scheduled  Posterior Lumbar Laminectomy  & Instrumented  Spinal Fusion with Interbody on 3/1/2019.    80 yo female with hx of HTN, HLD, hypothyroidism, parkinson's presents to have PST evaluation with preop dx of unilateral primary osteoarthritis, left hip. Patient reports, hx of left hip nerve pain in 8/2019, had multiple steroid treatments with some relief. Patient states, hip pain worsened in 12/2018, went for an evaluation, had x-ray & MRI of hip done (4/2019), surgical intervention was recommended, now scheduled for left total hip replacement direct anterior approach on 5/15/2019.    Patient is s/p lumbar spinal surgery in 3/2019 with Dr. Patricia. 78 yo female with hx of HTN, HLD, hypothyroidism, parkinson's presents to have PST evaluation with preop dx of unilateral primary osteoarthritis, left hip. Patient reports, hx of left hip nerve pain in 8/2019, had multiple steroid treatments with some relief. Patient states, hip pain worsened in 12/2018, went for an evaluation, had x-ray & MRI of hip done (4/2019), surgical intervention was recommended, now scheduled for left total hip replacement direct anterior approach on 5/15/2019.   Patient is s/p lumbar spinal surgery in 3/2019 with Dr. Patricia.

## 2019-04-30 NOTE — H&P PST ADULT - NEGATIVE ENMT SYMPTOMS
no ear pain/no nasal obstruction/no post-nasal discharge/no dysphagia/no dry mouth/no tinnitus/no nasal discharge/no hearing difficulty/no throat pain/no nasal congestion

## 2019-04-30 NOTE — H&P PST ADULT - MUSCULOSKELETAL COMMENTS
ambulates with a walker ambulates with a walker / pt states, noticed difference in length of legs since spinal surgery. preop dx of unilateral primary osteoarthritis, left hip

## 2019-04-30 NOTE — H&P PST ADULT - NSICDXPASTSURGICALHX_GEN_ALL_CORE_FT
PAST SURGICAL HISTORY:  H/O colonoscopy 2018    History of shoulder surgery Right- long time ago    History of thyroidectomy, total 1992    History of tonsillectomy childhood    S/P lumbar laminectomy with fusion in 3/2019

## 2019-04-30 NOTE — H&P PST ADULT - RS GEN PE MLT RESP DETAILS PC
clear to auscultation bilaterally/respirations non-labored/breath sounds equal/airway patent/good air movement/normal respirations non-labored/no rales/breath sounds equal/good air movement/no rhonchi/clear to auscultation bilaterally/no wheezes/normal/airway patent

## 2019-05-01 ENCOUNTER — MEDICATION RENEWAL (OUTPATIENT)
Age: 79
End: 2019-05-01

## 2019-05-01 PROBLEM — M43.16 SPONDYLOLISTHESIS, LUMBAR REGION: Chronic | Status: ACTIVE | Noted: 2019-02-22

## 2019-05-01 LAB — SPECIMEN SOURCE: SIGNIFICANT CHANGE UP

## 2019-05-02 LAB
BACTERIA UR CULT: SIGNIFICANT CHANGE UP
SPECIMEN SOURCE: SIGNIFICANT CHANGE UP

## 2019-05-03 LAB — BACTERIA NPH CULT: SIGNIFICANT CHANGE UP

## 2019-05-06 ENCOUNTER — APPOINTMENT (OUTPATIENT)
Dept: INTERNAL MEDICINE | Facility: CLINIC | Age: 79
End: 2019-05-06
Payer: MEDICARE

## 2019-05-06 VITALS — SYSTOLIC BLOOD PRESSURE: 140 MMHG | HEART RATE: 88 BPM | DIASTOLIC BLOOD PRESSURE: 80 MMHG

## 2019-05-06 VITALS
HEIGHT: 59 IN | TEMPERATURE: 98.6 F | HEART RATE: 88 BPM | OXYGEN SATURATION: 97 % | DIASTOLIC BLOOD PRESSURE: 70 MMHG | SYSTOLIC BLOOD PRESSURE: 130 MMHG | WEIGHT: 116 LBS | BODY MASS INDEX: 23.39 KG/M2

## 2019-05-06 DIAGNOSIS — M75.51 BURSITIS OF RIGHT SHOULDER: ICD-10-CM

## 2019-05-06 DIAGNOSIS — M51.26 OTHER INTERVERTEBRAL DISC DISPLACEMENT, LUMBAR REGION: ICD-10-CM

## 2019-05-06 DIAGNOSIS — Z86.79 PERSONAL HISTORY OF OTHER DISEASES OF THE CIRCULATORY SYSTEM: ICD-10-CM

## 2019-05-06 DIAGNOSIS — M89.50 OSTEOLYSIS, UNSPECIFIED SITE: ICD-10-CM

## 2019-05-06 DIAGNOSIS — M54.32 SCIATICA, LEFT SIDE: ICD-10-CM

## 2019-05-06 DIAGNOSIS — M54.16 RADICULOPATHY, LUMBAR REGION: ICD-10-CM

## 2019-05-06 DIAGNOSIS — R76.8 OTHER SPECIFIED ABNORMAL IMMUNOLOGICAL FINDINGS IN SERUM: ICD-10-CM

## 2019-05-06 DIAGNOSIS — Z12.31 ENCOUNTER FOR SCREENING MAMMOGRAM FOR MALIGNANT NEOPLASM OF BREAST: ICD-10-CM

## 2019-05-06 DIAGNOSIS — Z87.39 PERSONAL HISTORY OF OTHER DISEASES OF THE MUSCULOSKELETAL SYSTEM AND CONNECTIVE TISSUE: ICD-10-CM

## 2019-05-06 DIAGNOSIS — M25.50 PAIN IN UNSPECIFIED JOINT: ICD-10-CM

## 2019-05-06 PROCEDURE — 99214 OFFICE O/P EST MOD 30 MIN: CPT | Mod: 25

## 2019-05-06 PROCEDURE — G0439: CPT

## 2019-05-06 PROCEDURE — G0444 DEPRESSION SCREEN ANNUAL: CPT | Mod: 59

## 2019-05-06 RX ORDER — GABAPENTIN 100 MG/1
100 CAPSULE ORAL 3 TIMES DAILY
Qty: 90 | Refills: 1 | Status: DISCONTINUED | COMMUNITY
Start: 2019-01-25 | End: 2019-05-06

## 2019-05-06 RX ORDER — PREDNISONE 10 MG/1
10 TABLET ORAL
Qty: 30 | Refills: 0 | Status: DISCONTINUED | COMMUNITY
Start: 2019-04-01 | End: 2019-05-06

## 2019-05-06 RX ORDER — AMLODIPINE BESYLATE 5 MG/1
5 TABLET ORAL DAILY
Qty: 45 | Refills: 3 | Status: DISCONTINUED | COMMUNITY
Start: 2018-09-07 | End: 2019-05-06

## 2019-05-06 RX ORDER — OXYCODONE AND ACETAMINOPHEN 5; 325 MG/1; MG/1
5-325 TABLET ORAL EVERY 6 HOURS
Qty: 20 | Refills: 0 | Status: DISCONTINUED | COMMUNITY
Start: 2018-11-26 | End: 2019-05-06

## 2019-05-07 PROBLEM — M89.50 OSTEOLYSIS: Status: ACTIVE | Noted: 2019-04-18

## 2019-05-07 PROBLEM — M51.26 LUMBAR DISC HERNIATION: Status: RESOLVED | Noted: 2018-12-12 | Resolved: 2019-05-07

## 2019-05-07 PROBLEM — Z86.79 HISTORY OF ESSENTIAL HYPERTENSION: Status: RESOLVED | Noted: 2018-09-07 | Resolved: 2019-05-07

## 2019-05-07 PROBLEM — M25.50 ARTHRALGIA OF MULTIPLE JOINTS: Status: RESOLVED | Noted: 2018-05-17 | Resolved: 2019-05-07

## 2019-05-07 PROBLEM — R76.8 ANA POSITIVE: Status: RESOLVED | Noted: 2018-05-29 | Resolved: 2019-05-07

## 2019-05-07 PROBLEM — M75.51 SUBACROMIAL BURSITIS OF RIGHT SHOULDER JOINT: Status: RESOLVED | Noted: 2018-06-25 | Resolved: 2019-05-07

## 2019-05-07 PROBLEM — M54.16 LEFT LUMBAR RADICULOPATHY: Status: RESOLVED | Noted: 2018-11-08 | Resolved: 2019-05-07

## 2019-05-07 PROBLEM — M54.32 SCIATICA OF LEFT SIDE: Status: RESOLVED | Noted: 2018-10-16 | Resolved: 2019-05-07

## 2019-05-07 NOTE — HEALTH RISK ASSESSMENT
[Patient reported PAP Smear was normal] : Patient reported PAP Smear was normal [Fully functional (bathing, dressing, toileting, transferring, walking, feeding)] : Fully functional (bathing, dressing, toileting, transferring, walking, feeding) [Fully functional (using the telephone, shopping, preparing meals, housekeeping, doing laundry, using] : Fully functional and needs no help or supervision to perform IADLs (using the telephone, shopping, preparing meals, housekeeping, doing laundry, using transportation, managing medications and managing finances) [Reports changes in vision] : Reports changes in vision [Smoke Detector] : smoke detector [Carbon Monoxide Detector] : carbon monoxide detector [Seat Belt] :  uses seat belt [Sunscreen] : uses sunscreen [Discussed at today's visit] : Advance Directives Discussed at today's visit [Designated Healthcare Proxy] : Designated healthcare proxy [Name: ___] : Health Care Proxy's Name: [unfilled]  [Relationship: ___] : Relationship: [unfilled] [No falls in past year] : Patient reported no falls in the past year [0] : 2) Feeling down, depressed, or hopeless: Not at all (0) [None] : None [Retired] : retired [College] : College [Alone] : lives alone [] :  [Feels Safe at Home] : Feels safe at home [Reviewed no changes] : Reviewed no changes [] : No [Change in mental status noted] : No change in mental status noted [Language] : denies difficulty with language [Behavior] : denies difficulty with behavior [Learning/Retaining New Information] : denies difficulty learning/retaining new information [Handling Complex Tasks] : denies difficulty handling complex tasks [Reasoning] : denies difficulty with reasoning [Spatial Ability and Orientation] : denies difficulty with spatial ability and orientation [Sexually Active] : not sexually active [High Risk Behavior] : no high risk behavior [Reports changes in hearing] : Reports no changes in hearing [Reports normal functional visual acuity (ie: able to read med bottle)] : Reports poor functional visual acuity.  [Reports changes in dental health] : Reports no changes in dental health [Guns at Home] : no guns at home [Travel to Developing Areas] : does not  travel to developing areas [MammogramDate] : 11/2018 [MammogramComments] : birads 2 [PapSmearDate] : 2017 [BoneDensityComments] : stable osteopenia [BoneDensityDate] : 6/2017 [ColonoscopyDate] : 6/2015 [ColonoscopyComments] : no polyps [de-identified] : due for optho...uses magnifying glass [AdvancecareDate] : 05/2019

## 2019-05-07 NOTE — HEALTH RISK ASSESSMENT
[Patient reported PAP Smear was normal] : Patient reported PAP Smear was normal [Fully functional (bathing, dressing, toileting, transferring, walking, feeding)] : Fully functional (bathing, dressing, toileting, transferring, walking, feeding) [Fully functional (using the telephone, shopping, preparing meals, housekeeping, doing laundry, using] : Fully functional and needs no help or supervision to perform IADLs (using the telephone, shopping, preparing meals, housekeeping, doing laundry, using transportation, managing medications and managing finances) [Reports changes in vision] : Reports changes in vision [Smoke Detector] : smoke detector [Carbon Monoxide Detector] : carbon monoxide detector [Seat Belt] :  uses seat belt [Sunscreen] : uses sunscreen [Discussed at today's visit] : Advance Directives Discussed at today's visit [Designated Healthcare Proxy] : Designated healthcare proxy [Name: ___] : Health Care Proxy's Name: [unfilled]  [Relationship: ___] : Relationship: [unfilled] [No falls in past year] : Patient reported no falls in the past year [0] : 2) Feeling down, depressed, or hopeless: Not at all (0) [None] : None [Retired] : retired [College] : College [Alone] : lives alone [] :  [Feels Safe at Home] : Feels safe at home [Reviewed no changes] : Reviewed no changes [] : No [Change in mental status noted] : No change in mental status noted [Language] : denies difficulty with language [Behavior] : denies difficulty with behavior [Learning/Retaining New Information] : denies difficulty learning/retaining new information [Handling Complex Tasks] : denies difficulty handling complex tasks [Reasoning] : denies difficulty with reasoning [Spatial Ability and Orientation] : denies difficulty with spatial ability and orientation [Sexually Active] : not sexually active [High Risk Behavior] : no high risk behavior [Reports changes in hearing] : Reports no changes in hearing [Reports normal functional visual acuity (ie: able to read med bottle)] : Reports poor functional visual acuity.  [Reports changes in dental health] : Reports no changes in dental health [Guns at Home] : no guns at home [Travel to Developing Areas] : does not  travel to developing areas [MammogramDate] : 11/2018 [MammogramComments] : birads 2 [PapSmearDate] : 2017 [BoneDensityComments] : stable osteopenia [BoneDensityDate] : 6/2017 [ColonoscopyDate] : 6/2015 [ColonoscopyComments] : no polyps [de-identified] : due for optho...uses magnifying glass [AdvancecareDate] : 05/2019

## 2019-05-08 ENCOUNTER — OTHER (OUTPATIENT)
Age: 79
End: 2019-05-08

## 2019-05-08 NOTE — ASSESSMENT
[FreeTextEntry1] : Patient remains orthostatic but asymptomatic. She has no cardiopulmonary complaints and did well with the recent spinal surgery and there is no contraindication to planned surgery and anesthesia for left hip replacement. She will hold her Azilect beginning today as this is a MAO inhibitor and may interfere with anesthesia.  She may resume Azilect 2 weeks postoperatively. . The morning of surgery she will take her carbidopa levodopa Levoxyl  and these should be continued postoperatively.  See separate med clearance note.  Will stress DVT precautions, pulmonary toilet and adequate hydration post op and monitoring of her orthostatic blood pressure\par She will followup with a bone density post surgery as well as getting her second Shingrix vaccine which  she states she is unable to do now that she can get to the pharmacy.\par Advanced directives were reviewed and the patient has them  in place [Patient Optimized for Surgery] : Patient optimized for surgery [No Further Testing Recommended] : no further testing recommended [Continue medications as is] : Continue current medications [As per surgery] : as per surgery [FreeTextEntry4] : Patient will take her levothyroxine and carbidopa-levodopa the morning of surgery.  Please continue these medications as well as atorvastatin and lactulose post operatively.  Please monitor orthostatic blood pressure postoperatively.  DVT precautions as per surgery. [FreeTextEntry7] : Hold Azilect as of today and not to resume for 2 weeks post op as this is MAO inhibitor

## 2019-05-08 NOTE — HISTORY OF PRESENT ILLNESS
[de-identified] : She is planning a left total hip replacement May 15 2019 due to  severe pain. She is walking with a walker. She is limping.  It is very difficult for her to get out of her house. She is not driving. She gets her meals delivered. She is taking one Tylenol with codeine a day. She either has severe hip arthritis or avascular necrosis due perhaps to  the prednisone that was given for her back pain. Her  lancinating left leg radicular pain is resolved. She is planning to go home with help after the surgery. Her bowels are fine on lactulose. Her Parkinson's has been stable. She is having some nocturnal urinary incontinence as she can't walk to the bathroom quickly  enough. She can't lift her left leg to elevate it during the day. She sits most of the day. She can shower and dress herself .  She has shower grab bars and  tools to help her dress . She had no problems with the recent spinal surgery.  her amlodipine was stopped as her blood pressure was low post op She recently saw the neurologist and all was fine. [No Pertinent Cardiac History] : no history of aortic stenosis, atrial fibrillation, coronary artery disease, recent myocardial infarction, or implantable device/pacemaker [No Pertinent Pulmonary History] : no history of asthma, COPD, sleep apnea, or smoking [No Adverse Anesthesia Reaction] : no adverse anesthesia reaction in self or family member [(Patient denies any chest pain, claudication, dyspnea on exertion, orthopnea, palpitations or syncope)] : Patient denies any chest pain, claudication, dyspnea on exertion, orthopnea, palpitations or syncope [Moderate (4-6 METs)] : Moderate (4-6 METs) [Aortic Stenosis] : no aortic stenosis [Atrial Fibrillation] : no atrial fibrillation [Coronary Artery Disease] : no coronary artery disease [Recent Myocardial Infarction] : no recent myocardial infarction [Chronic Anticoagulation] : no chronic anticoagulation [Implantable Device/Pacemaker] : no implantable device/pacemaker [Chronic Kidney Disease] : no chronic kidney disease [Diabetes] : no diabetes [FreeTextEntry1] :  PLL L4/5 [FreeTextEntry2] : May 15 , 2019 [FreeTextEntry3] : Dr Bran Barahona [FreeTextEntry4] : She is planning left total hip replacement for possible osteoarthritis and/or avascular necrosis.  She had lumbar laminectomy and lumbar spinal fusion in March 2019 which was uncomplicated.  She lives alone but will be having family with her post-operatively.  She has a history of hypothyroidism and hyperlipidemia well controlled.  She has a several year history of parkinsons disease with orthostatic hypotension.  She has chronic constipation controlled on lactulose.  She has no unusual bruising or bleeding,  [FreeTextEntry5] : Parkinsons disease with orthostasis, hypothyroidism, hyperlipidemia

## 2019-05-08 NOTE — HISTORY OF PRESENT ILLNESS
[de-identified] : She is planning a left total hip replacement May 15 2019 due to  severe pain. She is walking with a walker. She is limping.  It is very difficult for her to get out of her house. She is not driving. She gets her meals delivered. She is taking one Tylenol with codeine a day. She either has severe hip arthritis or avascular necrosis due perhaps to  the prednisone that was given for her back pain. Her  lancinating left leg radicular pain is resolved. She is planning to go home with help after the surgery. Her bowels are fine on lactulose. Her Parkinson's has been stable. She is having some nocturnal urinary incontinence as she can't walk to the bathroom quickly  enough. She can't lift her left leg to elevate it during the day. She sits most of the day. She can shower and dress herself .  She has shower grab bars and  tools to help her dress . She had no problems with the recent spinal surgery.  her amlodipine was stopped as her blood pressure was low post op She recently saw the neurologist and all was fine. [No Pertinent Cardiac History] : no history of aortic stenosis, atrial fibrillation, coronary artery disease, recent myocardial infarction, or implantable device/pacemaker [No Adverse Anesthesia Reaction] : no adverse anesthesia reaction in self or family member [No Pertinent Pulmonary History] : no history of asthma, COPD, sleep apnea, or smoking [(Patient denies any chest pain, claudication, dyspnea on exertion, orthopnea, palpitations or syncope)] : Patient denies any chest pain, claudication, dyspnea on exertion, orthopnea, palpitations or syncope [Moderate (4-6 METs)] : Moderate (4-6 METs) [Aortic Stenosis] : no aortic stenosis [Atrial Fibrillation] : no atrial fibrillation [Coronary Artery Disease] : no coronary artery disease [Recent Myocardial Infarction] : no recent myocardial infarction [Implantable Device/Pacemaker] : no implantable device/pacemaker [Chronic Anticoagulation] : no chronic anticoagulation [Chronic Kidney Disease] : no chronic kidney disease [Diabetes] : no diabetes [FreeTextEntry1] :  PLL L4/5 [FreeTextEntry2] : May 15 , 2019 [FreeTextEntry3] : Dr Bran Barahona [FreeTextEntry4] : She is planning left total hip replacement for possible osteoarthritis and/or avascular necrosis.  She had lumbar laminectomy and lumbar spinal fusion in March 2019 which was uncomplicated.  She lives alone but will be having family with her post-operatively.  She has a history of hypothyroidism and hyperlipidemia well controlled.  She has a several year history of parkinsons disease with orthostatic hypotension.  She has chronic constipation controlled on lactulose.  She has no unusual bruising or bleeding,  [FreeTextEntry5] : Parkinsons disease with orthostasis, hypothyroidism, hyperlipidemia

## 2019-05-08 NOTE — RESULTS/DATA
[] : results reviewed [de-identified] : normal [de-identified] : urine and nasal cuture negative.  HGBA1C 5.7%

## 2019-05-08 NOTE — PHYSICAL EXAM
[Normal Axillary Nodes] : no axillary lymphadenopathy [Normal Inguinal Nodes] : no inguinal lymphadenopathy [No Skin Lesions] : no skin lesions [Kyphosis] : no kyphosis [Scoliosis] : no scoliosis [No Acute Distress] : no acute distress [Well Developed] : well developed [Well Nourished] : well nourished [Normal Sclera/Conjunctiva] : normal sclera/conjunctiva [Well-Appearing] : well-appearing [Normal Voice/Communication] : normal voice/communication [EOMI] : extraocular movements intact [PERRL] : pupils equal round and reactive to light [Normal Oropharynx] : the oropharynx was normal [Normal Outer Ear/Nose] : the outer ears and nose were normal in appearance [Normal TMs] : both tympanic membranes were normal [Supple] : supple [No JVD] : no jugular venous distention [No Lymphadenopathy] : no lymphadenopathy [Thyroid Normal, No Nodules] : the thyroid was normal and there were no nodules present [Clear to Auscultation] : lungs were clear to auscultation bilaterally [No Respiratory Distress] : no respiratory distress  [Normal Percussion] : the chest was normal to percussion [No Accessory Muscle Use] : no accessory muscle use [Normal Rate] : normal rate  [Regular Rhythm] : with a regular rhythm [Normal S1, S2] : normal S1 and S2 [No Carotid Bruits] : no carotid bruits [No Murmur] : no murmur heard [No Abdominal Bruit] : a ~M bruit was not heard ~T in the abdomen [No Varicosities] : no varicosities [No Extremity Clubbing/Cyanosis] : no extremity clubbing/cyanosis [Pedal Pulses Present] : the pedal pulses are present [Normal Appearance] : normal in appearance [No Palpable Aorta] : no palpable aorta [No Nipple Discharge] : no nipple discharge [Soft] : abdomen soft [Non Tender] : non-tender [No Axillary Lymphadenopathy] : no axillary lymphadenopathy [No Masses] : no abdominal mass palpated [Non-distended] : non-distended [Normal Bowel Sounds] : normal bowel sounds [No HSM] : no HSM [Normal Supraclavicular Nodes] : no supraclavicular lymphadenopathy [Normal Posterior Cervical Nodes] : no posterior cervical lymphadenopathy [Normal Anterior Cervical Nodes] : no anterior cervical lymphadenopathy [No CVA Tenderness] : no CVA  tenderness [No Spinal Tenderness] : no spinal tenderness [No Joint Swelling] : no joint swelling [Grossly Normal Strength/Tone] : grossly normal strength/tone [No Rash] : no rash [Normal Gait] : normal gait [Coordination Grossly Intact] : coordination grossly intact [Speech Grossly Normal] : speech grossly normal [Deep Tendon Reflexes (DTR)] : deep tendon reflexes were 2+ and symmetric [Memory Grossly Normal] : memory grossly normal [Normal Affect] : the affect was normal [Alert and Oriented x3] : oriented to person, place, and time [Normal Mood] : the mood was normal [Normal Insight/Judgement] : insight and judgment were intact [de-identified] : trace  bilateral pretibial edema.  No calf tenderness Mary Kay or cords [de-identified] : pain with rotation of left hip 30 degrees [de-identified] : masked facies.  Walking with a walker with a limp.  No tremor or rigidity

## 2019-05-08 NOTE — RESULTS/DATA
[] : results reviewed [de-identified] : normal [de-identified] : urine and nasal cuture negative.  HGBA1C 5.7%

## 2019-05-08 NOTE — PHYSICAL EXAM
[Normal Axillary Nodes] : no axillary lymphadenopathy [Normal Inguinal Nodes] : no inguinal lymphadenopathy [No Skin Lesions] : no skin lesions [Kyphosis] : no kyphosis [Scoliosis] : no scoliosis [No Acute Distress] : no acute distress [Well Nourished] : well nourished [Well Developed] : well developed [Normal Sclera/Conjunctiva] : normal sclera/conjunctiva [Normal Voice/Communication] : normal voice/communication [Well-Appearing] : well-appearing [EOMI] : extraocular movements intact [PERRL] : pupils equal round and reactive to light [Normal Oropharynx] : the oropharynx was normal [Normal Outer Ear/Nose] : the outer ears and nose were normal in appearance [Normal TMs] : both tympanic membranes were normal [No JVD] : no jugular venous distention [Supple] : supple [No Lymphadenopathy] : no lymphadenopathy [Thyroid Normal, No Nodules] : the thyroid was normal and there were no nodules present [No Respiratory Distress] : no respiratory distress  [Clear to Auscultation] : lungs were clear to auscultation bilaterally [Normal Percussion] : the chest was normal to percussion [No Accessory Muscle Use] : no accessory muscle use [Normal Rate] : normal rate  [Regular Rhythm] : with a regular rhythm [No Murmur] : no murmur heard [No Carotid Bruits] : no carotid bruits [Normal S1, S2] : normal S1 and S2 [No Varicosities] : no varicosities [No Abdominal Bruit] : a ~M bruit was not heard ~T in the abdomen [No Extremity Clubbing/Cyanosis] : no extremity clubbing/cyanosis [Pedal Pulses Present] : the pedal pulses are present [Normal Appearance] : normal in appearance [No Palpable Aorta] : no palpable aorta [No Nipple Discharge] : no nipple discharge [Non Tender] : non-tender [Soft] : abdomen soft [No Axillary Lymphadenopathy] : no axillary lymphadenopathy [No Masses] : no abdominal mass palpated [Non-distended] : non-distended [No HSM] : no HSM [Normal Bowel Sounds] : normal bowel sounds [Normal Posterior Cervical Nodes] : no posterior cervical lymphadenopathy [Normal Supraclavicular Nodes] : no supraclavicular lymphadenopathy [No CVA Tenderness] : no CVA  tenderness [Normal Anterior Cervical Nodes] : no anterior cervical lymphadenopathy [No Spinal Tenderness] : no spinal tenderness [No Joint Swelling] : no joint swelling [Grossly Normal Strength/Tone] : grossly normal strength/tone [No Rash] : no rash [Normal Gait] : normal gait [Coordination Grossly Intact] : coordination grossly intact [Deep Tendon Reflexes (DTR)] : deep tendon reflexes were 2+ and symmetric [Speech Grossly Normal] : speech grossly normal [Memory Grossly Normal] : memory grossly normal [Normal Affect] : the affect was normal [Normal Mood] : the mood was normal [Alert and Oriented x3] : oriented to person, place, and time [Normal Insight/Judgement] : insight and judgment were intact [de-identified] : trace  bilateral pretibial edema.  No calf tenderness Mary Kay or cords [de-identified] : masked facies.  Walking with a walker with a limp.  No tremor or rigidity [de-identified] : pain with rotation of left hip 30 degrees

## 2019-05-08 NOTE — ASSESSMENT
[FreeTextEntry1] : Patient remains orthostatic but asymptomatic. She has no cardiopulmonary complaints and did well with the recent spinal surgery and there is no contraindication to planned surgery and anesthesia for left hip replacement. She will hold her Azilect beginning today as this is a MAO inhibitor and may interfere with anesthesia.  She may resume Azilect 2 weeks postoperatively. . The morning of surgery she will take her carbidopa levodopa Levoxyl  and these should be continued postoperatively.  See separate med clearance note.  Will stress DVT precautions, pulmonary toilet and adequate hydration post op and monitoring of her orthostatic blood pressure\par She will followup with a bone density post surgery as well as getting her second Shingrix vaccine which  she states she is unable to do now that she can get to the pharmacy.\par Advanced directives were reviewed and the patient has them  in place [Patient Optimized for Surgery] : Patient optimized for surgery [Continue medications as is] : Continue current medications [No Further Testing Recommended] : no further testing recommended [As per surgery] : as per surgery [FreeTextEntry4] : Patient will take her levothyroxine and carbidopa-levodopa the morning of surgery.  Please continue these medications as well as atorvastatin and lactulose post operatively.  Please monitor orthostatic blood pressure postoperatively.  DVT precautions as per surgery. [FreeTextEntry7] : Hold Azilect as of today and not to resume for 2 weeks post op as this is MAO inhibitor

## 2019-05-14 ENCOUNTER — TRANSCRIPTION ENCOUNTER (OUTPATIENT)
Age: 79
End: 2019-05-14

## 2019-05-15 ENCOUNTER — INPATIENT (INPATIENT)
Facility: HOSPITAL | Age: 79
LOS: 1 days | Discharge: HOME HEALTH | End: 2019-05-17
Attending: ORTHOPAEDIC SURGERY | Admitting: ORTHOPAEDIC SURGERY
Payer: MEDICARE

## 2019-05-15 ENCOUNTER — APPOINTMENT (OUTPATIENT)
Dept: ORTHOPEDIC SURGERY | Facility: HOSPITAL | Age: 79
End: 2019-05-15

## 2019-05-15 ENCOUNTER — RESULT REVIEW (OUTPATIENT)
Age: 79
End: 2019-05-15

## 2019-05-15 VITALS
SYSTOLIC BLOOD PRESSURE: 178 MMHG | HEART RATE: 75 BPM | OXYGEN SATURATION: 100 % | WEIGHT: 115.08 LBS | HEIGHT: 58 IN | TEMPERATURE: 98 F | DIASTOLIC BLOOD PRESSURE: 73 MMHG | RESPIRATION RATE: 16 BRPM

## 2019-05-15 DIAGNOSIS — Z98.890 OTHER SPECIFIED POSTPROCEDURAL STATES: Chronic | ICD-10-CM

## 2019-05-15 DIAGNOSIS — Z90.89 ACQUIRED ABSENCE OF OTHER ORGANS: Chronic | ICD-10-CM

## 2019-05-15 DIAGNOSIS — M16.12 UNILATERAL PRIMARY OSTEOARTHRITIS, LEFT HIP: ICD-10-CM

## 2019-05-15 DIAGNOSIS — E89.0 POSTPROCEDURAL HYPOTHYROIDISM: Chronic | ICD-10-CM

## 2019-05-15 LAB
ANION GAP SERPL CALC-SCNC: 10 MMO/L — SIGNIFICANT CHANGE UP (ref 7–14)
BUN SERPL-MCNC: 20 MG/DL — SIGNIFICANT CHANGE UP (ref 7–23)
CALCIUM SERPL-MCNC: 8.9 MG/DL — SIGNIFICANT CHANGE UP (ref 8.4–10.5)
CHLORIDE SERPL-SCNC: 102 MMOL/L — SIGNIFICANT CHANGE UP (ref 98–107)
CO2 SERPL-SCNC: 28 MMOL/L — SIGNIFICANT CHANGE UP (ref 22–31)
CREAT SERPL-MCNC: 0.7 MG/DL — SIGNIFICANT CHANGE UP (ref 0.5–1.3)
GLUCOSE BLDC GLUCOMTR-MCNC: 92 MG/DL — SIGNIFICANT CHANGE UP (ref 70–99)
GLUCOSE SERPL-MCNC: 142 MG/DL — HIGH (ref 70–99)
GRAM STN WND: SIGNIFICANT CHANGE UP
HCT VFR BLD CALC: 39.2 % — SIGNIFICANT CHANGE UP (ref 34.5–45)
HGB BLD-MCNC: 12.5 G/DL — SIGNIFICANT CHANGE UP (ref 11.5–15.5)
MCHC RBC-ENTMCNC: 28.6 PG — SIGNIFICANT CHANGE UP (ref 27–34)
MCHC RBC-ENTMCNC: 31.9 % — LOW (ref 32–36)
MCV RBC AUTO: 89.7 FL — SIGNIFICANT CHANGE UP (ref 80–100)
NRBC # FLD: 0 K/UL — SIGNIFICANT CHANGE UP (ref 0–0)
PLATELET # BLD AUTO: 236 K/UL — SIGNIFICANT CHANGE UP (ref 150–400)
PMV BLD: 10.4 FL — SIGNIFICANT CHANGE UP (ref 7–13)
POTASSIUM SERPL-MCNC: 3.3 MMOL/L — LOW (ref 3.5–5.3)
POTASSIUM SERPL-SCNC: 3.3 MMOL/L — LOW (ref 3.5–5.3)
RBC # BLD: 4.37 M/UL — SIGNIFICANT CHANGE UP (ref 3.8–5.2)
RBC # FLD: 16.3 % — HIGH (ref 10.3–14.5)
RH IG SCN BLD-IMP: POSITIVE — SIGNIFICANT CHANGE UP
SODIUM SERPL-SCNC: 140 MMOL/L — SIGNIFICANT CHANGE UP (ref 135–145)
SPECIMEN SOURCE: SIGNIFICANT CHANGE UP
WBC # BLD: 16.7 K/UL — HIGH (ref 3.8–10.5)
WBC # FLD AUTO: 16.7 K/UL — HIGH (ref 3.8–10.5)

## 2019-05-15 PROCEDURE — 88304 TISSUE EXAM BY PATHOLOGIST: CPT | Mod: 26

## 2019-05-15 PROCEDURE — 27130 TOTAL HIP ARTHROPLASTY: CPT | Mod: LT

## 2019-05-15 PROCEDURE — 88311 DECALCIFY TISSUE: CPT | Mod: 26

## 2019-05-15 PROCEDURE — 73501 X-RAY EXAM HIP UNI 1 VIEW: CPT | Mod: 26,LT

## 2019-05-15 PROCEDURE — 88305 TISSUE EXAM BY PATHOLOGIST: CPT | Mod: 26

## 2019-05-15 RX ORDER — HYDROMORPHONE HYDROCHLORIDE 2 MG/ML
0.5 INJECTION INTRAMUSCULAR; INTRAVENOUS; SUBCUTANEOUS EVERY 4 HOURS
Refills: 0 | Status: DISCONTINUED | OUTPATIENT
Start: 2019-05-15 | End: 2019-05-17

## 2019-05-15 RX ORDER — CARBIDOPA AND LEVODOPA 25; 100 MG/1; MG/1
1 TABLET ORAL THREE TIMES A DAY
Refills: 0 | Status: DISCONTINUED | OUTPATIENT
Start: 2019-05-15 | End: 2019-05-17

## 2019-05-15 RX ORDER — ACETAMINOPHEN 500 MG
975 TABLET ORAL ONCE
Refills: 0 | Status: COMPLETED | OUTPATIENT
Start: 2019-05-15 | End: 2019-05-15

## 2019-05-15 RX ORDER — ASPIRIN/CALCIUM CARB/MAGNESIUM 324 MG
325 TABLET ORAL
Refills: 0 | Status: DISCONTINUED | OUTPATIENT
Start: 2019-05-15 | End: 2019-05-17

## 2019-05-15 RX ORDER — OXYCODONE HYDROCHLORIDE 5 MG/1
10 TABLET ORAL EVERY 4 HOURS
Refills: 0 | Status: DISCONTINUED | OUTPATIENT
Start: 2019-05-15 | End: 2019-05-15

## 2019-05-15 RX ORDER — ONDANSETRON 8 MG/1
4 TABLET, FILM COATED ORAL EVERY 6 HOURS
Refills: 0 | Status: DISCONTINUED | OUTPATIENT
Start: 2019-05-15 | End: 2019-05-17

## 2019-05-15 RX ORDER — ATORVASTATIN CALCIUM 80 MG/1
40 TABLET, FILM COATED ORAL AT BEDTIME
Refills: 0 | Status: DISCONTINUED | OUTPATIENT
Start: 2019-05-15 | End: 2019-05-17

## 2019-05-15 RX ORDER — HYDROMORPHONE HYDROCHLORIDE 2 MG/ML
0.5 INJECTION INTRAMUSCULAR; INTRAVENOUS; SUBCUTANEOUS
Refills: 0 | Status: DISCONTINUED | OUTPATIENT
Start: 2019-05-15 | End: 2019-05-15

## 2019-05-15 RX ORDER — PANTOPRAZOLE SODIUM 20 MG/1
40 TABLET, DELAYED RELEASE ORAL
Refills: 0 | Status: DISCONTINUED | OUTPATIENT
Start: 2019-05-15 | End: 2019-05-17

## 2019-05-15 RX ORDER — ACETAMINOPHEN 500 MG
650 TABLET ORAL EVERY 6 HOURS
Refills: 0 | Status: DISCONTINUED | OUTPATIENT
Start: 2019-05-15 | End: 2019-05-17

## 2019-05-15 RX ORDER — KETOROLAC TROMETHAMINE 30 MG/ML
15 SYRINGE (ML) INJECTION EVERY 8 HOURS
Refills: 0 | Status: DISCONTINUED | OUTPATIENT
Start: 2019-05-15 | End: 2019-05-16

## 2019-05-15 RX ORDER — GABAPENTIN 400 MG/1
100 CAPSULE ORAL THREE TIMES A DAY
Refills: 0 | Status: DISCONTINUED | OUTPATIENT
Start: 2019-05-15 | End: 2019-05-17

## 2019-05-15 RX ORDER — TRAMADOL HYDROCHLORIDE 50 MG/1
50 TABLET ORAL ONCE
Refills: 0 | Status: DISCONTINUED | OUTPATIENT
Start: 2019-05-15 | End: 2019-05-15

## 2019-05-15 RX ORDER — SODIUM CHLORIDE 9 MG/ML
1000 INJECTION, SOLUTION INTRAVENOUS
Refills: 0 | Status: DISCONTINUED | OUTPATIENT
Start: 2019-05-15 | End: 2019-05-15

## 2019-05-15 RX ORDER — SODIUM CHLORIDE 9 MG/ML
1000 INJECTION INTRAMUSCULAR; INTRAVENOUS; SUBCUTANEOUS ONCE
Refills: 0 | Status: COMPLETED | OUTPATIENT
Start: 2019-05-15 | End: 2019-05-15

## 2019-05-15 RX ORDER — DOCUSATE SODIUM 100 MG
100 CAPSULE ORAL THREE TIMES A DAY
Refills: 0 | Status: DISCONTINUED | OUTPATIENT
Start: 2019-05-15 | End: 2019-05-17

## 2019-05-15 RX ORDER — HYDROMORPHONE HYDROCHLORIDE 2 MG/ML
1 INJECTION INTRAMUSCULAR; INTRAVENOUS; SUBCUTANEOUS
Refills: 0 | Status: DISCONTINUED | OUTPATIENT
Start: 2019-05-15 | End: 2019-05-15

## 2019-05-15 RX ORDER — OXYCODONE HYDROCHLORIDE 5 MG/1
10 TABLET ORAL
Refills: 0 | Status: DISCONTINUED | OUTPATIENT
Start: 2019-05-15 | End: 2019-05-17

## 2019-05-15 RX ORDER — OXYCODONE HYDROCHLORIDE 5 MG/1
5 TABLET ORAL EVERY 4 HOURS
Refills: 0 | Status: DISCONTINUED | OUTPATIENT
Start: 2019-05-15 | End: 2019-05-15

## 2019-05-15 RX ORDER — SENNA PLUS 8.6 MG/1
2 TABLET ORAL AT BEDTIME
Refills: 0 | Status: DISCONTINUED | OUTPATIENT
Start: 2019-05-15 | End: 2019-05-17

## 2019-05-15 RX ORDER — SODIUM CHLORIDE 9 MG/ML
1000 INJECTION INTRAMUSCULAR; INTRAVENOUS; SUBCUTANEOUS ONCE
Refills: 0 | Status: COMPLETED | OUTPATIENT
Start: 2019-05-16 | End: 2019-05-16

## 2019-05-15 RX ORDER — POTASSIUM CHLORIDE 20 MEQ
10 PACKET (EA) ORAL
Refills: 0 | Status: COMPLETED | OUTPATIENT
Start: 2019-05-15 | End: 2019-05-15

## 2019-05-15 RX ORDER — NALOXONE HYDROCHLORIDE 4 MG/.1ML
0.1 SPRAY NASAL
Refills: 0 | Status: DISCONTINUED | OUTPATIENT
Start: 2019-05-15 | End: 2019-05-17

## 2019-05-15 RX ORDER — OXYCODONE HYDROCHLORIDE 5 MG/1
10 TABLET ORAL ONCE
Refills: 0 | Status: DISCONTINUED | OUTPATIENT
Start: 2019-05-15 | End: 2019-05-15

## 2019-05-15 RX ORDER — POLYETHYLENE GLYCOL 3350 17 G/17G
17 POWDER, FOR SOLUTION ORAL DAILY
Refills: 0 | Status: DISCONTINUED | OUTPATIENT
Start: 2019-05-15 | End: 2019-05-17

## 2019-05-15 RX ORDER — HYDROMORPHONE HYDROCHLORIDE 2 MG/ML
1 INJECTION INTRAMUSCULAR; INTRAVENOUS; SUBCUTANEOUS
Refills: 0 | Status: DISCONTINUED | OUTPATIENT
Start: 2019-05-15 | End: 2019-05-17

## 2019-05-15 RX ORDER — LEVOTHYROXINE SODIUM 125 MCG
100 TABLET ORAL DAILY
Refills: 0 | Status: DISCONTINUED | OUTPATIENT
Start: 2019-05-15 | End: 2019-05-17

## 2019-05-15 RX ORDER — MORPHINE SULFATE 50 MG/1
0.1 CAPSULE, EXTENDED RELEASE ORAL ONCE
Refills: 0 | Status: DISCONTINUED | OUTPATIENT
Start: 2019-05-15 | End: 2019-05-16

## 2019-05-15 RX ORDER — RASAGILINE 0.5 MG/1
1 TABLET ORAL DAILY
Refills: 0 | Status: DISCONTINUED | OUTPATIENT
Start: 2019-05-15 | End: 2019-05-17

## 2019-05-15 RX ORDER — CEFAZOLIN SODIUM 1 G
2000 VIAL (EA) INJECTION EVERY 8 HOURS
Refills: 0 | Status: COMPLETED | OUTPATIENT
Start: 2019-05-15 | End: 2019-05-16

## 2019-05-15 RX ORDER — PANTOPRAZOLE SODIUM 20 MG/1
40 TABLET, DELAYED RELEASE ORAL ONCE
Refills: 0 | Status: COMPLETED | OUTPATIENT
Start: 2019-05-15 | End: 2019-05-15

## 2019-05-15 RX ORDER — OXYCODONE HYDROCHLORIDE 5 MG/1
5 TABLET ORAL
Refills: 0 | Status: DISCONTINUED | OUTPATIENT
Start: 2019-05-15 | End: 2019-05-17

## 2019-05-15 RX ORDER — SODIUM CHLORIDE 9 MG/ML
1000 INJECTION, SOLUTION INTRAVENOUS
Refills: 0 | Status: DISCONTINUED | OUTPATIENT
Start: 2019-05-15 | End: 2019-05-17

## 2019-05-15 RX ADMIN — PANTOPRAZOLE SODIUM 40 MILLIGRAM(S): 20 TABLET, DELAYED RELEASE ORAL at 06:54

## 2019-05-15 RX ADMIN — ATORVASTATIN CALCIUM 40 MILLIGRAM(S): 80 TABLET, FILM COATED ORAL at 21:10

## 2019-05-15 RX ADMIN — SODIUM CHLORIDE 1000 MILLILITER(S): 9 INJECTION INTRAMUSCULAR; INTRAVENOUS; SUBCUTANEOUS at 13:14

## 2019-05-15 RX ADMIN — GABAPENTIN 100 MILLIGRAM(S): 400 CAPSULE ORAL at 15:26

## 2019-05-15 RX ADMIN — RASAGILINE 1 MILLIGRAM(S): 0.5 TABLET ORAL at 15:25

## 2019-05-15 RX ADMIN — Medication 100 MILLIGRAM(S): at 17:35

## 2019-05-15 RX ADMIN — GABAPENTIN 100 MILLIGRAM(S): 400 CAPSULE ORAL at 21:10

## 2019-05-15 RX ADMIN — Medication 100 MILLIEQUIVALENT(S): at 14:39

## 2019-05-15 RX ADMIN — Medication 650 MILLIGRAM(S): at 17:36

## 2019-05-15 RX ADMIN — Medication 100 MILLIEQUIVALENT(S): at 18:09

## 2019-05-15 RX ADMIN — Medication 325 MILLIGRAM(S): at 17:35

## 2019-05-15 RX ADMIN — Medication 15 MILLIGRAM(S): at 17:35

## 2019-05-15 RX ADMIN — SODIUM CHLORIDE 100 MILLILITER(S): 9 INJECTION, SOLUTION INTRAVENOUS at 13:14

## 2019-05-15 RX ADMIN — CARBIDOPA AND LEVODOPA 1 TABLET(S): 25; 100 TABLET ORAL at 15:26

## 2019-05-15 RX ADMIN — Medication 15 MILLIGRAM(S): at 17:34

## 2019-05-15 RX ADMIN — Medication 975 MILLIGRAM(S): at 06:54

## 2019-05-15 RX ADMIN — Medication 650 MILLIGRAM(S): at 17:35

## 2019-05-15 RX ADMIN — Medication 100 MILLIGRAM(S): at 21:09

## 2019-05-15 RX ADMIN — SODIUM CHLORIDE 30 MILLILITER(S): 9 INJECTION, SOLUTION INTRAVENOUS at 06:54

## 2019-05-15 RX ADMIN — Medication 100 MILLIEQUIVALENT(S): at 16:34

## 2019-05-15 RX ADMIN — Medication 75 MILLIGRAM(S): at 06:54

## 2019-05-15 RX ADMIN — SENNA PLUS 2 TABLET(S): 8.6 TABLET ORAL at 21:09

## 2019-05-15 RX ADMIN — CARBIDOPA AND LEVODOPA 1 TABLET(S): 25; 100 TABLET ORAL at 21:10

## 2019-05-15 NOTE — ASU PATIENT PROFILE, ADULT - PMH
MARK positive    Edema of lower extremity  seen by PMD doppler done -as per patient normal  Essential hypertension    H/O Graves' disease    HLD (hyperlipidemia)    Hypothyroidism    MVP (mitral valve prolapse)  -no cardiology  Osteoporosis    Parkinson's disease  dx 2012  Spondylolisthesis of lumbar region  s/p lumbar laminectomy with fusion in 3/2019

## 2019-05-15 NOTE — ASU PATIENT PROFILE, ADULT - PSH
H/O colonoscopy  2018  History of shoulder surgery  Right- long time ago  History of thyroidectomy, total  1992  History of tonsillectomy  childhood  S/P lumbar laminectomy  with fusion in 3/2019

## 2019-05-15 NOTE — PATIENT PROFILE ADULT - NSPROMUTINFOINDIVIDFT_GEN_A_NUR
none "I do not want to be given any more Steroids. They have given me problems in the past. If I need to have them I want to discuss it with the doctors first'

## 2019-05-15 NOTE — PHYSICAL THERAPY INITIAL EVALUATION ADULT - PATIENT PROFILE REVIEW, REHAB EVAL
yes/Ambulate as Tolerated 5/15 - Confirmed with Dr. Yu, who was present prior to initiation of PT Evaluation.

## 2019-05-15 NOTE — PHYSICAL THERAPY INITIAL EVALUATION ADULT - LIVES WITH, PROFILE
alone/Daughter and then daughter-in-law to be staying with patient for next couple of weeks. No steps that patient has to use at present.

## 2019-05-15 NOTE — PROGRESS NOTE ADULT - SUBJECTIVE AND OBJECTIVE BOX
Patient is seen and examined at bedside. Denies any CP / SOB / Dizziness / N /V /D/ HA. Pain controlled.      Vital Signs Last 24 Hrs  T(C): 36.4 (15 May 2019 11:15), Max: 36.6 (15 May 2019 06:16)  T(F): 97.5 (15 May 2019 11:15), Max: 97.9 (15 May 2019 06:16)  HR: 73 (15 May 2019 12:00) (64 - 75)  BP: 117/62 (15 May 2019 12:00) (108/72 - 178/73)  BP(mean): 76 (15 May 2019 12:00) (47 - 83)  RR: 14 (15 May 2019 12:00) (14 - 19)  SpO2: 100% (15 May 2019 12:00) (100% - 100%)    Gen: NAD    LLE: Dressing C/D/I.   LLE: Motor intact EHL/FHL/ TA/ GS. Sensation is grossly intact to light touch in the distal extremities. Compartments are Abduction pillow in place. Extremities are warm. Pulses dopplerable.     Labs:                        12.5   16.70 )-----------( 236      ( 15 May 2019 11:47 )             39.2         BMP Pending:    A/P: Patient is a 78 y/o Female s/p L Total Hip Arthroplasty, POD # 0    - Pain control / Analgesia  - Inc Spirometry   - Venodynes  - DVT Prophylaxis  - Abduction pillow   - PT / OT  -FU BMP / AM labs  - WBAT / OOB  - Notify ortho with questions Patient is seen and examined at bedside. Denies any CP / SOB / Dizziness / N /V /D/ HA. Pain controlled.      Vital Signs Last 24 Hrs  T(C): 36.4 (15 May 2019 11:15), Max: 36.6 (15 May 2019 06:16)  T(F): 97.5 (15 May 2019 11:15), Max: 97.9 (15 May 2019 06:16)  HR: 73 (15 May 2019 12:00) (64 - 75)  BP: 117/62 (15 May 2019 12:00) (108/72 - 178/73)  BP(mean): 76 (15 May 2019 12:00) (47 - 83)  RR: 14 (15 May 2019 12:00) (14 - 19)  SpO2: 100% (15 May 2019 12:00) (100% - 100%)    Gen: NAD    LLE: Dressing C/D/I.   LLE: Motor intact EHL/FHL/ TA/ GS. Sensation is grossly intact to light touch in the distal extremities. Compartments are soft, Abduction pillow in place. Extremities are warm. Pulses dopplerable.     Labs:                        12.5   16.70 )-----------( 236      ( 15 May 2019 11:47 )             39.2     BMP Pending    A/P: Patient is a 80 y/o Female s/p L Total Hip Arthroplasty, POD # 0    - Pain control / Analgesia  - Inc Spirometry   - Venodynes  - DVT Prophylaxis  - Abduction pillow   - PT / OT  -FU BMP / AM labs  - WBAT / OOB  - Notify ortho with questions

## 2019-05-15 NOTE — PATIENT PROFILE ADULT - FALL HARM RISK
bones(Osteoporosis,prev fx,steroid use,metastatic bone ca)/surgery/coagulation(Bleeding disorder R/T clinical cond/anti-coags)/other

## 2019-05-16 ENCOUNTER — TRANSCRIPTION ENCOUNTER (OUTPATIENT)
Age: 79
End: 2019-05-16

## 2019-05-16 DIAGNOSIS — G20 PARKINSON'S DISEASE: ICD-10-CM

## 2019-05-16 DIAGNOSIS — D62 ACUTE POSTHEMORRHAGIC ANEMIA: ICD-10-CM

## 2019-05-16 DIAGNOSIS — G89.18 OTHER ACUTE POSTPROCEDURAL PAIN: ICD-10-CM

## 2019-05-16 LAB
ANION GAP SERPL CALC-SCNC: 11 MMO/L — SIGNIFICANT CHANGE UP (ref 7–14)
BUN SERPL-MCNC: 19 MG/DL — SIGNIFICANT CHANGE UP (ref 7–23)
CALCIUM SERPL-MCNC: 8.5 MG/DL — SIGNIFICANT CHANGE UP (ref 8.4–10.5)
CHLORIDE SERPL-SCNC: 106 MMOL/L — SIGNIFICANT CHANGE UP (ref 98–107)
CO2 SERPL-SCNC: 22 MMOL/L — SIGNIFICANT CHANGE UP (ref 22–31)
CREAT SERPL-MCNC: 0.64 MG/DL — SIGNIFICANT CHANGE UP (ref 0.5–1.3)
GLUCOSE SERPL-MCNC: 131 MG/DL — HIGH (ref 70–99)
HCT VFR BLD CALC: 30.5 % — LOW (ref 34.5–45)
HGB BLD-MCNC: 9.6 G/DL — LOW (ref 11.5–15.5)
MCHC RBC-ENTMCNC: 28.2 PG — SIGNIFICANT CHANGE UP (ref 27–34)
MCHC RBC-ENTMCNC: 31.5 % — LOW (ref 32–36)
MCV RBC AUTO: 89.4 FL — SIGNIFICANT CHANGE UP (ref 80–100)
NRBC # FLD: 0 K/UL — SIGNIFICANT CHANGE UP (ref 0–0)
PLATELET # BLD AUTO: 214 K/UL — SIGNIFICANT CHANGE UP (ref 150–400)
PMV BLD: 11.2 FL — SIGNIFICANT CHANGE UP (ref 7–13)
POTASSIUM SERPL-MCNC: 4.3 MMOL/L — SIGNIFICANT CHANGE UP (ref 3.5–5.3)
POTASSIUM SERPL-SCNC: 4.3 MMOL/L — SIGNIFICANT CHANGE UP (ref 3.5–5.3)
RBC # BLD: 3.41 M/UL — LOW (ref 3.8–5.2)
RBC # FLD: 17.3 % — HIGH (ref 10.3–14.5)
SODIUM SERPL-SCNC: 139 MMOL/L — SIGNIFICANT CHANGE UP (ref 135–145)
WBC # BLD: 12.96 K/UL — HIGH (ref 3.8–10.5)
WBC # FLD AUTO: 12.96 K/UL — HIGH (ref 3.8–10.5)

## 2019-05-16 PROCEDURE — 99223 1ST HOSP IP/OBS HIGH 75: CPT

## 2019-05-16 RX ORDER — SENNA PLUS 8.6 MG/1
2 TABLET ORAL
Qty: 10 | Refills: 0
Start: 2019-05-16 | End: 2019-05-20

## 2019-05-16 RX ORDER — GABAPENTIN 400 MG/1
1 CAPSULE ORAL
Qty: 42 | Refills: 0
Start: 2019-05-16 | End: 2019-05-29

## 2019-05-16 RX ORDER — LACTULOSE 10 G/15ML
15 SOLUTION ORAL
Qty: 0 | Refills: 0 | DISCHARGE

## 2019-05-16 RX ORDER — ACETAMINOPHEN 500 MG
3 TABLET ORAL
Qty: 84 | Refills: 2
Start: 2019-05-16 | End: 2019-06-05

## 2019-05-16 RX ORDER — ALENDRONATE SODIUM 70 MG/1
1 TABLET ORAL
Qty: 0 | Refills: 0 | DISCHARGE

## 2019-05-16 RX ORDER — PANTOPRAZOLE SODIUM 20 MG/1
1 TABLET, DELAYED RELEASE ORAL
Qty: 30 | Refills: 0
Start: 2019-05-16 | End: 2019-06-14

## 2019-05-16 RX ORDER — ASPIRIN/CALCIUM CARB/MAGNESIUM 324 MG
1 TABLET ORAL
Qty: 84 | Refills: 0
Start: 2019-05-16 | End: 2019-06-26

## 2019-05-16 RX ORDER — DOCUSATE SODIUM 100 MG
1 CAPSULE ORAL
Qty: 15 | Refills: 0
Start: 2019-05-16 | End: 2019-05-20

## 2019-05-16 RX ADMIN — PANTOPRAZOLE SODIUM 40 MILLIGRAM(S): 20 TABLET, DELAYED RELEASE ORAL at 05:53

## 2019-05-16 RX ADMIN — Medication 100 MILLIGRAM(S): at 01:53

## 2019-05-16 RX ADMIN — Medication 650 MILLIGRAM(S): at 01:53

## 2019-05-16 RX ADMIN — SODIUM CHLORIDE 1000 MILLILITER(S): 9 INJECTION INTRAMUSCULAR; INTRAVENOUS; SUBCUTANEOUS at 05:51

## 2019-05-16 RX ADMIN — CARBIDOPA AND LEVODOPA 1 TABLET(S): 25; 100 TABLET ORAL at 05:52

## 2019-05-16 RX ADMIN — Medication 15 MILLIGRAM(S): at 01:53

## 2019-05-16 RX ADMIN — GABAPENTIN 100 MILLIGRAM(S): 400 CAPSULE ORAL at 05:52

## 2019-05-16 RX ADMIN — Medication 100 MILLIGRAM(S): at 05:52

## 2019-05-16 RX ADMIN — Medication 100 MILLIGRAM(S): at 14:47

## 2019-05-16 RX ADMIN — Medication 15 MILLIGRAM(S): at 11:02

## 2019-05-16 RX ADMIN — Medication 325 MILLIGRAM(S): at 05:52

## 2019-05-16 RX ADMIN — CARBIDOPA AND LEVODOPA 1 TABLET(S): 25; 100 TABLET ORAL at 21:28

## 2019-05-16 RX ADMIN — Medication 325 MILLIGRAM(S): at 18:46

## 2019-05-16 RX ADMIN — Medication 650 MILLIGRAM(S): at 14:47

## 2019-05-16 RX ADMIN — Medication 100 MICROGRAM(S): at 05:51

## 2019-05-16 RX ADMIN — CARBIDOPA AND LEVODOPA 1 TABLET(S): 25; 100 TABLET ORAL at 14:47

## 2019-05-16 RX ADMIN — GABAPENTIN 100 MILLIGRAM(S): 400 CAPSULE ORAL at 14:47

## 2019-05-16 RX ADMIN — POLYETHYLENE GLYCOL 3350 17 GRAM(S): 17 POWDER, FOR SOLUTION ORAL at 12:44

## 2019-05-16 RX ADMIN — Medication 650 MILLIGRAM(S): at 18:46

## 2019-05-16 RX ADMIN — ATORVASTATIN CALCIUM 40 MILLIGRAM(S): 80 TABLET, FILM COATED ORAL at 21:29

## 2019-05-16 RX ADMIN — Medication 650 MILLIGRAM(S): at 07:52

## 2019-05-16 RX ADMIN — Medication 650 MILLIGRAM(S): at 12:44

## 2019-05-16 RX ADMIN — Medication 15 MILLIGRAM(S): at 12:44

## 2019-05-16 RX ADMIN — RASAGILINE 1 MILLIGRAM(S): 0.5 TABLET ORAL at 12:43

## 2019-05-16 RX ADMIN — GABAPENTIN 100 MILLIGRAM(S): 400 CAPSULE ORAL at 21:28

## 2019-05-16 NOTE — DISCHARGE NOTE PROVIDER - HOSPITAL COURSE
Patient is an 80 y/o female s/p left hip arthroplasty on 5/15/19. Patient tolerated the procedure well. Patient states pain is controlled. Tolerated Physical Therapy well. As per Dr Yu patient is stable and ready for discharge. Patient was given 1 unit of PRBC intraoperatively for blood loss. Patient was seen and evaluated by medical attending during admission and cleared for safe discharge home.     Please follow up with Dr Yu in 1 week. Call office to make an appointment. Please follow up with your PMD for continuity of care and management. Please keep dressing clean, dry, intact. Any sutures/staples to be removed on post op day # 14.  Please take aspirin twice daily for DVT Prophylaxis. Use total hip precautions. Weight bearing as tolerated. Call orthopaedics with any questions. Patient is an 78 y/o female s/p left hip arthroplasty on 5/15/19. Patient tolerated the procedure well. Patient states pain is controlled. Tolerated Physical Therapy well. As per Dr Yu patient is stable and ready for discharge. Patient was given 1 unit of PRBC intraoperatively for blood loss. Patient was seen and evaluated by medical attending during admission and cleared for safe discharge home.     Please follow up with Dr Yu in 1 week. Call office to make an appointment. Please follow up with your PMD for continuity of care and management. Please keep dressing clean, dry, intact. Any sutures/staples to be removed on post op day # 14.  Please take aspirin twice daily for DVT Prophylaxis. Use total hip precautions. Weight bearing as tolerated. Call orthopaedics with any questions. Please speak to Dr Yu in office in prior to resuming Alendronate.

## 2019-05-16 NOTE — DISCHARGE NOTE PROVIDER - NSDCCPCAREPLAN_GEN_ALL_CORE_FT
PRINCIPAL DISCHARGE DIAGNOSIS  Diagnosis: Unilateral primary osteoarthritis, left hip  Assessment and Plan of Treatment: Patient is an 80 y/o female s/p left hip arthroplasty on 5/15/19. Patient tolerated the procedure well. Patient states pain is controlled. Tolerated Physical Therapy well. As per Dr Yu patient is stable and ready for discharge. Patient was given 1 unit of PRBC intraoperatively for blood loss. Patient was seen and evaluated by medical attending during admission and cleared for safe discharge home.   Please follow up with Dr Yu in 1 week. Call office to make an appointment. Please follow up with your PMD for continuity of care and management. Please keep dressing clean, dry, intact. Any sutures/staples to be removed on post op day # 14.  Please take aspirin twice daily for DVT Prophylaxis. Use total hip precautions. Weight bearing as tolerated. Call orthopaedics with any questions. PRINCIPAL DISCHARGE DIAGNOSIS  Diagnosis: Unilateral primary osteoarthritis, left hip  Assessment and Plan of Treatment: Patient is an 78 y/o female s/p left hip arthroplasty on 5/15/19. Patient tolerated the procedure well. Patient states pain is controlled. Tolerated Physical Therapy well. As per Dr Yu patient is stable and ready for discharge. Patient was given 1 unit of PRBC intraoperatively for blood loss. Patient was seen and evaluated by medical attending during admission and cleared for safe discharge home.   Please follow up with Dr Yu in 1 week. Call office to make an appointment. Please follow up with your PMD for continuity of care and management. Please keep dressing clean, dry, intact. Any sutures/staples to be removed on post op day # 14.  Please take aspirin twice daily for DVT Prophylaxis. Use total hip precautions. Weight bearing as tolerated. Call orthopaedics with any questions.   Please speak to Dr Yu in office in prior to resuming Alendronate.

## 2019-05-16 NOTE — CONSULT NOTE ADULT - PROBLEM SELECTOR RECOMMENDATION 3
Pain controlled  - Tylenol 650mg q6h standing  - hydromorphone 0.5-1mg IV for breakthrough pain  - oxycodone PRN for severe /moderate pain  - daily bowel regimen to ensure adequate daily BMs

## 2019-05-16 NOTE — PROGRESS NOTE ADULT - SUBJECTIVE AND OBJECTIVE BOX
Day _2__ of Anesthesia Pain Management Service    SUBJECTIVE:    Therapy:	  [ ] IV PCA	   [ ] Epidural           [x ] s/p Spinal Opoid              [ ] Postpartum infusion	  [ ] Patient controlled regional anesthesia (PCRA)    [ ] prn Analgesics    OBJECTIVE:   [x ] No new signs     [ ] Other:    Side Effects:  [x ] None			[ ] Other:    Assessment of Catheter Site:		[ ] Intact		[ ] Other:    ASSESSMENT/PLAN  [ ] Continue current therapy    [ ] Therapy changed to:    [ ] IV PCA       [ ] Epidural     [x ] prn Analgesics     Comments:

## 2019-05-16 NOTE — DISCHARGE NOTE PROVIDER - NSDCFUADDINST_GEN_ALL_CORE_FT
dressing is water resistent but still keep direct stream of water away from the dressing  no swimming or bathing where wound is exposed to water for long periods of time

## 2019-05-16 NOTE — DISCHARGE NOTE PROVIDER - NSDCACTIVITY_GEN_ALL_CORE
No heavy lifting/straining/Do not drive or operate machinery/Do not make important decisions Walking - Outdoors allowed/Showering allowed/Walking - Indoors allowed/No heavy lifting/straining/Do not drive or operate machinery/Stairs allowed/Do not make important decisions

## 2019-05-16 NOTE — PROGRESS NOTE ADULT - SUBJECTIVE AND OBJECTIVE BOX
Patient is seen and examined at bedside. Denies any CP / SOB / DIzziness / N /V /D/ HA. No significant overnight events. Pain well controlled at moment.      Vital Signs Last 24 Hrs  T(C): 36.7 (16 May 2019 05:48), Max: 37 (15 May 2019 22:17)  T(F): 98.1 (16 May 2019 05:48), Max: 98.6 (15 May 2019 22:17)  HR: 78 (16 May 2019 05:48) (64 - 78)  BP: 114/48 (16 May 2019 05:48) (106/51 - 178/73)  BP(mean): 76 (15 May 2019 12:00) (47 - 83)  RR: 14 (16 May 2019 05:48) (14 - 19)  SpO2: 94% (16 May 2019 05:48) (93% - 100%)    Gen: NAD    LLE: Dressing C/D/I.   LLE: Motor intact EHL/FHL/ TA/ GS. Sensation is grossly intact to light touch in the distal extremities. Compartments are soft BL. Extremities are warm    Labs:                        12.5   16.70 )-----------( 236      ( 15 May 2019 11:47 )             39.2     05-15    140  |  102  |  20  ----------------------------<  142<H>  3.3<L>   |  28  |  0.70    Ca    8.9      15 May 2019 11:47        A/P: Patient is a 80 y/o Female s/p L Total Hip Arthroplasty, POD # 1  - Pain control / Analgesia  - Inc Spirometry   - Venodynes  - DVT Prophylaxis ASA BID  - No tramadol  - FU AM Labs  - PT / OT  - WBAT / OOB / Total hip precautions  - Notify ortho with questions

## 2019-05-16 NOTE — CONSULT NOTE ADULT - SUBJECTIVE AND OBJECTIVE BOX
CHIEF COMPLAINT: Patient is a 79y old  Female who presents with a chief complaint of "I am having left hip surgery" (30 Apr 2019 09:21)      HPI:  Ms. Edward is an 78 yo woman with hx of HTN, HLD, hx of Graves disease s/p total thyroidectomy with postsurgical hypothyroidism, Parkinson's disease and osteoarthritis of left hip admitted for scheduled left total hip replacement via direct anterior approach. Patient is now POD#1        Allergies  No Known Allergies    HOME MEDICATIONS: [x] Reviewed    MEDICATIONS  (STANDING):  acetaminophen   Tablet .. 650 milliGRAM(s) Oral every 6 hours  aspirin enteric coated 325 milliGRAM(s) Oral two times a day  atorvastatin 40 milliGRAM(s) Oral at bedtime  carbidopa/levodopa  25/100 1 Tablet(s) Oral three times a day  docusate sodium 100 milliGRAM(s) Oral three times a day  gabapentin 100 milliGRAM(s) Oral three times a day  lactated ringers. 1000 milliLiter(s) (100 mL/Hr) IV Continuous <Continuous>  levothyroxine 100 MICROGram(s) Oral daily  pantoprazole    Tablet 40 milliGRAM(s) Oral before breakfast  polyethylene glycol 3350 17 Gram(s) Oral daily  rasagiline Tablet 1 milliGRAM(s) Oral daily  senna 2 Tablet(s) Oral at bedtime    MEDICATIONS  (PRN):  acetaminophen   Tablet .. 650 milliGRAM(s) Oral every 6 hours PRN Temp greater or equal to 38.5C (101.3F)  aluminum hydroxide/magnesium hydroxide/simethicone Suspension 30 milliLiter(s) Oral four times a day PRN Indigestion  HYDROmorphone  Injectable 0.5 milliGRAM(s) IV Push every 4 hours PRN breakthrough pain  HYDROmorphone  Injectable 1 milliGRAM(s) IV Push every 3 hours PRN Severe Pain (7 - 10)  naloxone Injectable 0.1 milliGRAM(s) IV Push every 3 minutes PRN For ANY of the following changes in patient status:  A. RR LESS THAN 10 breaths per minute, B. Oxygen saturation LESS THAN 90%, C. Sedation score of 6  ondansetron Injectable 4 milliGRAM(s) IV Push every 6 hours PRN Nausea and/or Vomiting  oxyCODONE    IR 5 milliGRAM(s) Oral every 3 hours PRN Mild Pain (1 - 3)  oxyCODONE    IR 10 milliGRAM(s) Oral every 3 hours PRN Moderate Pain (4 - 6)      PAST MEDICAL & SURGICAL HISTORY:  H/O Graves' disease  MVP (mitral valve prolapse): -no cardiology  Edema of lower extremity: seen by PMD doppler done -as per patient normal  MARK positive  Spondylolisthesis of lumbar region: s/p lumbar laminectomy with fusion in 3/2019  Hypothyroidism  Osteoporosis  HLD (hyperlipidemia)  Essential hypertension  Parkinson's disease: dx 2012  S/P lumbar laminectomy: with fusion in 3/2019  H/O colonoscopy: 2018  History of tonsillectomy: childhood  History of shoulder surgery: Right- long time ago  History of thyroidectomy, total: 1992  [x ] Reviewed     SOCIAL HISTORY:  [x] Substance abuse:   [x] Alcohol use:   [x] Tobacco:     FAMILY HISTORY:  FH: heart disease: sister  FH: lung cancer: sister  Family history of cerebral hemorrhage: mother  FH: kidney cancer: father  [x] No pertinent family history in first degree relatives     REVIEW OF SYSTEMS:  [x] All other ROS negative  [  ] Unable to obtain due to poor mental status    Vital Signs Last 24 Hrs  T(C): 36.7 (16 May 2019 14:45), Max: 37 (15 May 2019 22:17)  T(F): 98 (16 May 2019 14:45), Max: 98.6 (15 May 2019 22:17)  HR: 82 (16 May 2019 14:45) (73 - 84)  BP: 139/51 (16 May 2019 14:45) (106/51 - 145/61)  BP(mean): --  RR: 16 (16 May 2019 14:45) (14 - 16)  SpO2: 99% (16 May 2019 14:45) (93% - 99%)    PHYSICAL EXAM:  GENERAL: NAD, well-groomed, well-developed  HEAD:  Atraumatic, Normocephalic  EYES: EOMI, PERRLA, conjunctiva and sclera clear  ENMT: Moist mucous membranes  NECK: Supple, No JVD  RESPIRATORY: Clear to auscultation bilaterally; No rales, rhonchi, wheezing, or rubs  CARDIOVASCULAR: Regular rate and rhythm; No murmurs, rubs, or gallops  GASTROINTESTINAL: Soft, Nontender, Nondistended; Bowel sounds present  GENITOURINARY: Not examined  EXTREMITIES:  2+ Peripheral Pulses, No clubbing, cyanosis, or edema  NERVOUS SYSTEM:  Alert & Oriented X3; Moving all 4 extremities; No gross sensory deficits  HEME/LYMPH: No lymphadenopathy noted  SKIN: No rashes or lesions; Incisions C/D/I      LABS:                        9.6    12.96 )-----------( 214      ( 16 May 2019 06:03 )             30.5     05-16    139  |  106  |  19  ----------------------------<  131<H>  4.3   |  22  |  0.64    Ca    8.5      16 May 2019 06:03      Culture - Surg Site Aerob/Anaer w/Gm St (collected 15 May 2019 13:38)  Source: HIP  Preliminary Report (16 May 2019 08:08):    NO GROWTH - PRELIMINARY RESULTS                  [ ] Consultant(s) Notes Reviewed  [x] Care Discussed with Consultants/Other Providers: Ortho PA - discussed CHIEF COMPLAINT: Patient is a 79y old  Female who presents with a chief complaint of "I am having left hip surgery" (30 Apr 2019 09:21)      HPI:  Ms. Edward is an 80 yo woman with hx of HTN, HLD, hx of Graves disease s/p total thyroidectomy with postsurgical hypothyroidism, Parkinson's disease and osteoarthritis of left hip admitted for scheduled left total hip replacement via direct anterior approach. Patient is now POD#1. Pain is well controlled. Patient able to ambulate. She denies fever, chills, chest pain, SOB, n/v or abdominal pain. Patient without focal numbness or weakness.       Allergies  No Known Allergies    HOME MEDICATIONS: [x] Reviewed    MEDICATIONS  (STANDING):  acetaminophen   Tablet .. 650 milliGRAM(s) Oral every 6 hours  aspirin enteric coated 325 milliGRAM(s) Oral two times a day  atorvastatin 40 milliGRAM(s) Oral at bedtime  carbidopa/levodopa  25/100 1 Tablet(s) Oral three times a day  docusate sodium 100 milliGRAM(s) Oral three times a day  gabapentin 100 milliGRAM(s) Oral three times a day  lactated ringers. 1000 milliLiter(s) (100 mL/Hr) IV Continuous <Continuous>  levothyroxine 100 MICROGram(s) Oral daily  pantoprazole    Tablet 40 milliGRAM(s) Oral before breakfast  polyethylene glycol 3350 17 Gram(s) Oral daily  rasagiline Tablet 1 milliGRAM(s) Oral daily  senna 2 Tablet(s) Oral at bedtime    MEDICATIONS  (PRN):  acetaminophen   Tablet .. 650 milliGRAM(s) Oral every 6 hours PRN Temp greater or equal to 38.5C (101.3F)  aluminum hydroxide/magnesium hydroxide/simethicone Suspension 30 milliLiter(s) Oral four times a day PRN Indigestion  HYDROmorphone  Injectable 0.5 milliGRAM(s) IV Push every 4 hours PRN breakthrough pain  HYDROmorphone  Injectable 1 milliGRAM(s) IV Push every 3 hours PRN Severe Pain (7 - 10)  naloxone Injectable 0.1 milliGRAM(s) IV Push every 3 minutes PRN For ANY of the following changes in patient status:  A. RR LESS THAN 10 breaths per minute, B. Oxygen saturation LESS THAN 90%, C. Sedation score of 6  ondansetron Injectable 4 milliGRAM(s) IV Push every 6 hours PRN Nausea and/or Vomiting  oxyCODONE    IR 5 milliGRAM(s) Oral every 3 hours PRN Mild Pain (1 - 3)  oxyCODONE    IR 10 milliGRAM(s) Oral every 3 hours PRN Moderate Pain (4 - 6)      PAST MEDICAL & SURGICAL HISTORY:  H/O Graves' disease  MVP (mitral valve prolapse): -no cardiology  Edema of lower extremity: seen by PMD doppler done -as per patient normal  MARK positive  Spondylolisthesis of lumbar region: s/p lumbar laminectomy with fusion in 3/2019  Hypothyroidism  Osteoporosis  HLD (hyperlipidemia)  Essential hypertension  Parkinson's disease: dx 2012  S/P lumbar laminectomy: with fusion in 3/2019  H/O colonoscopy: 2018  History of tonsillectomy: childhood  History of shoulder surgery: Right- long time ago  History of thyroidectomy, total: 1992  [x ] Reviewed     SOCIAL HISTORY:  [x] Substance abuse:   [x] Alcohol use:   [x] Tobacco:     FAMILY HISTORY:  FH: heart disease: sister  FH: lung cancer: sister  Family history of cerebral hemorrhage: mother  FH: kidney cancer: father  [x] No pertinent family history in first degree relatives     REVIEW OF SYSTEMS:  [x] All other ROS negative  [  ] Unable to obtain due to poor mental status    Vital Signs Last 24 Hrs  T(C): 36.7 (16 May 2019 14:45), Max: 37 (15 May 2019 22:17)  T(F): 98 (16 May 2019 14:45), Max: 98.6 (15 May 2019 22:17)  HR: 82 (16 May 2019 14:45) (73 - 84)  BP: 139/51 (16 May 2019 14:45) (106/51 - 145/61)  BP(mean): --  RR: 16 (16 May 2019 14:45) (14 - 16)  SpO2: 99% (16 May 2019 14:45) (93% - 99%)    PHYSICAL EXAM:  GENERAL: NAD, well-groomed, well-developed  HEAD:  Atraumatic, Normocephalic  EYES: EOMI, PERRLA, conjunctiva and sclera clear  ENMT: Moist mucous membranes  NECK: Supple, No JVD  RESPIRATORY: Clear to auscultation bilaterally; No rales, rhonchi, wheezing, or rubs  CARDIOVASCULAR: Regular rate and rhythm; No murmurs, rubs, or gallops  GASTROINTESTINAL: Soft, Nontender, Nondistended; Bowel sounds present  GENITOURINARY: Not examined  EXTREMITIES:  2+ Peripheral Pulses, No clubbing, cyanosis, or edema  NERVOUS SYSTEM:  Alert & Oriented X3; Moving all 4 extremities; No gross sensory deficits  HEME/LYMPH: No lymphadenopathy noted  SKIN: No rashes or lesions; Incisions C/D/I      LABS:                        9.6    12.96 )-----------( 214      ( 16 May 2019 06:03 )             30.5     05-16    139  |  106  |  19  ----------------------------<  131<H>  4.3   |  22  |  0.64    Ca    8.5      16 May 2019 06:03      Culture - Surg Site Aerob/Anaer w/Gm St (collected 15 May 2019 13:38)  Source: HIP  Preliminary Report (16 May 2019 08:08):    NO GROWTH - PRELIMINARY RESULTS                  [ ] Consultant(s) Notes Reviewed  [x] Care Discussed with Consultants/Other Providers: Ortho PA - discussed

## 2019-05-16 NOTE — DISCHARGE NOTE PROVIDER - CARE PROVIDER_API CALL
Bran Yu)  Orthopaedic Surgery  [address] [address], [address]  [place] [address]  Phone: [phone]  Fax: [phone]  Follow Up Time:

## 2019-05-16 NOTE — PROGRESS NOTE ADULT - SUBJECTIVE AND OBJECTIVE BOX
Anesthesia Pain Management Service    SUBJECTIVE: Patient s/p spinal morphine with pain managable and no problems.  Pain Scale Score:  Refer to charted pain scores    THERAPY:    s/p spinal PF morphine yesterday.      MEDICATIONS  (STANDING):  acetaminophen   Tablet .. 650 milliGRAM(s) Oral every 6 hours  aspirin enteric coated 325 milliGRAM(s) Oral two times a day  atorvastatin 40 milliGRAM(s) Oral at bedtime  carbidopa/levodopa  25/100 1 Tablet(s) Oral three times a day  docusate sodium 100 milliGRAM(s) Oral three times a day  gabapentin 100 milliGRAM(s) Oral three times a day  ketorolac   Injectable 15 milliGRAM(s) IV Push every 8 hours  lactated ringers. 1000 milliLiter(s) (100 mL/Hr) IV Continuous <Continuous>  levothyroxine 100 MICROGram(s) Oral daily  morphine PF Spinal 0.1 milliGRAM(s) IntraThecal. once  pantoprazole    Tablet 40 milliGRAM(s) Oral before breakfast  polyethylene glycol 3350 17 Gram(s) Oral daily  rasagiline Tablet 1 milliGRAM(s) Oral daily  senna 2 Tablet(s) Oral at bedtime    MEDICATIONS  (PRN):  acetaminophen   Tablet .. 650 milliGRAM(s) Oral every 6 hours PRN Temp greater or equal to 38.5C (101.3F)  aluminum hydroxide/magnesium hydroxide/simethicone Suspension 30 milliLiter(s) Oral four times a day PRN Indigestion  HYDROmorphone  Injectable 0.5 milliGRAM(s) IV Push every 4 hours PRN breakthrough pain  HYDROmorphone  Injectable 1 milliGRAM(s) IV Push every 3 hours PRN Severe Pain (7 - 10)  naloxone Injectable 0.1 milliGRAM(s) IV Push every 3 minutes PRN For ANY of the following changes in patient status:  A. RR LESS THAN 10 breaths per minute, B. Oxygen saturation LESS THAN 90%, C. Sedation score of 6  ondansetron Injectable 4 milliGRAM(s) IV Push every 6 hours PRN Nausea and/or Vomiting  oxyCODONE    IR 5 milliGRAM(s) Oral every 3 hours PRN Mild Pain (1 - 3)  oxyCODONE    IR 10 milliGRAM(s) Oral every 3 hours PRN Moderate Pain (4 - 6)      OBJECTIVE: sitting in chair     Sedation Score:	[ x] Alert	[ ] Drowsy	[ ] Arousable	[ ] Asleep	[ ] Unresponsive    Side Effects:	[ x] None	[ ] Nausea	[ ] Vomiting	[ ] Pruritus  		  [ ] Weakness		[ ] Numbness	[ ] Other:    Vital Signs Last 24 Hrs  T(C): 36.7 (16 May 2019 05:48), Max: 37 (15 May 2019 22:17)  T(F): 98.1 (16 May 2019 05:48), Max: 98.6 (15 May 2019 22:17)  HR: 78 (16 May 2019 05:48) (64 - 78)  BP: 114/48 (16 May 2019 05:48) (106/51 - 143/71)  BP(mean): 76 (15 May 2019 12:00) (47 - 83)  RR: 14 (16 May 2019 05:48) (14 - 19)  SpO2: 94% (16 May 2019 05:48) (93% - 100%)    ASSESSMENT/ PLAN  [x ] Patient transitioned to prn analgesics  [x] Pain management per primary service, pain service to sign off   [x]Documentation and Verification of current medications     Comments: PRN opioids or adjuvant medication to be given.

## 2019-05-16 NOTE — DISCHARGE NOTE PROVIDER - NSDCCPTREATMENT_GEN_ALL_CORE_FT
PRINCIPAL PROCEDURE  Procedure: Left total hip arthroplasty  Findings and Treatment: dictated operative note  pain control, pain med may cause drowsiness, refrain from activities require decision making, physical therapy to help resume activities of daily living  weight bearing as tolerated to left leg  TOTAL HIP PRECAUTIONS ANTERIOR  Office appointment is already made (see card attached to discharge folder) so if you need to reschedule please call Dr. Yu's office [phone]

## 2019-05-17 ENCOUNTER — TRANSCRIPTION ENCOUNTER (OUTPATIENT)
Age: 79
End: 2019-05-17

## 2019-05-17 VITALS
OXYGEN SATURATION: 97 % | HEART RATE: 77 BPM | TEMPERATURE: 98 F | DIASTOLIC BLOOD PRESSURE: 58 MMHG | SYSTOLIC BLOOD PRESSURE: 121 MMHG | RESPIRATION RATE: 17 BRPM

## 2019-05-17 PROCEDURE — 99233 SBSQ HOSP IP/OBS HIGH 50: CPT

## 2019-05-17 RX ADMIN — Medication 325 MILLIGRAM(S): at 05:57

## 2019-05-17 RX ADMIN — CARBIDOPA AND LEVODOPA 1 TABLET(S): 25; 100 TABLET ORAL at 05:57

## 2019-05-17 RX ADMIN — OXYCODONE HYDROCHLORIDE 10 MILLIGRAM(S): 5 TABLET ORAL at 10:25

## 2019-05-17 RX ADMIN — OXYCODONE HYDROCHLORIDE 10 MILLIGRAM(S): 5 TABLET ORAL at 09:30

## 2019-05-17 RX ADMIN — Medication 650 MILLIGRAM(S): at 12:37

## 2019-05-17 RX ADMIN — RASAGILINE 1 MILLIGRAM(S): 0.5 TABLET ORAL at 12:36

## 2019-05-17 RX ADMIN — Medication 650 MILLIGRAM(S): at 05:58

## 2019-05-17 RX ADMIN — OXYCODONE HYDROCHLORIDE 5 MILLIGRAM(S): 5 TABLET ORAL at 06:03

## 2019-05-17 RX ADMIN — OXYCODONE HYDROCHLORIDE 5 MILLIGRAM(S): 5 TABLET ORAL at 05:32

## 2019-05-17 RX ADMIN — PANTOPRAZOLE SODIUM 40 MILLIGRAM(S): 20 TABLET, DELAYED RELEASE ORAL at 05:57

## 2019-05-17 RX ADMIN — Medication 100 MICROGRAM(S): at 05:57

## 2019-05-17 RX ADMIN — GABAPENTIN 100 MILLIGRAM(S): 400 CAPSULE ORAL at 05:57

## 2019-05-17 NOTE — DISCHARGE NOTE NURSING/CASE MANAGEMENT/SOCIAL WORK - NSDCPECAREGIVERED_GEN_ALL_CORE
[place] on anterior total hip, precautions worksheet, exercise worksheet, side effects pamphlet, managing pain at home worksheet, [place] on d/c medications

## 2019-05-17 NOTE — DISCHARGE NOTE NURSING/CASE MANAGEMENT/SOCIAL WORK - NSDCDPATPORTLINK_GEN_ALL_CORE
You can access the [facility] Portal, offered by [facility] [place], by registering with the following website: [email or web address]

## 2019-05-17 NOTE — PROGRESS NOTE ADULT - SUBJECTIVE AND OBJECTIVE BOX
Orthopaedic Surgery Progress Note    Subjective:   Patient seen and examined  No acute events overnight  Walked 75 ft yesterday  Denies cp/sob/f/c    Objective:  T(C): 36.6 (05-17-19 @ 05:55), Max: 36.7 (05-16-19 @ 12:41)  HR: 75 (05-17-19 @ 05:55) (75 - 84)  BP: 151/67 (05-17-19 @ 05:55) (135/49 - 151/67)  RR: 16 (05-17-19 @ 05:55) (16 - 16)  SpO2: 96% (05-17-19 @ 05:55) (94% - 99%)  Wt(kg): --    05-15 @ 07:01  -  05-16 @ 07:00  --------------------------------------------------------  IN: 350 mL / OUT: 850 mL / NET: -500 mL        PE    NAD  LLE:   dressing C/D/I  motor intact GS/TA/EHL  SILT S/S/SP/DP  WWP                          9.6    12.96 )-----------( 214      ( 16 May 2019 06:03 )             30.5     05-16    139  |  106  |  19  ----------------------------<  131<H>  4.3   |  22  |  0.64    Ca    8.5      16 May 2019 06:03      79y Female s/p L FLORENTINO  - Pain control  - WBAT  - Anterior dislocation precautions  - PT/OT/OOB  - DVT ppx  - Dispo planning

## 2019-05-17 NOTE — PROGRESS NOTE ADULT - ASSESSMENT
Ms. Edward is an 78 yo woman with hx of HTN, HLD, hx of Graves disease s/p total thyroidectomy with postsurgical hypothyroidism, Parkinson's disease and osteoarthritis of left hip admitted for elective left total hip replacement done on 5/15/19.

## 2019-05-17 NOTE — PROGRESS NOTE ADULT - SUBJECTIVE AND OBJECTIVE BOX
CHIEF COMPLAINT: f/u     SUBJECTIVE / OVERNIGHT EVENTS:   Patient seen and examined. No acute events overnight. Pain well controlled and patient without any complaints. She has no chest pain, SOB, n/v or abdominal pain. Patient able to bend left leg better today compared to yesterday, but does mention some difficulty trying to adduct at the hip.    MEDICATIONS  (STANDING):  acetaminophen   Tablet .. 650 milliGRAM(s) Oral every 6 hours  aspirin enteric coated 325 milliGRAM(s) Oral two times a day  atorvastatin 40 milliGRAM(s) Oral at bedtime  carbidopa/levodopa  25/100 1 Tablet(s) Oral three times a day  docusate sodium 100 milliGRAM(s) Oral three times a day  gabapentin 100 milliGRAM(s) Oral three times a day  lactated ringers. 1000 milliLiter(s) (100 mL/Hr) IV Continuous <Continuous>  levothyroxine 100 MICROGram(s) Oral daily  pantoprazole    Tablet 40 milliGRAM(s) Oral before breakfast  polyethylene glycol 3350 17 Gram(s) Oral daily  rasagiline Tablet 1 milliGRAM(s) Oral daily  senna 2 Tablet(s) Oral at bedtime    MEDICATIONS  (PRN):  acetaminophen   Tablet .. 650 milliGRAM(s) Oral every 6 hours PRN Temp greater or equal to 38.5C (101.3F)  aluminum hydroxide/magnesium hydroxide/simethicone Suspension 30 milliLiter(s) Oral four times a day PRN Indigestion  bisacodyl Suppository 10 milliGRAM(s) Rectal daily PRN If no bowel movement by POD#2  HYDROmorphone  Injectable 0.5 milliGRAM(s) IV Push every 4 hours PRN breakthrough pain  HYDROmorphone  Injectable 1 milliGRAM(s) IV Push every 3 hours PRN Severe Pain (7 - 10)  naloxone Injectable 0.1 milliGRAM(s) IV Push every 3 minutes PRN For ANY of the following changes in patient status:  A. RR LESS THAN 10 breaths per minute, B. Oxygen saturation LESS THAN 90%, C. Sedation score of 6  ondansetron Injectable 4 milliGRAM(s) IV Push every 6 hours PRN Nausea and/or Vomiting  oxyCODONE    IR 5 milliGRAM(s) Oral every 3 hours PRN Mild Pain (1 - 3)  oxyCODONE    IR 10 milliGRAM(s) Oral every 3 hours PRN Moderate Pain (4 - 6)      VITALS:  T(F): 98.5 (05-17-19 @ 09:12), Max: 98.5 (05-17-19 @ 09:12)  HR: 77 (05-17-19 @ 09:12) (75 - 83)  BP: 121/58 (05-17-19 @ 09:12) (121/58 - 151/67)  RR: 17 (05-17-19 @ 09:12) (16 - 17)  SpO2: 97% (05-17-19 @ 09:12)      PHYSICAL EXAM:  GENERAL: thin framed woman in NAD, well-groomed, well-developed  RESPIRATORY: Clear to auscultation bilaterally; No rales, rhonchi, wheezing, or rubs  CARDIOVASCULAR: Regular rate and rhythm; No murmurs, rubs, or gallops  GASTROINTESTINAL: Soft, Nontender, Nondistended; Bowel sounds present  EXTREMITIES:  2+ Peripheral Pulses, No clubbing, cyanosis, or peripheral edema. Left lateral hip Aquacel dressing with minimally absorbed blood on padding.  NERVOUS SYSTEM:  Alert & Oriented X3; Moving all 4 extremities; No gross sensory deficits        LABS:              9.6                  139  | 22   | 19           12.96 >-----------< 214     ------------------------< 131                   30.5                 4.3  | 106  | 0.64                                         Ca 8.5   Mg x     Ph x            [ ] Consultant(s) Notes Reviewed:  [x] Care Discussed with Consultants/Other Providers: Orthopedic PA - discussed

## 2019-05-17 NOTE — PROGRESS NOTE ADULT - PROBLEM SELECTOR PLAN 5
- continue levothyroxine 100mcg daily.    DISPO: No objections to discharging patient today from a medical perspective. Patient is medically stable.

## 2019-05-17 NOTE — PROGRESS NOTE ADULT - PROBLEM SELECTOR PLAN 3
Pain controlled  - Tylenol 650mg q6h standing  - hydromorphone 0.5-1mg IV for breakthrough pain  - oxycodone PRN for severe /moderate pain  - daily bowel regimen to ensure adequate daily BMs.

## 2019-05-21 LAB
CULTURE - SURGICAL SITE: SIGNIFICANT CHANGE UP
SURGICAL PATHOLOGY STUDY: SIGNIFICANT CHANGE UP

## 2019-05-21 NOTE — DISCUSSION/SUMMARY
[Home] : patient was discharged to home [FreeTextEntry1] : Spoke with patient regarding her recent hospitalization to [facility] for a hip replacement from 5/15/19-5/17/2019.  The patient reports she is not feeling well.  She is c/o swelling to the effected leg and "blisters to her foot and ankle".  I did urge her to be seen but she declined.  She stated she will have the physical therapist look at it today and tell her what to do.  I also suggested she speak with her surgeon.  She told me she would take care of it and was unable to leave her home at this time.  She was advised to call for any issues or concerns.  She verbalized understanding of all.  Dr Boyle was made aware. [FreeTextEntry3] : Pt declined a transition of care appointment

## 2019-05-29 ENCOUNTER — APPOINTMENT (OUTPATIENT)
Dept: ORTHOPEDIC SURGERY | Facility: CLINIC | Age: 79
End: 2019-05-29
Payer: MEDICARE

## 2019-05-29 VITALS
SYSTOLIC BLOOD PRESSURE: 120 MMHG | DIASTOLIC BLOOD PRESSURE: 87 MMHG | HEART RATE: 84 BPM | BODY MASS INDEX: 24.8 KG/M2 | HEIGHT: 59 IN | WEIGHT: 123 LBS

## 2019-05-29 PROCEDURE — 99024 POSTOP FOLLOW-UP VISIT: CPT

## 2019-05-29 PROCEDURE — 73502 X-RAY EXAM HIP UNI 2-3 VIEWS: CPT | Mod: LT,TC

## 2019-05-29 NOTE — HISTORY OF PRESENT ILLNESS
[Clean/Dry/Intact] : clean, dry and intact [Healed] : healed [Vascular Intact] : ~T peripheral vascular exam normal [Neuro Intact] : an unremarkable neurological exam [Negative Mary Kay's] : maneuvers demonstrated a negative Mary Kay's sign [Excellent Pain Control] : has excellent pain control [Doing Well] : is doing well [No Sign of Infection] : is showing no signs of infection [Steri-Strips Removed & Replaced] : steri-strips removed and replaced [Erythema] : not erythematous [Discharge] : absent of discharge [Swelling] : not swollen [Dehiscence] : not dehisced [de-identified] : status post left total hip replacement 5/15/19 [de-identified] : She is doing well s/p left total hip replacement. She is participating in home physical therapy, as well as a home exercise program daily. She is taking no medication for pain and pain level is controlled. She is taking aspirin for DVT ppx. She admits to a few blisters on her right foot and ankle which were draining fluid a few days ago. She has been using alcohol and covering them with a gauze dressing.  [de-identified] : AP and False Profile Radiographs of the left hip and pelvis taken today reveal a well fixed and aligned left total hip replacement with no signs of mechanical failure or periprosthetic fracture.\par   [de-identified] : left hip: Walks with left stiff hip gait. The Mepilex was removed today and steri strips have been applied to the incision. There is a well healed scar of surgery with no significant swelling, redness, or tenderness. Range of motion of the hip: active SLR of 10 degrees and hip flexion to 90 degrees Abduction 30 degrees adduction 15 degrees external rotation 30 degrees internal rotation 15 degrees with hip abductor extensor power grade +4. Patient has some mild swelling and bruising for the lower extremity, as well as two blisters on the medial malleoli and two smaller blisters on the dorsal toes.  [de-identified] : The patient was informed of the findings and recommended conservative management in the form of a home exercise program, activity modifications, stationary bicycling, and ambulation with a cane.  A prescription for a course of physical therapy was provided. She will take aspirin for DVT prophylaxis for another four weeks.  The risks, benefits, and side effects were discussed. As for the blisters of the medial malleoli and right foot, patient has been instructed to clean both areas with alcohol twice daily and keep them covered with a dressing. I have sent a prophylactic course of Levaquin to patients pharmacy for one week. Follow up appointment was recommended in one week with [facility]JR to monitor blisters and swelling of the right lower extremity.

## 2019-05-30 ENCOUNTER — APPOINTMENT (OUTPATIENT)
Dept: ORTHOPEDIC SURGERY | Facility: CLINIC | Age: 79
End: 2019-05-30
Payer: MEDICARE

## 2019-05-30 PROCEDURE — 72100 X-RAY EXAM L-S SPINE 2/3 VWS: CPT

## 2019-05-30 PROCEDURE — 99024 POSTOP FOLLOW-UP VISIT: CPT

## 2019-05-30 NOTE — HISTORY OF PRESENT ILLNESS
[Clean/Dry/Intact] : clean, dry and intact [Bony Fusion] : bony fusion [Excellent Pain Control] : has excellent pain control [Doing Well] : is doing well [No Sign of Infection] : is showing no signs of infection [No ADLs] : to avoid most activities of daily living [No Work] : not to work [No Housework] : not to do housework [___ Months Post Op] : [unfilled] months post op [Xray (Date:___)] : [unfilled] Xray -  [Neuro Intact] : an unremarkable neurological exam [Swelling] : not swollen [de-identified] : s/p posterior lumbar laminectomy L3-L5  and fusion L4-L5 on 03/01/19 [de-identified] : Patient is doing ok overall, Pt is not taking meds for pain. She denies any numbness or tingling on B/L LE. Pt had Left hip replacement on 04/2019.  Preop pain improved.  She ambulates with a cane. She has mild pain on LLE when walking . Pt stopped using lumbar brace [de-identified] : Brace , PT, home care,  RTO 4 weeks. Left hip and thigh pain is from OA of left hip.

## 2019-05-31 ENCOUNTER — TRANSCRIPTION ENCOUNTER (OUTPATIENT)
Age: 79
End: 2019-05-31

## 2019-06-06 ENCOUNTER — APPOINTMENT (OUTPATIENT)
Dept: ORTHOPEDIC SURGERY | Facility: CLINIC | Age: 79
End: 2019-06-06
Payer: MEDICARE

## 2019-06-06 VITALS — HEART RATE: 88 BPM | DIASTOLIC BLOOD PRESSURE: 73 MMHG | SYSTOLIC BLOOD PRESSURE: 146 MMHG

## 2019-06-06 PROCEDURE — 99024 POSTOP FOLLOW-UP VISIT: CPT

## 2019-06-06 NOTE — HISTORY OF PRESENT ILLNESS
[Clean/Dry/Intact] : clean, dry and intact [Healed] : healed [Negative Mary Kay's] : maneuvers demonstrated a negative Mary Kay's sign [Vascular Intact] : ~T peripheral vascular exam normal [Neuro Intact] : an unremarkable neurological exam [Excellent Pain Control] : has excellent pain control [No Sign of Infection] : is showing no signs of infection [Doing Well] : is doing well [Steri-Strips Removed & Replaced] : steri-strips removed and replaced [Erythema] : not erythematous [Discharge] : absent of discharge [Swelling] : not swollen [Dehiscence] : not dehisced [de-identified] : status post left total hip replacement 5/15/19 [de-identified] : She is doing well s/p left total hip replacement. She is participating in home physical therapy, as well as a home exercise program daily. She is taking no medication for pain and pain level is controlled. She is taking aspirin for DVT ppx. She presents today for skin check, as she had blisters along the ankle of the LLE. She notes they are now healed  [de-identified] : AP and False Profile Radiographs of the left hip and pelvis taken last visit reveal a well fixed and aligned left total hip replacement with no signs of mechanical failure or periprosthetic fracture.\par   [de-identified] : left hip: Walks with left stiff hip gait. There is a well healed scar of surgery with no significant swelling, redness, or tenderness. Range of motion of the hip: active SLR of 10 degrees and hip flexion to 90 degrees Abduction 30 degrees adduction 15 degrees external rotation 30 degrees internal rotation 15 degrees with hip abductor extensor power grade +4. Blisters along the left ankle are healing.  [de-identified] : The patient was informed of the findings and recommended conservative management in the form of a home exercise program, activity modifications, stationary bicycling, and ambulation with a cane.  A prescription for a course of physical therapy was provided for outpatient therapy. She will take aspirin for DVT prophylaxis for another 3 weeks.  The risks, benefits, and side effects were discussed. As for the blisters of the medial malleoli and right foot, patient has been instructed to clean both areas with alcohol twice daily. She has been advised to leave the scabbing in place and let it fall off on its own. \par She has been instructed to ambulate with a walker, as she still remains a fall risk. She will continue this for another month and follow up for additional evaluation

## 2019-06-18 ENCOUNTER — APPOINTMENT (OUTPATIENT)
Dept: ENDOCRINOLOGY | Facility: CLINIC | Age: 79
End: 2019-06-18
Payer: MEDICARE

## 2019-06-18 VITALS — SYSTOLIC BLOOD PRESSURE: 130 MMHG | DIASTOLIC BLOOD PRESSURE: 70 MMHG | OXYGEN SATURATION: 97 % | HEART RATE: 80 BPM

## 2019-06-18 VITALS — HEIGHT: 58 IN | BODY MASS INDEX: 24.77 KG/M2 | WEIGHT: 118 LBS

## 2019-06-18 PROCEDURE — ZZZZZ: CPT

## 2019-06-18 PROCEDURE — 99214 OFFICE O/P EST MOD 30 MIN: CPT | Mod: 25

## 2019-06-18 PROCEDURE — 77080 DXA BONE DENSITY AXIAL: CPT

## 2019-06-18 NOTE — REVIEW OF SYSTEMS
[Fatigue] : fatigue [Constipation] : constipation [Myalgia] : myalgia  [Poor Balance] : poor balance [Negative] : Psychiatric [Lower Ext Edema] : lower extremity edema [Swelling] : swelling [Recent Weight Gain (___ Lbs)] : no recent weight gain [Recent Weight Loss (___ Lbs)] : no recent weight loss [Chest Pain] : no chest pain [Palpitations] : no palpitations [Heartburn] : no heartburn [Joint Pain] : no joint pain [Hair Loss] : no hair loss [Pain/Numbness of Digits] : no pain/numbness of digits [Depression] : no depression [Polydipsia] : no polydipsia [Cold Intolerance] : cold tolerant [Heat Intolerance] : heat tolerant [FreeTextEntry8] : urinary urgency

## 2019-06-18 NOTE — HISTORY OF PRESENT ILLNESS
[FreeTextEntry1] : 79 y.o. female with h/o osteopenia and hypothyroidism s/p surgery for Graves disease and nodules here for follow up visit. H/o left ankle fracture in January 2015. Had posterior lumbar laminectomy L3-L5 and fusion of L4-L5 on 3/1/19. Also had left hip replacement on 5/5/19. Doing much better now.  Does have falls given Parkinson's disease but nothing recently. Was taking Alendronate 70 mg weekly since June 2015 but stopped it in February 2019 prior to surgery. No dental issues and sees dentist. Takes calcium 500 mg daily and vitamin D plus yogurt and leafy greens. DEXA scan in May 2015 showed spine -1.4, femoral neck -2.4 and forearm -2.2. DEXA scan in June 2017 shows stability with spine -1.6, left femoral neck -2.2, total hip -1.8 and 1/3 radius -1.6. Doing PT now. Also has orthostatic hypotension and being monitored. \par \par In regards to thyroid disease,  taking LT4 100 mcg 6 days per week and 1/2 tab once weekly. No neck complaints.

## 2019-06-18 NOTE — DATA REVIEWED
[FreeTextEntry1] : Labs\par 5/9/17\par glucose 86\par BUn/cr 25/0.86\par calcium 9.4\par chol 210 HDL 97 LDL 99 tri 113\par TSH 0.97\par CBC normal\par 25 vitamin D 32\par

## 2019-06-18 NOTE — PHYSICAL EXAM
[Alert] : alert [No Acute Distress] : no acute distress [Normal Sclera/Conjunctiva] : normal sclera/conjunctiva [EOMI] : extra ocular movement intact [Supple] : the neck was supple [No LAD] : no lymphadenopathy [Well Healed Scar] : well healed scar [No Accessory Muscle Use] : no accessory muscle use [Clear to Auscultation] : lungs were clear to auscultation bilaterally [Normal S1, S2] : normal S1 and S2 [Regular Rhythm] : with a regular rhythm [Normal Bowel Sounds] : normal bowel sounds [Not Tender] : non-tender [Soft] : abdomen soft [Not Distended] : not distended [Normal] : normal and non tender [No Clubbing, Cyanosis] : no clubbing  or cyanosis of the fingernails [No Rash] : no rash [Normal Reflexes] : deep tendon reflexes were 2+ and symmetric [Normal Affect] : the affect was normal [Normal Mood] : the mood was normal [Kyphosis] : no kyphosis present [de-identified] : b/l edema

## 2019-07-05 ENCOUNTER — APPOINTMENT (OUTPATIENT)
Dept: ORTHOPEDIC SURGERY | Facility: CLINIC | Age: 79
End: 2019-07-05
Payer: MEDICARE

## 2019-07-05 VITALS
SYSTOLIC BLOOD PRESSURE: 147 MMHG | BODY MASS INDEX: 24.77 KG/M2 | HEART RATE: 75 BPM | DIASTOLIC BLOOD PRESSURE: 75 MMHG | HEIGHT: 58 IN | WEIGHT: 118 LBS

## 2019-07-05 PROCEDURE — 72100 X-RAY EXAM L-S SPINE 2/3 VWS: CPT

## 2019-07-05 PROCEDURE — 99214 OFFICE O/P EST MOD 30 MIN: CPT | Mod: 24

## 2019-07-18 ENCOUNTER — APPOINTMENT (OUTPATIENT)
Dept: ORTHOPEDIC SURGERY | Facility: CLINIC | Age: 79
End: 2019-07-18
Payer: MEDICARE

## 2019-07-18 VITALS
BODY MASS INDEX: 24.77 KG/M2 | DIASTOLIC BLOOD PRESSURE: 77 MMHG | WEIGHT: 118 LBS | HEART RATE: 76 BPM | HEIGHT: 58 IN | SYSTOLIC BLOOD PRESSURE: 133 MMHG

## 2019-07-18 PROCEDURE — 99024 POSTOP FOLLOW-UP VISIT: CPT

## 2019-07-18 NOTE — CONSULT LETTER
[Dear  ___] : Dear  [unfilled], [I had the pleasure of evaluating your patient, [unfilled].] : I had the pleasure of evaluating your patient, [unfilled]. [Please see my note below.] : Please see my note below. [Sincerely,] : Sincerely, [FreeTextEntry2] : LORI HOPPER\par  [FreeTextEntry3] : Bran Yu MD\par \par ________________________________________________\par [facility] : Hip/Knee Arthroplasty\par [address], [place] [address]\par (T) [phone]\par (F) [phone]

## 2019-07-18 NOTE — HISTORY OF PRESENT ILLNESS
[Clean/Dry/Intact] : clean, dry and intact [Healed] : healed [Neuro Intact] : an unremarkable neurological exam [Vascular Intact] : ~T peripheral vascular exam normal [Negative Mary Kay's] : maneuvers demonstrated a negative Mary Kay's sign [Doing Well] : is doing well [Excellent Pain Control] : has excellent pain control [No Sign of Infection] : is showing no signs of infection [Steri-Strips Removed & Replaced] : steri-strips removed and replaced [Chills] : no chills [Fever] : no fever [Erythema] : not erythematous [Discharge] : absent of discharge [Swelling] : not swollen [Dehiscence] : not dehisced [de-identified] : status post left total hip replacement 5/15/19 [de-identified] : She is doing well s/p left total hip replacement. She is participating in outpatient physical therapy, as well as a home exercise program daily. She is taking no medication for pain and pain level is controlled. She completed taking aspirin for DVT ppx. She notes the blisters on the ankles have healed.  [de-identified] : left hip: Walks with left stiff hip gait. There is a well healed scar of surgery with no significant swelling, redness, or tenderness. Range of motion of the hip: active SLR of 30 degrees and hip flexion to 90 degrees Abduction 30 degrees adduction 15 degrees external rotation 30 degrees internal rotation 15 degrees with hip abductor extensor power grade +4. Blisters along the left ankle are healed.   [de-identified] : AP and False Profile Radiographs of the left hip and pelvis taken last visit reveal a well fixed and aligned left total hip replacement with no signs of mechanical failure or periprosthetic fracture.\par   [de-identified] : The patient was informed of the findings and recommended conservative management in the form of a home exercise program, activity modifications, stationary bicycling, and ambulation with a cane.  A prescription for a course of physical therapy was provided for outpatient therapy. She no longer requires aspirin for DVT prophylaxis. The risks, benefits, and side effects were discussed. She has been instructed to ambulate with a cane.Follow up in 1 year.

## 2019-07-24 ENCOUNTER — APPOINTMENT (OUTPATIENT)
Dept: OPHTHALMOLOGY | Facility: CLINIC | Age: 79
End: 2019-07-24

## 2019-08-13 ENCOUNTER — FORM ENCOUNTER (OUTPATIENT)
Age: 79
End: 2019-08-13

## 2019-08-21 ENCOUNTER — APPOINTMENT (OUTPATIENT)
Dept: INTERNAL MEDICINE | Facility: CLINIC | Age: 79
End: 2019-08-21
Payer: MEDICARE

## 2019-08-21 ENCOUNTER — LABORATORY RESULT (OUTPATIENT)
Age: 79
End: 2019-08-21

## 2019-08-21 VITALS — SYSTOLIC BLOOD PRESSURE: 120 MMHG | DIASTOLIC BLOOD PRESSURE: 80 MMHG | HEART RATE: 60 BPM

## 2019-08-21 VITALS
OXYGEN SATURATION: 99 % | DIASTOLIC BLOOD PRESSURE: 80 MMHG | HEART RATE: 81 BPM | SYSTOLIC BLOOD PRESSURE: 144 MMHG | WEIGHT: 124 LBS | TEMPERATURE: 98.1 F | BODY MASS INDEX: 27.13 KG/M2 | HEIGHT: 56.5 IN

## 2019-08-21 VITALS — HEART RATE: 80 BPM | SYSTOLIC BLOOD PRESSURE: 144 MMHG | DIASTOLIC BLOOD PRESSURE: 80 MMHG

## 2019-08-21 DIAGNOSIS — R60.0 LOCALIZED EDEMA: ICD-10-CM

## 2019-08-21 DIAGNOSIS — M16.12 UNILATERAL PRIMARY OSTEOARTHRITIS, LEFT HIP: ICD-10-CM

## 2019-08-21 DIAGNOSIS — D64.9 ANEMIA, UNSPECIFIED: ICD-10-CM

## 2019-08-21 PROCEDURE — 36415 COLL VENOUS BLD VENIPUNCTURE: CPT

## 2019-08-21 PROCEDURE — 99214 OFFICE O/P EST MOD 30 MIN: CPT | Mod: 25

## 2019-08-21 RX ORDER — ACETAMINOPHEN AND CODEINE 300; 30 MG/1; MG/1
300-30 TABLET ORAL EVERY 8 HOURS
Qty: 90 | Refills: 0 | Status: DISCONTINUED | COMMUNITY
Start: 2019-02-08 | End: 2019-08-21

## 2019-08-21 RX ORDER — MUPIROCIN 20 MG/G
2 OINTMENT TOPICAL
Qty: 1 | Refills: 0 | Status: DISCONTINUED | COMMUNITY
Start: 2019-05-08 | End: 2019-08-21

## 2019-08-21 RX ORDER — AMOXICILLIN AND CLAVULANATE POTASSIUM 875; 125 MG/1; MG/1
875-125 TABLET, COATED ORAL
Qty: 14 | Refills: 0 | Status: DISCONTINUED | COMMUNITY
Start: 2019-05-08 | End: 2019-08-21

## 2019-08-21 RX ORDER — LEVOFLOXACIN 500 MG/1
500 TABLET, FILM COATED ORAL DAILY
Qty: 7 | Refills: 0 | Status: DISCONTINUED | COMMUNITY
Start: 2019-05-29 | End: 2019-08-21

## 2019-08-21 NOTE — ASSESSMENT
[FreeTextEntry1] : She is doing well 3 months post her left total hip replacement. She will continue with orthopedic followup. She has no evidence of active synovitis on exam despite her pains. Her Parkinson's seems in control and she will follow up with neurology. She remains orthostatic but her blood pressures are adequate and she will not resume Florinef. We reinforced adequate hydration. She is clinically euthyroid and  thyroid function tests were sent  as well as blood work for her lipids. With her history of prior blood transfusion CBC and iron levels were sent. She was advised to go for her second Shingrix vaccine as soon as possible. She'll be seen again in 4 months

## 2019-08-21 NOTE — PHYSICAL EXAM
[No Acute Distress] : no acute distress [Well Nourished] : well nourished [Well Developed] : well developed [Well-Appearing] : well-appearing [Normal Voice/Communication] : normal voice/communication [Normal Sclera/Conjunctiva] : normal sclera/conjunctiva [PERRL] : pupils equal round and reactive to light [EOMI] : extraocular movements intact [Normal Oropharynx] : the oropharynx was normal [Normal Outer Ear/Nose] : the outer ears and nose were normal in appearance [Normal TMs] : both tympanic membranes were normal [No JVD] : no jugular venous distention [No Lymphadenopathy] : no lymphadenopathy [Supple] : supple [Thyroid Normal, No Nodules] : the thyroid was normal and there were no nodules present [No Respiratory Distress] : no respiratory distress  [No Accessory Muscle Use] : no accessory muscle use [Clear to Auscultation] : lungs were clear to auscultation bilaterally [Normal Percussion] : the chest was normal to percussion [Normal Rate] : normal rate  [Regular Rhythm] : with a regular rhythm [Normal S1, S2] : normal S1 and S2 [No Murmur] : no murmur heard [No Carotid Bruits] : no carotid bruits [No Varicosities] : no varicosities [No Abdominal Bruit] : a ~M bruit was not heard ~T in the abdomen [Pedal Pulses Present] : the pedal pulses are present [No Palpable Aorta] : no palpable aorta [No Edema] : there was no peripheral edema [No Extremity Clubbing/Cyanosis] : no extremity clubbing/cyanosis [Soft] : abdomen soft [Non Tender] : non-tender [Non-distended] : non-distended [No Masses] : no abdominal mass palpated [No HSM] : no HSM [Normal Bowel Sounds] : normal bowel sounds [Normal Supraclavicular Nodes] : no supraclavicular lymphadenopathy [Normal Axillary Nodes] : no axillary lymphadenopathy [Normal Posterior Cervical Nodes] : no posterior cervical lymphadenopathy [Normal Anterior Cervical Nodes] : no anterior cervical lymphadenopathy [Normal Inguinal Nodes] : no inguinal lymphadenopathy [No CVA Tenderness] : no CVA  tenderness [No Joint Swelling] : no joint swelling [No Spinal Tenderness] : no spinal tenderness [Grossly Normal Strength/Tone] : grossly normal strength/tone [No Rash] : no rash [Coordination Grossly Intact] : coordination grossly intact [No Focal Deficits] : no focal deficits [Deep Tendon Reflexes (DTR)] : deep tendon reflexes were 2+ and symmetric [Speech Grossly Normal] : speech grossly normal [Memory Grossly Normal] : memory grossly normal [Normal Affect] : the affect was normal [Alert and Oriented x3] : oriented to person, place, and time [Normal Mood] : the mood was normal [Normal Insight/Judgement] : insight and judgment were intact [de-identified] : No joint swelling [de-identified] : Walking with a cane. Masked facies. No rigidity or tremor

## 2019-08-21 NOTE — HISTORY OF PRESENT ILLNESS
[FreeTextEntry1] : Orthostatic hypotension\par Parkinson's disease\par Hypothyroidism\par Status post left total hip replacement and lumbar spinal surgery [de-identified] : She is doing well 3 months post her left hip replacement. She states the surgery was uncomplicated except she needed a blood transfusion. She is still going for physical therapy. She still has some assistive devices to put her on an off her shoes and socks. She is driving and independent with all activities. Her right hip is a little achy and she can feel a little stiff and achy in her hands and legs when she first gets up in the morning. There have been no falls. She feels her Parkinson's is stable. She really can fail a little lightheaded when she stands up. She tries to stay well-hydrated she has no dysphagia her bowels are fine on lactulose She has no skin rash or joint swelling

## 2019-08-22 LAB
ALBUMIN SERPL ELPH-MCNC: 4.4 G/DL
ALP BLD-CCNC: 69 U/L
ALT SERPL-CCNC: 12 U/L
ANION GAP SERPL CALC-SCNC: 15 MMOL/L
AST SERPL-CCNC: 25 U/L
BASOPHILS # BLD AUTO: 0.08 K/UL
BASOPHILS NFR BLD AUTO: 1 %
BILIRUB SERPL-MCNC: 0.3 MG/DL
BUN SERPL-MCNC: 24 MG/DL
CALCIUM SERPL-MCNC: 9.7 MG/DL
CHLORIDE SERPL-SCNC: 100 MMOL/L
CHOLEST SERPL-MCNC: 204 MG/DL
CHOLEST/HDLC SERPL: 2 RATIO
CO2 SERPL-SCNC: 25 MMOL/L
CREAT SERPL-MCNC: 0.79 MG/DL
EOSINOPHIL # BLD AUTO: 0.07 K/UL
EOSINOPHIL NFR BLD AUTO: 0.8 %
FERRITIN SERPL-MCNC: 27 NG/ML
GLUCOSE SERPL-MCNC: 118 MG/DL
HCT VFR BLD CALC: 43.1 %
HDLC SERPL-MCNC: 103 MG/DL
HGB BLD-MCNC: 13.1 G/DL
IMM GRANULOCYTES NFR BLD AUTO: 0.4 %
IRON SATN MFR SERPL: 16 %
IRON SERPL-MCNC: 66 UG/DL
LDLC SERPL CALC-MCNC: 76 MG/DL
LYMPHOCYTES # BLD AUTO: 1.5 K/UL
LYMPHOCYTES NFR BLD AUTO: 18.2 %
MAN DIFF?: NORMAL
MCHC RBC-ENTMCNC: 25.9 PG
MCHC RBC-ENTMCNC: 30.4 GM/DL
MCV RBC AUTO: 85.2 FL
MONOCYTES # BLD AUTO: 0.7 K/UL
MONOCYTES NFR BLD AUTO: 8.5 %
NEUTROPHILS # BLD AUTO: 5.86 K/UL
NEUTROPHILS NFR BLD AUTO: 71.1 %
PLATELET # BLD AUTO: 261 K/UL
POTASSIUM SERPL-SCNC: 3.7 MMOL/L
PROT SERPL-MCNC: 7.3 G/DL
RBC # BLD: 5.06 M/UL
RBC # FLD: 20 %
SODIUM SERPL-SCNC: 140 MMOL/L
T3RU NFR SERPL: 0.9 TBI
T4 SERPL-MCNC: 7.7 UG/DL
TIBC SERPL-MCNC: 423 UG/DL
TRIGL SERPL-MCNC: 126 MG/DL
TSH SERPL-ACNC: 1.12 UIU/ML
UIBC SERPL-MCNC: 357 UG/DL
WBC # FLD AUTO: 8.24 K/UL

## 2019-09-05 ENCOUNTER — APPOINTMENT (OUTPATIENT)
Dept: ORTHOPEDIC SURGERY | Facility: CLINIC | Age: 79
End: 2019-09-05
Payer: MEDICARE

## 2019-09-05 PROCEDURE — 72100 X-RAY EXAM L-S SPINE 2/3 VWS: CPT

## 2019-09-05 PROCEDURE — 99214 OFFICE O/P EST MOD 30 MIN: CPT

## 2019-09-16 ENCOUNTER — APPOINTMENT (OUTPATIENT)
Dept: ORTHOPEDIC SURGERY | Facility: CLINIC | Age: 79
End: 2019-09-16
Payer: MEDICARE

## 2019-09-16 PROCEDURE — 73502 X-RAY EXAM HIP UNI 2-3 VIEWS: CPT | Mod: LT

## 2019-09-16 PROCEDURE — 73552 X-RAY EXAM OF FEMUR 2/>: CPT | Mod: LT

## 2019-09-16 PROCEDURE — 99214 OFFICE O/P EST MOD 30 MIN: CPT

## 2019-09-23 ENCOUNTER — APPOINTMENT (OUTPATIENT)
Dept: ORTHOPEDIC SURGERY | Facility: CLINIC | Age: 79
End: 2019-09-23
Payer: MEDICARE

## 2019-09-23 PROCEDURE — 99214 OFFICE O/P EST MOD 30 MIN: CPT

## 2019-09-25 ENCOUNTER — INBOUND DOCUMENT (OUTPATIENT)
Age: 79
End: 2019-09-25

## 2019-09-25 ENCOUNTER — FORM ENCOUNTER (OUTPATIENT)
Age: 79
End: 2019-09-25

## 2019-09-26 ENCOUNTER — OUTPATIENT (OUTPATIENT)
Dept: OUTPATIENT SERVICES | Facility: HOSPITAL | Age: 79
LOS: 1 days | End: 2019-09-26
Payer: MEDICARE

## 2019-09-26 ENCOUNTER — APPOINTMENT (OUTPATIENT)
Dept: MRI IMAGING | Facility: CLINIC | Age: 79
End: 2019-09-26
Payer: MEDICARE

## 2019-09-26 DIAGNOSIS — M54.16 RADICULOPATHY, LUMBAR REGION: ICD-10-CM

## 2019-09-26 DIAGNOSIS — Z98.890 OTHER SPECIFIED POSTPROCEDURAL STATES: Chronic | ICD-10-CM

## 2019-09-26 DIAGNOSIS — E89.0 POSTPROCEDURAL HYPOTHYROIDISM: Chronic | ICD-10-CM

## 2019-09-26 DIAGNOSIS — Z90.89 ACQUIRED ABSENCE OF OTHER ORGANS: Chronic | ICD-10-CM

## 2019-09-26 PROCEDURE — 72158 MRI LUMBAR SPINE W/O & W/DYE: CPT | Mod: 26

## 2019-09-26 PROCEDURE — 72158 MRI LUMBAR SPINE W/O & W/DYE: CPT

## 2019-09-26 PROCEDURE — A9585: CPT

## 2019-10-04 ENCOUNTER — TRANSCRIPTION ENCOUNTER (OUTPATIENT)
Age: 79
End: 2019-10-04

## 2019-10-21 NOTE — ASSESSMENT
NEPHROLOGY OFFICE VISIT   Jonna Pablo 70 y o  male MRN: [id number]  Bisphosphonate Therapy] : Risks  and benefits of bisphosphonate therapy were  discussed with the patient including gastroesophageal irritation, osteonecrosis of the jaw, and atypical femur fractures, and acute phase reaction [FreeTextEntry1] : 79 y.o. female with h/o osteopenia and hypothyroidism s/p surgery for Graves disease.\par 1. Osteopenia- Encouraged weight bearing activity. Reviewed appropriate calcium and vitamin D intake. Will discontinue Alendronate 70 mg po weekly and proceed with 1 year drug holiday. DEXA scan performed today shows stability with spine -1.2, right femoral neck -2.0, total hip -1.6 and 1/3 radius -1.8. Fall precautions discussed. Will repeat DEXA scan in June 2020 and reevaluate need for treatment and options.  \par 2. Hypothyroidism- Patient is euthyroid. Will continue LT4 100 mcg daily 6 days per week and 1/2 tab once weekly\par 3. Prediabetes- Encouraged carbohydrate consistent diet.\par \par Follow up in 1 year 

## 2019-11-04 ENCOUNTER — RX RENEWAL (OUTPATIENT)
Age: 79
End: 2019-11-04

## 2019-11-04 ENCOUNTER — APPOINTMENT (OUTPATIENT)
Dept: ORTHOPEDIC SURGERY | Facility: CLINIC | Age: 79
End: 2019-11-04
Payer: MEDICARE

## 2019-11-04 PROCEDURE — 99214 OFFICE O/P EST MOD 30 MIN: CPT

## 2019-11-06 ENCOUNTER — RX RENEWAL (OUTPATIENT)
Age: 79
End: 2019-11-06

## 2019-11-12 ENCOUNTER — RX RENEWAL (OUTPATIENT)
Age: 79
End: 2019-11-12

## 2019-12-10 ENCOUNTER — RX RENEWAL (OUTPATIENT)
Age: 79
End: 2019-12-10

## 2019-12-10 ENCOUNTER — APPOINTMENT (OUTPATIENT)
Dept: ORTHOPEDIC SURGERY | Facility: CLINIC | Age: 79
End: 2019-12-10
Payer: MEDICARE

## 2019-12-10 PROCEDURE — 99214 OFFICE O/P EST MOD 30 MIN: CPT

## 2019-12-18 ENCOUNTER — APPOINTMENT (OUTPATIENT)
Dept: INTERNAL MEDICINE | Facility: CLINIC | Age: 79
End: 2019-12-18
Payer: MEDICARE

## 2019-12-18 VITALS — DIASTOLIC BLOOD PRESSURE: 90 MMHG | HEART RATE: 92 BPM | SYSTOLIC BLOOD PRESSURE: 150 MMHG

## 2019-12-18 VITALS — SYSTOLIC BLOOD PRESSURE: 142 MMHG | HEART RATE: 84 BPM | DIASTOLIC BLOOD PRESSURE: 90 MMHG

## 2019-12-18 VITALS
SYSTOLIC BLOOD PRESSURE: 140 MMHG | HEART RATE: 75 BPM | BODY MASS INDEX: 26.9 KG/M2 | OXYGEN SATURATION: 98 % | WEIGHT: 123 LBS | DIASTOLIC BLOOD PRESSURE: 76 MMHG | HEIGHT: 56.5 IN | TEMPERATURE: 97.8 F

## 2019-12-18 DIAGNOSIS — Z87.39 PERSONAL HISTORY OF OTHER DISEASES OF THE MUSCULOSKELETAL SYSTEM AND CONNECTIVE TISSUE: ICD-10-CM

## 2019-12-18 DIAGNOSIS — M79.605 PAIN IN LEFT LEG: ICD-10-CM

## 2019-12-18 PROCEDURE — 99214 OFFICE O/P EST MOD 30 MIN: CPT

## 2019-12-18 RX ORDER — MELOXICAM 7.5 MG/1
7.5 TABLET ORAL
Qty: 20 | Refills: 0 | Status: DISCONTINUED | COMMUNITY
Start: 2019-09-18 | End: 2019-12-18

## 2019-12-18 RX ORDER — RASAGILINE MESYLATE 1 MG/1
1 TABLET ORAL DAILY
Qty: 90 | Refills: 3 | Status: DISCONTINUED | COMMUNITY
Start: 2017-07-11 | End: 2019-12-18

## 2019-12-18 RX ORDER — OXYCODONE 10 MG/1
10 TABLET ORAL
Qty: 90 | Refills: 0 | Status: DISCONTINUED | COMMUNITY
Start: 2019-11-04 | End: 2019-12-18

## 2019-12-18 RX ORDER — NAPROXEN SODIUM 550 MG/1
550 TABLET ORAL
Qty: 60 | Refills: 1 | Status: DISCONTINUED | COMMUNITY
Start: 2019-11-04 | End: 2019-12-18

## 2019-12-18 RX ORDER — TRAMADOL HYDROCHLORIDE 50 MG/1
50 TABLET, COATED ORAL
Qty: 60 | Refills: 0 | Status: DISCONTINUED | COMMUNITY
Start: 2019-09-23 | End: 2019-12-18

## 2019-12-18 RX ORDER — IBUPROFEN 800 MG/1
800 TABLET, FILM COATED ORAL 3 TIMES DAILY
Qty: 90 | Refills: 1 | Status: DISCONTINUED | COMMUNITY
Start: 2019-09-30 | End: 2019-12-18

## 2019-12-22 PROBLEM — M79.605 LEFT LEG PAIN: Status: RESOLVED | Noted: 2019-09-16 | Resolved: 2019-12-22

## 2019-12-22 PROBLEM — Z87.39 HISTORY OF LOW BACK PAIN: Status: RESOLVED | Noted: 2019-09-05 | Resolved: 2019-12-22

## 2019-12-22 NOTE — ASSESSMENT
[FreeTextEntry1] : There is no contraindication to planned procedure and she will be medically cleared once I review the PSTs and if this is done within a month from now. She will take her usual medications the morning of surgery.\par Her blood pressure is borderline and she is not orthostatic. Her mildly elevated blood pressure and edema may be due to naproxen usage. She was advised to watch salt in her diet.\par She will be seen again in 4 months.

## 2019-12-22 NOTE — HISTORY OF PRESENT ILLNESS
[FreeTextEntry1] : Lumbar radiculopathy\par Preop clearance\par Parkinson\par History of orthostatic hypotension [de-identified] : She has been having pain down her right leg for the past month. She saw orthopedics  and was diagnosed with a herniated disc and is planning a minimal discectomy perhaps the end of the month. She is taking oxycodone 3-4 times a day for the pain and Naproxen mg twice a day.  .  Her best position is sitting.   She can have some paresthesias in the leg and feels the right leg is a little weak. The pain began gradually with no clear trigger. She tried physical therapy which was unsuccessful. 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 She is moving her bowels well on lactulose. She has no urinary incontinence or complaints.  She had no problems with prior surgery or anesthesia. She feels her Parkinson's is stable. She does yoga daily .  She still sees occasional swelling of her legs during the day. She is not careful with salt in her diet.  She has no dizziness or lightheadedness.

## 2019-12-22 NOTE — PHYSICAL EXAM
[No Acute Distress] : no acute distress [Well Nourished] : well nourished [Well Developed] : well developed [Normal Voice/Communication] : normal voice/communication [Well-Appearing] : well-appearing [PERRL] : pupils equal round and reactive to light [Normal Sclera/Conjunctiva] : normal sclera/conjunctiva [Normal Outer Ear/Nose] : the outer ears and nose were normal in appearance [EOMI] : extraocular movements intact [Normal Oropharynx] : the oropharynx was normal [Normal TMs] : both tympanic membranes were normal [No JVD] : no jugular venous distention [No Lymphadenopathy] : no lymphadenopathy [Supple] : supple [No Respiratory Distress] : no respiratory distress  [Thyroid Normal, No Nodules] : the thyroid was normal and there were no nodules present [No Accessory Muscle Use] : no accessory muscle use [Clear to Auscultation] : lungs were clear to auscultation bilaterally [Normal Percussion] : the chest was normal to percussion [Normal Rate] : normal rate  [Normal S1, S2] : normal S1 and S2 [No Murmur] : no murmur heard [Regular Rhythm] : with a regular rhythm [No Carotid Bruits] : no carotid bruits [No Abdominal Bruit] : a ~M bruit was not heard ~T in the abdomen [No Varicosities] : no varicosities [Pedal Pulses Present] : the pedal pulses are present [No Edema] : there was no peripheral edema [No Palpable Aorta] : no palpable aorta [No Extremity Clubbing/Cyanosis] : no extremity clubbing/cyanosis [Declined Breast Exam] : declined breast exam  [Soft] : abdomen soft [Non Tender] : non-tender [No Masses] : no abdominal mass palpated [Non-distended] : non-distended [No HSM] : no HSM [Normal Bowel Sounds] : normal bowel sounds [Normal Supraclavicular Nodes] : no supraclavicular lymphadenopathy [Normal Axillary Nodes] : no axillary lymphadenopathy [Normal Posterior Cervical Nodes] : no posterior cervical lymphadenopathy [Normal Anterior Cervical Nodes] : no anterior cervical lymphadenopathy [Normal Inguinal Nodes] : no inguinal lymphadenopathy [No CVA Tenderness] : no CVA  tenderness [No Spinal Tenderness] : no spinal tenderness [No Joint Swelling] : no joint swelling [Grossly Normal Strength/Tone] : grossly normal strength/tone [No Rash] : no rash [Coordination Grossly Intact] : coordination grossly intact [No Focal Deficits] : no focal deficits [Deep Tendon Reflexes (DTR)] : deep tendon reflexes were 2+ and symmetric [Speech Grossly Normal] : speech grossly normal [Memory Grossly Normal] : memory grossly normal [Normal Affect] : the affect was normal [Alert and Oriented x3] : oriented to person, place, and time [Normal Mood] : the mood was normal [Normal Insight/Judgement] : insight and judgment were intact [de-identified] : Walking with a cane. Masked facies. No rigidity or tremor. No pain straight leg raising. Motor grossly 5 over 5 [de-identified] : No joint swelling

## 2019-12-23 ENCOUNTER — MEDICATION RENEWAL (OUTPATIENT)
Age: 79
End: 2019-12-23

## 2020-01-10 ENCOUNTER — OUTPATIENT (OUTPATIENT)
Dept: OUTPATIENT SERVICES | Facility: HOSPITAL | Age: 80
LOS: 1 days | End: 2020-01-10
Payer: MEDICARE

## 2020-01-10 VITALS
RESPIRATION RATE: 15 BRPM | WEIGHT: 119.93 LBS | HEART RATE: 78 BPM | DIASTOLIC BLOOD PRESSURE: 88 MMHG | HEIGHT: 59 IN | TEMPERATURE: 98 F | OXYGEN SATURATION: 100 % | SYSTOLIC BLOOD PRESSURE: 150 MMHG

## 2020-01-10 DIAGNOSIS — M54.16 RADICULOPATHY, LUMBAR REGION: ICD-10-CM

## 2020-01-10 DIAGNOSIS — M51.26 OTHER INTERVERTEBRAL DISC DISPLACEMENT, LUMBAR REGION: ICD-10-CM

## 2020-01-10 DIAGNOSIS — Z01.818 ENCOUNTER FOR OTHER PREPROCEDURAL EXAMINATION: ICD-10-CM

## 2020-01-10 DIAGNOSIS — E03.9 HYPOTHYROIDISM, UNSPECIFIED: ICD-10-CM

## 2020-01-10 DIAGNOSIS — E89.0 POSTPROCEDURAL HYPOTHYROIDISM: Chronic | ICD-10-CM

## 2020-01-10 DIAGNOSIS — G20 PARKINSON'S DISEASE: ICD-10-CM

## 2020-01-10 DIAGNOSIS — Z98.890 OTHER SPECIFIED POSTPROCEDURAL STATES: Chronic | ICD-10-CM

## 2020-01-10 DIAGNOSIS — Z29.9 ENCOUNTER FOR PROPHYLACTIC MEASURES, UNSPECIFIED: ICD-10-CM

## 2020-01-10 DIAGNOSIS — Z96.642 PRESENCE OF LEFT ARTIFICIAL HIP JOINT: Chronic | ICD-10-CM

## 2020-01-10 DIAGNOSIS — Z90.89 ACQUIRED ABSENCE OF OTHER ORGANS: Chronic | ICD-10-CM

## 2020-01-10 LAB
ANION GAP SERPL CALC-SCNC: 17 MMOL/L — SIGNIFICANT CHANGE UP (ref 5–17)
BUN SERPL-MCNC: 30 MG/DL — HIGH (ref 7–23)
CALCIUM SERPL-MCNC: 9.9 MG/DL — SIGNIFICANT CHANGE UP (ref 8.4–10.5)
CHLORIDE SERPL-SCNC: 97 MMOL/L — SIGNIFICANT CHANGE UP (ref 96–108)
CO2 SERPL-SCNC: 25 MMOL/L — SIGNIFICANT CHANGE UP (ref 22–31)
CREAT SERPL-MCNC: 0.75 MG/DL — SIGNIFICANT CHANGE UP (ref 0.5–1.3)
GLUCOSE SERPL-MCNC: 78 MG/DL — SIGNIFICANT CHANGE UP (ref 70–99)
HCT VFR BLD CALC: 43.3 % — SIGNIFICANT CHANGE UP (ref 34.5–45)
HGB BLD-MCNC: 13.7 G/DL — SIGNIFICANT CHANGE UP (ref 11.5–15.5)
MCHC RBC-ENTMCNC: 28.7 PG — SIGNIFICANT CHANGE UP (ref 27–34)
MCHC RBC-ENTMCNC: 31.6 GM/DL — LOW (ref 32–36)
MCV RBC AUTO: 90.8 FL — SIGNIFICANT CHANGE UP (ref 80–100)
PLATELET # BLD AUTO: 288 K/UL — SIGNIFICANT CHANGE UP (ref 150–400)
POTASSIUM SERPL-MCNC: 3.6 MMOL/L — SIGNIFICANT CHANGE UP (ref 3.5–5.3)
POTASSIUM SERPL-SCNC: 3.6 MMOL/L — SIGNIFICANT CHANGE UP (ref 3.5–5.3)
RBC # BLD: 4.77 M/UL — SIGNIFICANT CHANGE UP (ref 3.8–5.2)
RBC # FLD: 13.9 % — SIGNIFICANT CHANGE UP (ref 10.3–14.5)
SODIUM SERPL-SCNC: 139 MMOL/L — SIGNIFICANT CHANGE UP (ref 135–145)
WBC # BLD: 10.41 K/UL — SIGNIFICANT CHANGE UP (ref 3.8–10.5)
WBC # FLD AUTO: 10.41 K/UL — SIGNIFICANT CHANGE UP (ref 3.8–10.5)

## 2020-01-10 PROCEDURE — 87640 STAPH A DNA AMP PROBE: CPT

## 2020-01-10 PROCEDURE — 87641 MR-STAPH DNA AMP PROBE: CPT

## 2020-01-10 PROCEDURE — 85027 COMPLETE CBC AUTOMATED: CPT

## 2020-01-10 PROCEDURE — 80048 BASIC METABOLIC PNL TOTAL CA: CPT

## 2020-01-10 PROCEDURE — G0463: CPT

## 2020-01-10 RX ORDER — LANOLIN ALCOHOL/MO/W.PET/CERES
1 CREAM (GRAM) TOPICAL
Qty: 0 | Refills: 0 | DISCHARGE

## 2020-01-10 RX ORDER — CEFAZOLIN SODIUM 1 G
2000 VIAL (EA) INJECTION ONCE
Refills: 0 | Status: DISCONTINUED | OUTPATIENT
Start: 2020-01-18 | End: 2020-01-20

## 2020-01-10 NOTE — H&P PST ADULT - ATTENDING COMMENTS
78 yo female with lumbar radiculopathy s/p laminectomy and spinal fusion, with L34 HNP, failed  PT, and oxycodone, not a candidate for ELE due to AVN of left hip, recommend microdiscectomy Right L34 microdiscectomy.     I reviewed all major risks, benefits, and complications associated with lumbar laminectomy and/or microdiscectomy including but not limited to bleeding, infection, CSF leak, neurological injury, chronic pain, reherniation, hematoma worsening of pain, anesthetic risk, chronic pain and disability, need for further surgical intervention, medical complications (GI, , DVT, PE, cardiac, pulmonary, etc) and risk of mortality.

## 2020-01-10 NOTE — H&P PST ADULT - NSICDXPROBLEM_GEN_ALL_CORE_FT
PROBLEM DIAGNOSES  Problem: Unilateral primary osteoarthritis, left hip  Assessment and Plan: Scheduled for left total hip replacement direct anterior approach on 5/1/5/2019.  labs done and results pending. Surgical scrub & preop instruction given and explained. Famotidine provided with instruction. Verbalized understanding.   Medical evaluation requested by surgeon. Will obtain for PST chart.       Problem: Hypothyroidism  Assessment and Plan: Instructed to take Levoxyl AM of surgery with a sip of water.     Problem: Parkinsons  Assessment and Plan: Instructed to take carbidopa/levodopa, Azilect AM of surgery with a sip of water. PROBLEM DIAGNOSES  Problem: Lumbar radicular pain  Assessment and Plan: Scheduled L3-4 right hemilaminotomy and discectomy on 1/18/2020  Pre-op instructions given to pt, including chlorhexidine soap  Lab work sent at PST    Problem: Parkinson's disease  Assessment and Plan: Pt to continue medication pre-op and am of sx    Problem: Need for prophylactic measure  Assessment and Plan: The Caprini score indicates this patient is at risk for a VTE event (score 3-5).  Most surgical patients in this group would benefit from pharmacologic prophylaxis.  The surgical team will determine the balance between VTE risk and bleeding risk     Problem: Hypothyroidism  Assessment and Plan: Pt to continue medication pre-op and am of sx

## 2020-01-10 NOTE — H&P PST ADULT - MUSCULOSKELETAL COMMENTS
ambulates with a walker / pt states, noticed difference in length of legs since spinal surgery. preop dx of unilateral primary osteoarthritis, left hip

## 2020-01-10 NOTE — H&P PST ADULT - NSICDXPASTSURGICALHX_GEN_ALL_CORE_FT
PAST SURGICAL HISTORY:  H/O colonoscopy 2018    History of shoulder surgery Right- long time ago    History of thyroidectomy, total 1992    History of tonsillectomy childhood    S/P lumbar laminectomy with fusion in 3/1/2019    Status post left hip replacement 5/15/2019

## 2020-01-10 NOTE — H&P PST ADULT - MUSCULOSKELETAL
details… detailed exam decreased ROM due to pain/no calf tenderness/decreased ROM/LLE, left hip decreased ROM/lower back radiating down RLE/decreased ROM due to pain/no calf tenderness

## 2020-01-10 NOTE — H&P PST ADULT - NEGATIVE ENMT SYMPTOMS
no throat pain/no dysphagia/no hearing difficulty/no nasal congestion/no nasal obstruction/no nasal discharge/no ear pain/no tinnitus/no post-nasal discharge/no dry mouth

## 2020-01-10 NOTE — H&P PST ADULT - NEGATIVE GENERAL GENITOURINARY SYMPTOMS
no hematuria/no dysuria/no urinary hesitancy/no renal colic/no flank pain R/no flank pain L/normal urinary frequency

## 2020-01-10 NOTE — H&P PST ADULT - HISTORY OF PRESENT ILLNESS
78 yo female with hx of HTN, HLD, hypothyroidism, parkinson's presents to have PST evaluation with preop dx of unilateral primary osteoarthritis, left hip. Patient reports, hx of left hip nerve pain in 8/2019, had multiple steroid treatments with some relief. Patient states, hip pain worsened in 12/2018, went for an evaluation, had x-ray & MRI of hip done (4/2019), surgical intervention was recommended, now scheduled for left total hip replacement direct anterior approach on 5/15/2019.   Patient is s/p lumbar spinal surgery in 3/2019 with Dr. Patricia. 80 y/o female with PMHx of HTN, HLD, hypothyroidism, Parkinson's, OA (s/p left THR 5/2019), s/p lumbar laminectomy with fusion on 3/1/19, c/o lower back pain radiating down RLE to right foot, progressively worsening over the past few months. Pt ambulates with a walker. Diagnostic imaging revealed lumbar disc protrusion at L3-4 levels. Evaluated by Dr. Patricia. Presents to PST for a scheduled L3-4 right hemilaminotomy and discectomy on 1/18/2020.

## 2020-01-10 NOTE — H&P PST ADULT - ASSESSMENT
80 yo female with preop dx of unilateral primary osteoarthritis, left hip CAPRINI SCORE [CLOT updated 18]    AGE RELATED RISK FACTORS                                                       MOBILITY RELATED FACTORS  [ ] Age 41-60 years                                            (1 Point)                    [ ] Bed rest                                                        (1 Point)  [ ] Age: 61-74 years                                           (2 Points)                  [ ] Plaster cast                                                   (2 Points)  [x] Age= 75 years                                              (3 Points)                    [ ] Bed bound for more than 72 hours                 (2 Points)    DISEASE RELATED RISK FACTORS                                               GENDER SPECIFIC FACTORS  [ ] Edema in the lower extremities                       (1 Point)              [ ] Pregnancy                                                     (1 Point)  [ ] Varicose veins                                               (1 Point)                     [ ] Post-partum < 6 weeks                                   (1 Point)             [ ] BMI > 25 Kg/m2                                            (1 Point)                     [ ] Hormonal therapy  or oral contraception          (1 Point)                 [ ] Sepsis (in the previous month)                        (1 Point)               [ ] History of pregnancy complications                 (1 point)  [ ] Pneumonia or serious lung disease                                               [ ] Unexplained or recurrent                     (1 Point)           (in the previous month)                               (1 Point)  [ ] Abnormal pulmonary function test                     (1 Point)                 SURGERY RELATED RISK FACTORS  [ ] Acute myocardial infarction                              (1 Point)               [ ]  Section                                             (1 Point)  [ ] Congestive heart failure (in the previous month)  (1 Point)      [ ] Minor surgery                                                  (1 Point)   [ ] Inflammatory bowel disease                             (1 Point)               [ ] Arthroscopic surgery                                        (2 Points)  [ ] Central venous access                                      (2 Points)                [x] General surgery lasting more than 45 minutes (2 points)  [ ] Present or previous malignancy                     (2 Points)                [ ] Elective arthroplasty                                         (5 points)    [ ] Stroke (in the previous month)                          (5 Points)                                                                                                                                                           HEMATOLOGY RELATED FACTORS                                                 TRAUMA RELATED RISK FACTORS  [ ] Prior episodes of VTE                                     (3 Points)                [ ] Fracture of the hip, pelvis, or leg                       (5 Points)  [ ] Positive family history for VTE                         (3 Points)             [ ] Acute spinal cord injury (in the previous month)  (5 Points)  [ ] Prothrombin 93951 A                                     (3 Points)               [ ] Paralysis  (less than 1 month)                             (5 Points)  [ ] Factor V Leiden                                             (3 Points)                  [ ] Multiple Trauma within 1 month                        (5 Points)  [ ] Lupus anticoagulants                                     (3 Points)                                                           [ ] Anticardiolipin antibodies                               (3 Points)                                                       [ ] High homocysteine in the blood                      (3 Points)                                             [ ] Other congenital or acquired thrombophilia      (3 Points)                                                [ ] Heparin induced thrombocytopenia                  (3 Points)                                     Total Score [     5     ]

## 2020-01-10 NOTE — H&P PST ADULT - CONSTITUTIONAL DETAILS
well-developed/no distress/distress due to pain/well-groomed/well-nourished well-developed/well-nourished/distress due to pain/well-groomed

## 2020-01-10 NOTE — H&P PST ADULT - RS GEN PE MLT RESP DETAILS PC
no rales/normal/airway patent/respirations non-labored/no rhonchi/no wheezes/breath sounds equal/good air movement/clear to auscultation bilaterally no rhonchi/no wheezes/respirations non-labored/no rales/clear to auscultation bilaterally

## 2020-01-10 NOTE — H&P PST ADULT - NSICDXPASTMEDICALHX_GEN_ALL_CORE_FT
PAST MEDICAL HISTORY:  MARK positive     Edema of lower extremity seen by PMD doppler done -as per patient normal    Essential hypertension     H/O Graves' disease     HLD (hyperlipidemia)     Hypothyroidism     MVP (mitral valve prolapse) pt reports asymptomatic, last Echo >10 years ago    Osteoporosis     Parkinson's disease dx 2012    Spondylolisthesis of lumbar region s/p lumbar laminectomy with fusion in 3/2019 PAST MEDICAL HISTORY:  MARK positive     Edema of lower extremity seen by PMD doppler done -as per patient normal    Essential hypertension currently not on medication    H/O Graves' disease     HLD (hyperlipidemia)     Hypothyroidism     MVP (mitral valve prolapse) pt reports asymptomatic, last Echo >10 years ago    Osteoporosis     Parkinson's disease dx 2012    Spondylolisthesis of lumbar region s/p lumbar laminectomy with fusion in 3/2019

## 2020-01-11 LAB
MRSA PCR RESULT.: SIGNIFICANT CHANGE UP
S AUREUS DNA NOSE QL NAA+PROBE: SIGNIFICANT CHANGE UP

## 2020-01-15 PROBLEM — I34.1 NONRHEUMATIC MITRAL (VALVE) PROLAPSE: Chronic | Status: ACTIVE | Noted: 2019-02-22

## 2020-01-15 PROBLEM — I10 ESSENTIAL (PRIMARY) HYPERTENSION: Chronic | Status: ACTIVE | Noted: 2019-02-22

## 2020-01-16 NOTE — CONSULT NOTE ADULT - ASSESSMENT
Patient is a 79y old  Female who presents with a chief complaint of L3-L4 mecca laminectomy discectomy (19 Jan 2020 08:18).    S/p L3-4 Hemilami/Disc:  Ortho spine f/up noted.  Pain stable with celebrex/Percocet  OOB    Hypothyroidism;  Cw synthroid    HLD:  Cw lipitor.

## 2020-01-16 NOTE — CONSULT NOTE ADULT - SUBJECTIVE AND OBJECTIVE BOX
Patient is a 79y old  Female who presents with a chief complaint of L3-L4 mecca laminectomy discectomy (19 Jan 2020 08:18)      HPI:  78 y/o female with PMHx of HTN, HLD, hypothyroidism, Parkinson's, OA (s/p left THR 5/2019), s/p lumbar laminectomy with fusion on 3/1/19, c/o lower back pain radiating down RLE to right foot, progressively worsening over the past few months. Pt ambulates with a walker. Diagnostic imaging revealed lumbar disc protrusion at L3-4 levels. Evaluated by Dr. Patricia. Presents to PST for a scheduled L3-4 right hemilaminotomy and discectomy on 1/18/2020. (10 Tyler 2020 13:47)      PAST MEDICAL & SURGICAL HISTORY:  H/O Graves' disease  MVP (mitral valve prolapse): pt reports asymptomatic, last Echo &gt;10 years ago  Edema of lower extremity: seen by PMD doppler done -as per patient normal  MARK positive  Spondylolisthesis of lumbar region: s/p lumbar laminectomy with fusion in 3/2019  Hypothyroidism  Osteoporosis  HLD (hyperlipidemia)  Essential hypertension: currently not on medication  Parkinson's disease: dx 2012  Status post left hip replacement: 5/15/2019  S/P lumbar laminectomy: with fusion in 3/1/2019  H/O colonoscopy: 2018  History of tonsillectomy: childhood  History of shoulder surgery: Right- long time ago  History of thyroidectomy, total: 1992      Review of Systems:   CONSTITUTIONAL: No fever,  or fatigue    RESPIRATORY: No cough, wheezing, chills or hemoptysis; No shortness of breath  CARDIOVASCULAR: No chest pain, palpitations, dizziness, or leg swelling  GASTROINTESTINAL: No abdominal or epigastric pain. No nausea, vomiting, or hematemesis; No diarrhea or constipation.   NEUROLOGICAL: No headaches,           Allergies    No Known Allergies    Intolerances    steroid injection or prednisone po- pt reports caused left hip deterioration (Other)      Social History:     FAMILY HISTORY:  FH: heart disease: sister  FH: lung cancer: sister  Family history of cerebral hemorrhage: mother  FH: kidney cancer: father      MEDICATIONS  (STANDING):  atorvastatin 40 milliGRAM(s) Oral at bedtime  carbidopa/levodopa  25/100 1 Tablet(s) Oral three times a day  ceFAZolin   IVPB 2000 milliGRAM(s) IV Intermittent once  celecoxib 200 milliGRAM(s) Oral every 12 hours  cyclobenzaprine 10 milliGRAM(s) Oral every 8 hours  levothyroxine 50 MICROGram(s) Oral <User Schedule>  levothyroxine 100 MICROGram(s) Oral <User Schedule>  multivitamin 1 Tablet(s) Oral daily  pantoprazole    Tablet 40 milliGRAM(s) Oral before breakfast  senna 2 Tablet(s) Oral at bedtime  sodium chloride 0.9%. 1000 milliLiter(s) (75 mL/Hr) IV Continuous <Continuous>    MEDICATIONS  (PRN):  magnesium hydroxide Suspension 30 milliLiter(s) Oral every 12 hours PRN Constipation  oxycodone    5 mG/acetaminophen 325 mG 1 Tablet(s) Oral every 4 hours PRN Moderate Pain (4 - 6)  oxycodone    5 mG/acetaminophen 325 mG 2 Tablet(s) Oral every 8 hours PRN Severe Pain (7 - 10)  prochlorperazine   IVPB 10 milliGRAM(s) IV Intermittent every 8 hours PRN Nausea/vomiting      CAPILLARY BLOOD GLUCOSE        I&O's Summary    18 Jan 2020 07:01  -  19 Jan 2020 07:00  --------------------------------------------------------  IN: 840 mL / OUT: 760 mL / NET: 80 mL    19 Jan 2020 07:01  -  19 Jan 2020 19:56  --------------------------------------------------------  IN: 980 mL / OUT: 0 mL / NET: 980 mL        PHYSICAL EXAM:    GENERAL: NAD  NECK: Supple, No JVD  CHEST/LUNG: Clear to auscultation bilaterally; No wheezing.  HEART: Regular rate and rhythm; No murmurs, rubs, or gallops  ABDOMEN: Soft, Nontender, Nondistended; Bowel sounds present  EXTREMITIES:  2+ Peripheral Pulses, No edema  NEUROLOGY: AAOx 3      LABS:                        12.4   11.22 )-----------( 261      ( 19 Jan 2020 09:31 )             38.5     01-19    140  |  102  |  16  ----------------------------<  105<H>  4.1   |  27  |  0.66    Ca    9.1      19 Jan 2020 07:16              CAPILLARY BLOOD GLUCOSE                    RADIOLOGY & ADDITIONAL TESTS:    Imaging Personally Reviewed:    Consultant(s) Notes Reviewed:      Care Discussed with Consultants/Other Providers:    Thanks for consult. Will follow.

## 2020-01-17 ENCOUNTER — TRANSCRIPTION ENCOUNTER (OUTPATIENT)
Age: 80
End: 2020-01-17

## 2020-01-18 ENCOUNTER — APPOINTMENT (OUTPATIENT)
Dept: ORTHOPEDIC SURGERY | Facility: HOSPITAL | Age: 80
End: 2020-01-18

## 2020-01-18 ENCOUNTER — INPATIENT (INPATIENT)
Facility: HOSPITAL | Age: 80
LOS: 1 days | Discharge: HOME | DRG: 520 | End: 2020-01-20
Attending: ORTHOPAEDIC SURGERY | Admitting: ORTHOPAEDIC SURGERY
Payer: MEDICARE

## 2020-01-18 ENCOUNTER — RESULT REVIEW (OUTPATIENT)
Age: 80
End: 2020-01-18

## 2020-01-18 VITALS
RESPIRATION RATE: 18 BRPM | TEMPERATURE: 98 F | HEART RATE: 83 BPM | SYSTOLIC BLOOD PRESSURE: 184 MMHG | DIASTOLIC BLOOD PRESSURE: 93 MMHG | OXYGEN SATURATION: 99 %

## 2020-01-18 DIAGNOSIS — Z98.890 OTHER SPECIFIED POSTPROCEDURAL STATES: Chronic | ICD-10-CM

## 2020-01-18 DIAGNOSIS — Z90.89 ACQUIRED ABSENCE OF OTHER ORGANS: Chronic | ICD-10-CM

## 2020-01-18 DIAGNOSIS — E89.0 POSTPROCEDURAL HYPOTHYROIDISM: Chronic | ICD-10-CM

## 2020-01-18 DIAGNOSIS — M51.26 OTHER INTERVERTEBRAL DISC DISPLACEMENT, LUMBAR REGION: ICD-10-CM

## 2020-01-18 DIAGNOSIS — Z96.642 PRESENCE OF LEFT ARTIFICIAL HIP JOINT: Chronic | ICD-10-CM

## 2020-01-18 LAB
ANION GAP SERPL CALC-SCNC: 15 MMOL/L — SIGNIFICANT CHANGE UP (ref 5–17)
BASOPHILS # BLD AUTO: 0.07 K/UL — SIGNIFICANT CHANGE UP (ref 0–0.2)
BASOPHILS NFR BLD AUTO: 0.8 % — SIGNIFICANT CHANGE UP (ref 0–2)
BUN SERPL-MCNC: 19 MG/DL — SIGNIFICANT CHANGE UP (ref 7–23)
CALCIUM SERPL-MCNC: 9.1 MG/DL — SIGNIFICANT CHANGE UP (ref 8.4–10.5)
CHLORIDE SERPL-SCNC: 98 MMOL/L — SIGNIFICANT CHANGE UP (ref 96–108)
CO2 SERPL-SCNC: 27 MMOL/L — SIGNIFICANT CHANGE UP (ref 22–31)
CREAT SERPL-MCNC: 0.69 MG/DL — SIGNIFICANT CHANGE UP (ref 0.5–1.3)
EOSINOPHIL # BLD AUTO: 0.06 K/UL — SIGNIFICANT CHANGE UP (ref 0–0.5)
EOSINOPHIL NFR BLD AUTO: 0.7 % — SIGNIFICANT CHANGE UP (ref 0–6)
GLUCOSE SERPL-MCNC: 131 MG/DL — HIGH (ref 70–99)
HCT VFR BLD CALC: 41.6 % — SIGNIFICANT CHANGE UP (ref 34.5–45)
HGB BLD-MCNC: 12.9 G/DL — SIGNIFICANT CHANGE UP (ref 11.5–15.5)
IMM GRANULOCYTES NFR BLD AUTO: 0.5 % — SIGNIFICANT CHANGE UP (ref 0–1.5)
LYMPHOCYTES # BLD AUTO: 1.59 K/UL — SIGNIFICANT CHANGE UP (ref 1–3.3)
LYMPHOCYTES # BLD AUTO: 18.8 % — SIGNIFICANT CHANGE UP (ref 13–44)
MCHC RBC-ENTMCNC: 28 PG — SIGNIFICANT CHANGE UP (ref 27–34)
MCHC RBC-ENTMCNC: 31 GM/DL — LOW (ref 32–36)
MCV RBC AUTO: 90.4 FL — SIGNIFICANT CHANGE UP (ref 80–100)
MONOCYTES # BLD AUTO: 0.72 K/UL — SIGNIFICANT CHANGE UP (ref 0–0.9)
MONOCYTES NFR BLD AUTO: 8.5 % — SIGNIFICANT CHANGE UP (ref 2–14)
NEUTROPHILS # BLD AUTO: 5.96 K/UL — SIGNIFICANT CHANGE UP (ref 1.8–7.4)
NEUTROPHILS NFR BLD AUTO: 70.7 % — SIGNIFICANT CHANGE UP (ref 43–77)
NRBC # BLD: 0 /100 WBCS — SIGNIFICANT CHANGE UP (ref 0–0)
PLATELET # BLD AUTO: 255 K/UL — SIGNIFICANT CHANGE UP (ref 150–400)
POTASSIUM SERPL-MCNC: 3.4 MMOL/L — LOW (ref 3.5–5.3)
POTASSIUM SERPL-SCNC: 3.4 MMOL/L — LOW (ref 3.5–5.3)
RBC # BLD: 4.6 M/UL — SIGNIFICANT CHANGE UP (ref 3.8–5.2)
RBC # FLD: 13.6 % — SIGNIFICANT CHANGE UP (ref 10.3–14.5)
SODIUM SERPL-SCNC: 140 MMOL/L — SIGNIFICANT CHANGE UP (ref 135–145)
WBC # BLD: 8.44 K/UL — SIGNIFICANT CHANGE UP (ref 3.8–10.5)
WBC # FLD AUTO: 8.44 K/UL — SIGNIFICANT CHANGE UP (ref 3.8–10.5)

## 2020-01-18 PROCEDURE — 88311 DECALCIFY TISSUE: CPT | Mod: 26

## 2020-01-18 PROCEDURE — 63042 LAMINOTOMY SINGLE LUMBAR: CPT

## 2020-01-18 PROCEDURE — 88304 TISSUE EXAM BY PATHOLOGIST: CPT | Mod: 26

## 2020-01-18 PROCEDURE — 72100 X-RAY EXAM L-S SPINE 2/3 VWS: CPT | Mod: 26

## 2020-01-18 RX ORDER — OXYCODONE AND ACETAMINOPHEN 5; 325 MG/1; MG/1
2 TABLET ORAL EVERY 8 HOURS
Refills: 0 | Status: DISCONTINUED | OUTPATIENT
Start: 2020-01-18 | End: 2020-01-20

## 2020-01-18 RX ORDER — PROCHLORPERAZINE MALEATE 5 MG
10 TABLET ORAL EVERY 8 HOURS
Refills: 0 | Status: DISCONTINUED | OUTPATIENT
Start: 2020-01-18 | End: 2020-01-20

## 2020-01-18 RX ORDER — PANTOPRAZOLE SODIUM 20 MG/1
40 TABLET, DELAYED RELEASE ORAL
Refills: 0 | Status: DISCONTINUED | OUTPATIENT
Start: 2020-01-18 | End: 2020-01-20

## 2020-01-18 RX ORDER — SODIUM CHLORIDE 9 MG/ML
1000 INJECTION INTRAMUSCULAR; INTRAVENOUS; SUBCUTANEOUS
Refills: 0 | Status: DISCONTINUED | OUTPATIENT
Start: 2020-01-18 | End: 2020-01-20

## 2020-01-18 RX ORDER — FENTANYL CITRATE 50 UG/ML
25 INJECTION INTRAVENOUS
Refills: 0 | Status: DISCONTINUED | OUTPATIENT
Start: 2020-01-18 | End: 2020-01-18

## 2020-01-18 RX ORDER — CEFAZOLIN SODIUM 1 G
2000 VIAL (EA) INJECTION EVERY 8 HOURS
Refills: 0 | Status: COMPLETED | OUTPATIENT
Start: 2020-01-18 | End: 2020-01-18

## 2020-01-18 RX ORDER — ATORVASTATIN CALCIUM 80 MG/1
40 TABLET, FILM COATED ORAL AT BEDTIME
Refills: 0 | Status: DISCONTINUED | OUTPATIENT
Start: 2020-01-18 | End: 2020-01-20

## 2020-01-18 RX ORDER — SENNA PLUS 8.6 MG/1
2 TABLET ORAL AT BEDTIME
Refills: 0 | Status: DISCONTINUED | OUTPATIENT
Start: 2020-01-18 | End: 2020-01-20

## 2020-01-18 RX ORDER — CYCLOBENZAPRINE HYDROCHLORIDE 10 MG/1
10 TABLET, FILM COATED ORAL EVERY 8 HOURS
Refills: 0 | Status: DISCONTINUED | OUTPATIENT
Start: 2020-01-18 | End: 2020-01-20

## 2020-01-18 RX ORDER — SODIUM CHLORIDE 9 MG/ML
3 INJECTION INTRAMUSCULAR; INTRAVENOUS; SUBCUTANEOUS EVERY 8 HOURS
Refills: 0 | Status: DISCONTINUED | OUTPATIENT
Start: 2020-01-18 | End: 2020-01-18

## 2020-01-18 RX ORDER — POTASSIUM CHLORIDE 20 MEQ
40 PACKET (EA) ORAL ONCE
Refills: 0 | Status: COMPLETED | OUTPATIENT
Start: 2020-01-18 | End: 2020-01-18

## 2020-01-18 RX ORDER — CARBIDOPA AND LEVODOPA 25; 100 MG/1; MG/1
1 TABLET ORAL THREE TIMES A DAY
Refills: 0 | Status: DISCONTINUED | OUTPATIENT
Start: 2020-01-18 | End: 2020-01-20

## 2020-01-18 RX ORDER — ONDANSETRON 8 MG/1
4 TABLET, FILM COATED ORAL EVERY 8 HOURS
Refills: 0 | Status: DISCONTINUED | OUTPATIENT
Start: 2020-01-18 | End: 2020-01-18

## 2020-01-18 RX ORDER — LIDOCAINE HCL 20 MG/ML
0.2 VIAL (ML) INJECTION ONCE
Refills: 0 | Status: DISCONTINUED | OUTPATIENT
Start: 2020-01-18 | End: 2020-01-18

## 2020-01-18 RX ORDER — CHLORHEXIDINE GLUCONATE 213 G/1000ML
1 SOLUTION TOPICAL ONCE
Refills: 0 | Status: DISCONTINUED | OUTPATIENT
Start: 2020-01-18 | End: 2020-01-18

## 2020-01-18 RX ORDER — OXYCODONE AND ACETAMINOPHEN 5; 325 MG/1; MG/1
1 TABLET ORAL EVERY 4 HOURS
Refills: 0 | Status: DISCONTINUED | OUTPATIENT
Start: 2020-01-18 | End: 2020-01-20

## 2020-01-18 RX ORDER — MAGNESIUM HYDROXIDE 400 MG/1
30 TABLET, CHEWABLE ORAL EVERY 12 HOURS
Refills: 0 | Status: DISCONTINUED | OUTPATIENT
Start: 2020-01-18 | End: 2020-01-20

## 2020-01-18 RX ADMIN — FENTANYL CITRATE 25 MICROGRAM(S): 50 INJECTION INTRAVENOUS at 12:05

## 2020-01-18 RX ADMIN — ATORVASTATIN CALCIUM 40 MILLIGRAM(S): 80 TABLET, FILM COATED ORAL at 21:42

## 2020-01-18 RX ADMIN — OXYCODONE AND ACETAMINOPHEN 1 TABLET(S): 5; 325 TABLET ORAL at 23:48

## 2020-01-18 RX ADMIN — OXYCODONE AND ACETAMINOPHEN 1 TABLET(S): 5; 325 TABLET ORAL at 19:14

## 2020-01-18 RX ADMIN — CYCLOBENZAPRINE HYDROCHLORIDE 10 MILLIGRAM(S): 10 TABLET, FILM COATED ORAL at 14:49

## 2020-01-18 RX ADMIN — Medication 100 MILLIGRAM(S): at 16:22

## 2020-01-18 RX ADMIN — OXYCODONE AND ACETAMINOPHEN 1 TABLET(S): 5; 325 TABLET ORAL at 18:44

## 2020-01-18 RX ADMIN — Medication 40 MILLIEQUIVALENT(S): at 21:43

## 2020-01-18 RX ADMIN — CARBIDOPA AND LEVODOPA 1 TABLET(S): 25; 100 TABLET ORAL at 21:42

## 2020-01-18 RX ADMIN — FENTANYL CITRATE 25 MICROGRAM(S): 50 INJECTION INTRAVENOUS at 11:50

## 2020-01-18 RX ADMIN — CYCLOBENZAPRINE HYDROCHLORIDE 10 MILLIGRAM(S): 10 TABLET, FILM COATED ORAL at 21:42

## 2020-01-18 RX ADMIN — Medication 100 MILLIGRAM(S): at 23:51

## 2020-01-18 RX ADMIN — CARBIDOPA AND LEVODOPA 1 TABLET(S): 25; 100 TABLET ORAL at 14:59

## 2020-01-18 NOTE — BRIEF OPERATIVE NOTE - NSICDXBRIEFPREOP_GEN_ALL_CORE_FT
PRE-OP DIAGNOSIS:  Lumbar stenosis 18-Jan-2020 11:06:21  Rachana Jeter  Acute radicular low back pain 18-Jan-2020 11:05:48  Rachana Jeter

## 2020-01-18 NOTE — PHYSICAL THERAPY INITIAL EVALUATION ADULT - ADDITIONAL COMMENTS
PTA pt was living in a apartment + elevator no stairs and was independent in all functional mobility and ADL's. walker for gait when needed. No steps that patient has to use at present. states minoo will be here for the next few days, however normally she lives alone and manages with all mobility and ADL's independently.

## 2020-01-18 NOTE — PRE-ANESTHESIA EVALUATION ADULT - NSANTHPMHFT_GEN_ALL_CORE
78 y/o female.  PMHx of HTN, HLD, hypothyroidism, Parkinson's, OA (s/p left THR 5/2019), s/p lumbar laminectomy with fusion on 3/1/19.  For her parkinsons, sees neurologist, took sinamet today, no respiratory issues or dysphagia, no resting tremor but mentions bradykinesia. No changes in her symptoms of late, well controlled.  Denies hx of CAD CHF CKD DM or CVA. No Cp or SOB.  C/o lower back pain radiating down RLE to right foot, progressively worsening over the past few months. Diagnostic imaging revealed lumbar disc protrusion at L3-4 levels. Evaluated by Dr. Patricia.   For a scheduled L3-4 right hemilaminotomy and discectomy on 1/18/2020.

## 2020-01-18 NOTE — PROGRESS NOTE ADULT - SUBJECTIVE AND OBJECTIVE BOX
Orthopaedic Surgery Post-Operative Progress Note    Pt reports pain is well controlled. Tolerated procedure well. Denies fevers, dizziness, CP, SOB, N/V, numbness/tingling, weakness, calf pain.    Labs:                        12.9   8.44  )-----------( 255      ( 18 Jan 2020 15:15 )             41.6     01-18    140  |  98  |  19  ----------------------------<  131<H>  3.4<L>   |  27  |  0.69    Ca    9.1      18 Jan 2020 11:35    Vitals:  T(C): 36.9 (01-18-20 @ 16:55), Max: 36.9 (01-18-20 @ 06:10)  HR: 80 (01-18-20 @ 16:55) (78 - 111)  BP: 128/72 (01-18-20 @ 16:55) (128/72 - 184/93)  RR: 18 (01-18-20 @ 16:55) (14 - 18)  SpO2: 98% (01-18-20 @ 16:55) (94% - 100%)    Physical Exam:  Gen: NAD, resting comfortably    Spine Exam:  Dressing clean, dry, intact  +HMV  Negative clonus  Negative babinski  Negative rodgers  Negative ulnar drift  No calf TTP    Motor:               C5           C6            C7               C8           T1   R         5/5          5/5            5/5             5/5          5/5  L          5/5          5/5            5/5             5/5          5/5                L2             L3             L4               L5            S1  R         5/5           4/5          5/5             5/5           5/5  L          5/5          5/5           5/5             5/5           5/5    Sensory:            C5         C6         C7      C8       T1        (0=absent, 1=impaired, 2=normal, NT=not testable)  R         2            2           2        2         2  L          2            2           2        2         2               L2          L3         L4      L5       S1         (0=absent, 1=impaired, 2=normal, NT=not testable)  R         2            2            2        2        2  L          2            2           2        2         2

## 2020-01-18 NOTE — PROGRESS NOTE ADULT - ASSESSMENT
Pt is a 79y Female POD 0 from L3-4 Hemilami/Disc.  -Stable  -Tolerated procedure well  -Pain Control  -Hold all chemical DVT PPx in setting of spinal surgery  -SCDs  -Monitor Drain output  -No steroids  -Encourage Incentive Spirometry  -WBAT  -PT/OT  -Dispo Planning  -Will discuss with attending and advise if plan changes    Rhona Lawrence M.D.  PGY-2 Orthopaedic Surgery

## 2020-01-18 NOTE — PHYSICAL THERAPY INITIAL EVALUATION ADULT - PERTINENT HX OF CURRENT PROBLEM, REHAB EVAL
80 y/o female with PMHx of HTN, HLD, hypothyroidism, Parkinson's, OA (s/p left THR 5/2019), s/p lumbar laminectomy with fusion on 3/1/19, c/o lower back pain radiating down RLE to right foot, progressively worsening over the past few months. Pt ambulates with a walker. Diagnostic imaging revealed lumbar disc protrusion at L3-4 levels. Evaluated by Dr. Patricia. Presents for a scheduled L3-4 right hemilaminotomy and discectomy on 1/18/2020.

## 2020-01-18 NOTE — PHYSICAL THERAPY INITIAL EVALUATION ADULT - CRITERIA FOR SKILLED THERAPEUTIC INTERVENTIONS
risk reduction/prevention/rehab potential/anticipated discharge recommendation/therapy frequency/anticipated equipment needs at discharge/impairments found/functional limitations in following categories/predicted duration of therapy intervention

## 2020-01-19 LAB
ANION GAP SERPL CALC-SCNC: 11 MMOL/L — SIGNIFICANT CHANGE UP (ref 5–17)
BASOPHILS # BLD AUTO: 0.09 K/UL — SIGNIFICANT CHANGE UP (ref 0–0.2)
BASOPHILS NFR BLD AUTO: 0.8 % — SIGNIFICANT CHANGE UP (ref 0–2)
BUN SERPL-MCNC: 16 MG/DL — SIGNIFICANT CHANGE UP (ref 7–23)
CALCIUM SERPL-MCNC: 9.1 MG/DL — SIGNIFICANT CHANGE UP (ref 8.4–10.5)
CHLORIDE SERPL-SCNC: 102 MMOL/L — SIGNIFICANT CHANGE UP (ref 96–108)
CO2 SERPL-SCNC: 27 MMOL/L — SIGNIFICANT CHANGE UP (ref 22–31)
CREAT SERPL-MCNC: 0.66 MG/DL — SIGNIFICANT CHANGE UP (ref 0.5–1.3)
EOSINOPHIL # BLD AUTO: 0.15 K/UL — SIGNIFICANT CHANGE UP (ref 0–0.5)
EOSINOPHIL NFR BLD AUTO: 1.3 % — SIGNIFICANT CHANGE UP (ref 0–6)
GLUCOSE SERPL-MCNC: 105 MG/DL — HIGH (ref 70–99)
HCT VFR BLD CALC: 38.5 % — SIGNIFICANT CHANGE UP (ref 34.5–45)
HGB BLD-MCNC: 12.4 G/DL — SIGNIFICANT CHANGE UP (ref 11.5–15.5)
IMM GRANULOCYTES NFR BLD AUTO: 0.3 % — SIGNIFICANT CHANGE UP (ref 0–1.5)
LYMPHOCYTES # BLD AUTO: 1.53 K/UL — SIGNIFICANT CHANGE UP (ref 1–3.3)
LYMPHOCYTES # BLD AUTO: 13.6 % — SIGNIFICANT CHANGE UP (ref 13–44)
MCHC RBC-ENTMCNC: 29.3 PG — SIGNIFICANT CHANGE UP (ref 27–34)
MCHC RBC-ENTMCNC: 32.2 GM/DL — SIGNIFICANT CHANGE UP (ref 32–36)
MCV RBC AUTO: 91 FL — SIGNIFICANT CHANGE UP (ref 80–100)
MONOCYTES # BLD AUTO: 1.07 K/UL — HIGH (ref 0–0.9)
MONOCYTES NFR BLD AUTO: 9.5 % — SIGNIFICANT CHANGE UP (ref 2–14)
NEUTROPHILS # BLD AUTO: 8.35 K/UL — HIGH (ref 1.8–7.4)
NEUTROPHILS NFR BLD AUTO: 74.5 % — SIGNIFICANT CHANGE UP (ref 43–77)
PLATELET # BLD AUTO: 261 K/UL — SIGNIFICANT CHANGE UP (ref 150–400)
POTASSIUM SERPL-MCNC: 4.1 MMOL/L — SIGNIFICANT CHANGE UP (ref 3.5–5.3)
POTASSIUM SERPL-SCNC: 4.1 MMOL/L — SIGNIFICANT CHANGE UP (ref 3.5–5.3)
RBC # BLD: 4.23 M/UL — SIGNIFICANT CHANGE UP (ref 3.8–5.2)
RBC # FLD: 13.9 % — SIGNIFICANT CHANGE UP (ref 10.3–14.5)
SODIUM SERPL-SCNC: 140 MMOL/L — SIGNIFICANT CHANGE UP (ref 135–145)
WBC # BLD: 11.22 K/UL — HIGH (ref 3.8–10.5)
WBC # FLD AUTO: 11.22 K/UL — HIGH (ref 3.8–10.5)

## 2020-01-19 RX ORDER — LEVOTHYROXINE SODIUM 125 MCG
100 TABLET ORAL DAILY
Refills: 0 | Status: DISCONTINUED | OUTPATIENT
Start: 2020-01-19 | End: 2020-01-19

## 2020-01-19 RX ORDER — LEVOTHYROXINE SODIUM 125 MCG
100 TABLET ORAL
Refills: 0 | Status: DISCONTINUED | OUTPATIENT
Start: 2020-01-19 | End: 2020-01-19

## 2020-01-19 RX ORDER — LEVOTHYROXINE SODIUM 125 MCG
50 TABLET ORAL
Refills: 0 | Status: DISCONTINUED | OUTPATIENT
Start: 2020-01-19 | End: 2020-01-20

## 2020-01-19 RX ORDER — CELECOXIB 200 MG/1
200 CAPSULE ORAL EVERY 12 HOURS
Refills: 0 | Status: DISCONTINUED | OUTPATIENT
Start: 2020-01-19 | End: 2020-01-20

## 2020-01-19 RX ORDER — LEVOTHYROXINE SODIUM 125 MCG
100 TABLET ORAL
Refills: 0 | Status: DISCONTINUED | OUTPATIENT
Start: 2020-01-19 | End: 2020-01-20

## 2020-01-19 RX ADMIN — OXYCODONE AND ACETAMINOPHEN 1 TABLET(S): 5; 325 TABLET ORAL at 00:18

## 2020-01-19 RX ADMIN — OXYCODONE AND ACETAMINOPHEN 1 TABLET(S): 5; 325 TABLET ORAL at 17:40

## 2020-01-19 RX ADMIN — PANTOPRAZOLE SODIUM 40 MILLIGRAM(S): 20 TABLET, DELAYED RELEASE ORAL at 05:28

## 2020-01-19 RX ADMIN — CELECOXIB 200 MILLIGRAM(S): 200 CAPSULE ORAL at 22:59

## 2020-01-19 RX ADMIN — Medication 1 TABLET(S): at 11:59

## 2020-01-19 RX ADMIN — CYCLOBENZAPRINE HYDROCHLORIDE 10 MILLIGRAM(S): 10 TABLET, FILM COATED ORAL at 14:13

## 2020-01-19 RX ADMIN — SENNA PLUS 2 TABLET(S): 8.6 TABLET ORAL at 22:29

## 2020-01-19 RX ADMIN — CARBIDOPA AND LEVODOPA 1 TABLET(S): 25; 100 TABLET ORAL at 22:29

## 2020-01-19 RX ADMIN — OXYCODONE AND ACETAMINOPHEN 1 TABLET(S): 5; 325 TABLET ORAL at 12:15

## 2020-01-19 RX ADMIN — OXYCODONE AND ACETAMINOPHEN 1 TABLET(S): 5; 325 TABLET ORAL at 22:44

## 2020-01-19 RX ADMIN — CYCLOBENZAPRINE HYDROCHLORIDE 10 MILLIGRAM(S): 10 TABLET, FILM COATED ORAL at 05:28

## 2020-01-19 RX ADMIN — OXYCODONE AND ACETAMINOPHEN 1 TABLET(S): 5; 325 TABLET ORAL at 05:28

## 2020-01-19 RX ADMIN — CYCLOBENZAPRINE HYDROCHLORIDE 10 MILLIGRAM(S): 10 TABLET, FILM COATED ORAL at 22:29

## 2020-01-19 RX ADMIN — OXYCODONE AND ACETAMINOPHEN 1 TABLET(S): 5; 325 TABLET ORAL at 18:25

## 2020-01-19 RX ADMIN — CELECOXIB 200 MILLIGRAM(S): 200 CAPSULE ORAL at 12:00

## 2020-01-19 RX ADMIN — OXYCODONE AND ACETAMINOPHEN 1 TABLET(S): 5; 325 TABLET ORAL at 11:45

## 2020-01-19 RX ADMIN — OXYCODONE AND ACETAMINOPHEN 1 TABLET(S): 5; 325 TABLET ORAL at 05:58

## 2020-01-19 RX ADMIN — CELECOXIB 200 MILLIGRAM(S): 200 CAPSULE ORAL at 22:29

## 2020-01-19 RX ADMIN — CARBIDOPA AND LEVODOPA 1 TABLET(S): 25; 100 TABLET ORAL at 05:27

## 2020-01-19 RX ADMIN — CARBIDOPA AND LEVODOPA 1 TABLET(S): 25; 100 TABLET ORAL at 14:13

## 2020-01-19 RX ADMIN — OXYCODONE AND ACETAMINOPHEN 1 TABLET(S): 5; 325 TABLET ORAL at 23:15

## 2020-01-19 RX ADMIN — ATORVASTATIN CALCIUM 40 MILLIGRAM(S): 80 TABLET, FILM COATED ORAL at 22:29

## 2020-01-19 NOTE — PROGRESS NOTE ADULT - SUBJECTIVE AND OBJECTIVE BOX
POST OPERATIVE DAY #:  [1]     Patient Resting without complaints /events overnight.  T(C): 36.6 (01-19-20 @ 04:28), Max: 36.9 (01-18-20 @ 16:55)  HR: 79 (01-19-20 @ 04:28) (76 - 111)  BP: 148/79 (01-19-20 @ 04:28) (117/68 - 181/74)  RR: 18 (01-19-20 @ 04:28) (14 - 18)  SpO2: 95% (01-19-20 @ 04:28) (93% - 100%)    Exam:   Alert/Oriented, No Acute Distress             Dressing: [x] clean/dry/intact with gauze and film           Drains: Hemovac was automatically self removed as of 1/19, no signs of hematoma.          Sensation: [x] intact to light touch          Motor exam: [x]                    [x] Lower extremity           PF          DF         EHL       FHL                                                                                 R        5/5        5/5        5/5       5/5                                             L         5/5        5/5        5/5       5/5                                                                  Calves Soft/Non-tender bilaterally          +DP pulses             LABS:                        12.9   8.44  )-----------( 255      ( 18 Jan 2020 15:15 )             41.6     01-19    140  |  102  |  16  ----------------------------<  105<H>  4.1   |  27  |  0.66    Ca    9.1      19 Jan 2020 07:16

## 2020-01-19 NOTE — PROGRESS NOTE ADULT - ASSESSMENT
A/P :  79y Female s/p L3-L4 mecca laminectomy discectomy   -    Pain control  -    F/U AM labs  -    DVT ppx: SCDs       -    Physical Therapy  -    Weight bearing status: WBAT [x]  -    Dispo Planning for home with home PT services        Mert Longoria PA-C  Orthopedic Surgery Team  Team Pager: #[phone]

## 2020-01-19 NOTE — PROVIDER CONTACT NOTE (OTHER) - ASSESSMENT
Pt OOB to bathroom and accidentally ripped out HMV from insertion site. No s/s of active bleeding. Pt returned back to bed safely. Bed alarm on.

## 2020-01-19 NOTE — PROGRESS NOTE ADULT - SUBJECTIVE AND OBJECTIVE BOX
I measured and fit Neris with a lso corset , however the anterior needs altering to accommodate her short stature.Delivery expected tomrrow.   [facility]

## 2020-01-20 ENCOUNTER — TRANSCRIPTION ENCOUNTER (OUTPATIENT)
Age: 80
End: 2020-01-20

## 2020-01-20 VITALS
HEART RATE: 85 BPM | DIASTOLIC BLOOD PRESSURE: 79 MMHG | TEMPERATURE: 98 F | RESPIRATION RATE: 18 BRPM | OXYGEN SATURATION: 99 % | SYSTOLIC BLOOD PRESSURE: 141 MMHG

## 2020-01-20 LAB
ANION GAP SERPL CALC-SCNC: 12 MMOL/L — SIGNIFICANT CHANGE UP (ref 5–17)
BASOPHILS # BLD AUTO: 0.12 K/UL — SIGNIFICANT CHANGE UP (ref 0–0.2)
BASOPHILS NFR BLD AUTO: 1.2 % — SIGNIFICANT CHANGE UP (ref 0–2)
BUN SERPL-MCNC: 18 MG/DL — SIGNIFICANT CHANGE UP (ref 7–23)
CALCIUM SERPL-MCNC: 9.1 MG/DL — SIGNIFICANT CHANGE UP (ref 8.4–10.5)
CHLORIDE SERPL-SCNC: 104 MMOL/L — SIGNIFICANT CHANGE UP (ref 96–108)
CO2 SERPL-SCNC: 26 MMOL/L — SIGNIFICANT CHANGE UP (ref 22–31)
CREAT SERPL-MCNC: 0.69 MG/DL — SIGNIFICANT CHANGE UP (ref 0.5–1.3)
EOSINOPHIL # BLD AUTO: 0.3 K/UL — SIGNIFICANT CHANGE UP (ref 0–0.5)
EOSINOPHIL NFR BLD AUTO: 3.1 % — SIGNIFICANT CHANGE UP (ref 0–6)
GLUCOSE SERPL-MCNC: 96 MG/DL — SIGNIFICANT CHANGE UP (ref 70–99)
HCT VFR BLD CALC: 35.8 % — SIGNIFICANT CHANGE UP (ref 34.5–45)
HGB BLD-MCNC: 11.6 G/DL — SIGNIFICANT CHANGE UP (ref 11.5–15.5)
IMM GRANULOCYTES NFR BLD AUTO: 0.4 % — SIGNIFICANT CHANGE UP (ref 0–1.5)
LYMPHOCYTES # BLD AUTO: 1.51 K/UL — SIGNIFICANT CHANGE UP (ref 1–3.3)
LYMPHOCYTES # BLD AUTO: 15.6 % — SIGNIFICANT CHANGE UP (ref 13–44)
MCHC RBC-ENTMCNC: 29.5 PG — SIGNIFICANT CHANGE UP (ref 27–34)
MCHC RBC-ENTMCNC: 32.4 GM/DL — SIGNIFICANT CHANGE UP (ref 32–36)
MCV RBC AUTO: 91.1 FL — SIGNIFICANT CHANGE UP (ref 80–100)
MONOCYTES # BLD AUTO: 0.99 K/UL — HIGH (ref 0–0.9)
MONOCYTES NFR BLD AUTO: 10.2 % — SIGNIFICANT CHANGE UP (ref 2–14)
NEUTROPHILS # BLD AUTO: 6.74 K/UL — SIGNIFICANT CHANGE UP (ref 1.8–7.4)
NEUTROPHILS NFR BLD AUTO: 69.5 % — SIGNIFICANT CHANGE UP (ref 43–77)
PLATELET # BLD AUTO: 246 K/UL — SIGNIFICANT CHANGE UP (ref 150–400)
POTASSIUM SERPL-MCNC: 3.9 MMOL/L — SIGNIFICANT CHANGE UP (ref 3.5–5.3)
POTASSIUM SERPL-SCNC: 3.9 MMOL/L — SIGNIFICANT CHANGE UP (ref 3.5–5.3)
RBC # BLD: 3.93 M/UL — SIGNIFICANT CHANGE UP (ref 3.8–5.2)
RBC # FLD: 13.7 % — SIGNIFICANT CHANGE UP (ref 10.3–14.5)
SODIUM SERPL-SCNC: 142 MMOL/L — SIGNIFICANT CHANGE UP (ref 135–145)
WBC # BLD: 9.7 K/UL — SIGNIFICANT CHANGE UP (ref 3.8–10.5)
WBC # FLD AUTO: 9.7 K/UL — SIGNIFICANT CHANGE UP (ref 3.8–10.5)

## 2020-01-20 PROCEDURE — C1889: CPT

## 2020-01-20 PROCEDURE — 85027 COMPLETE CBC AUTOMATED: CPT

## 2020-01-20 PROCEDURE — 72100 X-RAY EXAM L-S SPINE 2/3 VWS: CPT

## 2020-01-20 PROCEDURE — 88311 DECALCIFY TISSUE: CPT

## 2020-01-20 PROCEDURE — 88304 TISSUE EXAM BY PATHOLOGIST: CPT

## 2020-01-20 PROCEDURE — 97110 THERAPEUTIC EXERCISES: CPT

## 2020-01-20 PROCEDURE — 97161 PT EVAL LOW COMPLEX 20 MIN: CPT

## 2020-01-20 PROCEDURE — 80048 BASIC METABOLIC PNL TOTAL CA: CPT

## 2020-01-20 PROCEDURE — 97116 GAIT TRAINING THERAPY: CPT

## 2020-01-20 PROCEDURE — 97530 THERAPEUTIC ACTIVITIES: CPT

## 2020-01-20 RX ORDER — CELECOXIB 200 MG/1
1 CAPSULE ORAL
Qty: 28 | Refills: 0
Start: 2020-01-20 | End: 2020-02-02

## 2020-01-20 RX ORDER — LACTULOSE 10 G/15ML
1 SOLUTION ORAL
Qty: 0 | Refills: 0 | DISCHARGE

## 2020-01-20 RX ORDER — OXYCODONE HYDROCHLORIDE 5 MG/1
1 TABLET ORAL
Qty: 30 | Refills: 0
Start: 2020-01-20

## 2020-01-20 RX ORDER — SENNA PLUS 8.6 MG/1
2 TABLET ORAL
Qty: 0 | Refills: 0 | DISCHARGE
Start: 2020-01-20

## 2020-01-20 RX ORDER — ZINC OXIDE 200 MG/G
1 OINTMENT TOPICAL
Qty: 1 | Refills: 0
Start: 2020-01-20 | End: 2020-01-24

## 2020-01-20 RX ORDER — OXYCODONE HYDROCHLORIDE 5 MG/1
1 TABLET ORAL
Qty: 56 | Refills: 0

## 2020-01-20 RX ORDER — ZINC OXIDE 200 MG/G
1 OINTMENT TOPICAL
Refills: 0 | Status: DISCONTINUED | OUTPATIENT
Start: 2020-01-20 | End: 2020-01-20

## 2020-01-20 RX ADMIN — Medication 1 TABLET(S): at 11:29

## 2020-01-20 RX ADMIN — PANTOPRAZOLE SODIUM 40 MILLIGRAM(S): 20 TABLET, DELAYED RELEASE ORAL at 06:27

## 2020-01-20 RX ADMIN — CARBIDOPA AND LEVODOPA 1 TABLET(S): 25; 100 TABLET ORAL at 06:27

## 2020-01-20 RX ADMIN — OXYCODONE AND ACETAMINOPHEN 1 TABLET(S): 5; 325 TABLET ORAL at 15:43

## 2020-01-20 RX ADMIN — Medication 100 MICROGRAM(S): at 11:29

## 2020-01-20 RX ADMIN — OXYCODONE AND ACETAMINOPHEN 1 TABLET(S): 5; 325 TABLET ORAL at 06:32

## 2020-01-20 RX ADMIN — CYCLOBENZAPRINE HYDROCHLORIDE 10 MILLIGRAM(S): 10 TABLET, FILM COATED ORAL at 06:27

## 2020-01-20 RX ADMIN — CYCLOBENZAPRINE HYDROCHLORIDE 10 MILLIGRAM(S): 10 TABLET, FILM COATED ORAL at 14:02

## 2020-01-20 RX ADMIN — CARBIDOPA AND LEVODOPA 1 TABLET(S): 25; 100 TABLET ORAL at 14:02

## 2020-01-20 NOTE — CHART NOTE - NSCHARTNOTEFT_GEN_A_CORE
Fit and apply modified LSO. Orthosis fits well. Reviewed application skin precautions and care with patient and family. Written instructions and contact information given. To notify office with any issues questions or concerns.  Steve GARCIA  [facility]  [phone]

## 2020-01-20 NOTE — DISCHARGE NOTE PROVIDER - CARE PROVIDERS DIRECT ADDRESSES
[email or web address]

## 2020-01-20 NOTE — DISCHARGE NOTE PROVIDER - NSDCMRMEDTOKEN_GEN_ALL_CORE_FT
atorvastatin 40 mg oral tablet: 1 tab(s) orally once a day (at bedtime)  calcium (as calcium citrate) 500 mg oral tablet, effervescent: 1 tab(s) orally once a day  carbidopa-levodopa 25 mg-100 mg oral tablet: 1 tab(s) orally 3 times a day  Levoxyl 100 mcg (0.1 mg) oral tablet: 1 tab(s) orally 6 times a week 1/2 tab on Sundays  Lumbar Sacral Corset: Dx: L3-L4 mecca laminectomy/discectomy  FLORIAN: 99M  Melatonin 10 mg oral capsule: 1 cap(s) orally once a day (at bedtime)  Multiple Vitamins oral tablet: 1 tab(s) orally once a day   oxyCODONE 5 mg oral tablet: 1 tab(s) orally every 6 hours, As Needed   PriLOSEC OTC 20 mg oral delayed release tablet: 1 tab(s) orally once a day, As Needed  senna oral tablet: 2 tab(s) orally once a day (at bedtime) atorvastatin 40 mg oral tablet: 1 tab(s) orally once a day (at bedtime)  calcium (as calcium citrate) 500 mg oral tablet, effervescent: 1 tab(s) orally once a day  carbidopa-levodopa 25 mg-100 mg oral tablet: 1 tab(s) orally 3 times a day  celecoxib 200 mg oral capsule: 1 cap(s) orally every 12 hours MDD:2  Levoxyl 100 mcg (0.1 mg) oral tablet: 1 tab(s) orally 6 times a week 1/2 tab on Sundays  Lumbar Sacral Corset: Dx: L3-L4 mecca laminectomy/discectomy  FLORIAN: 99M  Melatonin 10 mg oral capsule: 1 cap(s) orally once a day (at bedtime)  Multiple Vitamins oral tablet: 1 tab(s) orally once a day   oxyCODONE 5 mg oral tablet: 1-2 tab(s) orally every 4-6 hours, As needed, Moderate to severe pain. MDD:8  PriLOSEC OTC 20 mg oral delayed release tablet: 1 tab(s) orally once a day, As Needed  senna oral tablet: 2 tab(s) orally once a day (at bedtime)  zinc oxide 20% topical ointment: 1 application topically 2 times a day

## 2020-01-20 NOTE — PROGRESS NOTE ADULT - SUBJECTIVE AND OBJECTIVE BOX
Patient is a 79y old  Female who presents with a chief complaint of Lumbar Herniated Disc (20 Jan 2020 10:47)      SUBJECTIVE / OVERNIGHT EVENTS:    Events noted.  CONSTITUTIONAL: No fever,  or fatigue  RESPIRATORY: No cough, wheezing,  No shortness of breath  CARDIOVASCULAR: No chest pain, palpitations, dizziness, or leg swelling  GASTROINTESTINAL: No abdominal or epigastric pain. No nausea, vomiting.  NEUROLOGICAL: No headaches,     MEDICATIONS  (STANDING):  atorvastatin 40 milliGRAM(s) Oral at bedtime  carbidopa/levodopa  25/100 1 Tablet(s) Oral three times a day  ceFAZolin   IVPB 2000 milliGRAM(s) IV Intermittent once  celecoxib 200 milliGRAM(s) Oral every 12 hours  cyclobenzaprine 10 milliGRAM(s) Oral every 8 hours  levothyroxine 50 MICROGram(s) Oral <User Schedule>  levothyroxine 100 MICROGram(s) Oral <User Schedule>  multivitamin 1 Tablet(s) Oral daily  pantoprazole    Tablet 40 milliGRAM(s) Oral before breakfast  senna 2 Tablet(s) Oral at bedtime  sodium chloride 0.9%. 1000 milliLiter(s) (75 mL/Hr) IV Continuous <Continuous>  zinc oxide 20% Ointment 1 Application(s) Topical two times a day    MEDICATIONS  (PRN):  magnesium hydroxide Suspension 30 milliLiter(s) Oral every 12 hours PRN Constipation  oxycodone    5 mG/acetaminophen 325 mG 1 Tablet(s) Oral every 4 hours PRN Moderate Pain (4 - 6)  oxycodone    5 mG/acetaminophen 325 mG 2 Tablet(s) Oral every 8 hours PRN Severe Pain (7 - 10)  prochlorperazine   IVPB 10 milliGRAM(s) IV Intermittent every 8 hours PRN Nausea/vomiting        CAPILLARY BLOOD GLUCOSE        I&O's Summary    19 Jan 2020 07:01  -  20 Jan 2020 07:00  --------------------------------------------------------  IN: 1550 mL / OUT: 0 mL / NET: 1550 mL    20 Jan 2020 07:01  -  20 Jan 2020 21:37  --------------------------------------------------------  IN: 560 mL / OUT: 0 mL / NET: 560 mL        PHYSICAL EXAM:  GENERAL: NAD  NECK: Supple, No JVD  CHEST/LUNG: Clear to auscultation bilaterally; No wheezing.  HEART: Regular rate and rhythm; No murmurs, rubs, or gallops  ABDOMEN: Soft, Nontender, Nondistended; Bowel sounds present  EXTREMITIES:   No edema  NEUROLOGY: AAO X 3      LABS:                        11.6   9.70  )-----------( 246      ( 20 Jan 2020 08:44 )             35.8     01-20    142  |  104  |  18  ----------------------------<  96  3.9   |  26  |  0.69    Ca    9.1      20 Jan 2020 07:04              CAPILLARY BLOOD GLUCOSE                    RADIOLOGY & ADDITIONAL TESTS:    Imaging Personally Reviewed:    Consultant(s) Notes Reviewed:      Care Discussed with Consultants/Other Providers:

## 2020-01-20 NOTE — DISCHARGE NOTE PROVIDER - NSDCCPTREATMENT_GEN_ALL_CORE_FT
PRINCIPAL PROCEDURE  Procedure: Hemilaminectomy, spine, lumbar, 1 level, with discectomy  Findings and Treatment:

## 2020-01-20 NOTE — PROGRESS NOTE ADULT - SUBJECTIVE AND OBJECTIVE BOX
Post op Day [2 ]    Patient resting without complaints.  No chest pain, SOB, N/V.    T(C): 36.8 (01-20-20 @ 04:25), Max: 37 (01-19-20 @ 21:37)  HR: 71 (01-20-20 @ 04:25) (71 - 89)  BP: 145/83 (01-20-20 @ 04:25) (119/71 - 153/68)  RR: 18 (01-20-20 @ 04:25) (18 - 18)  SpO2: 98% (01-20-20 @ 04:25) (93% - 98%)  Wt(kg): --    Exam:  Alert and Oriented, No Acute Distress  Back dsg c/d/i  Calves Soft, Non-tender bilaterally  +PF/DF/EHL/FHL bilat 5/5  SILT bilat                           12.4   11.22 )-----------( 261      ( 19 Jan 2020 09:31 )             38.5    01-19    140  |  102  |  16  ----------------------------<  105<H>  4.1   |  27  |  0.66    Ca    9.1      19 Jan 2020 07:16

## 2020-01-20 NOTE — DISCHARGE NOTE PROVIDER - NSDCFUADDINST_GEN_ALL_CORE_FT
Please follow up with Dr. Patricia 7-10 days after your discharge from the hospital (call for appointment).  PT-weight bearing as tolerated with brace for support.  Keep dressing clean and intact, have doctor remove staples/sutures post op day 14 (if applicable) and apply steristrips.  Please follow up with your PMD within 1 month for routine checkup.

## 2020-01-20 NOTE — DISCHARGE NOTE PROVIDER - CARE PROVIDER_API CALL
Tee Patricia)  Orthopedics  [address]  [place] [address]  Phone: [phone]  Fax: [phone]  Follow Up Time:

## 2020-01-20 NOTE — DISCHARGE NOTE PROVIDER - HOSPITAL COURSE
This is a 79 year old female with PMHx of Parkinsons, Thyroidectomy, MVP, LTHA, L4-L5 PSIF admitted to [facility] on 1/18/20 for an elective L3-L4 hemilaminectomy/discectomy. Surgery was uncomplicated.  Evaluated and treated by PT, recommended home with home PT.  Remain of hospital stay unremarkable, and patient discharged to home when PT cleared.

## 2020-01-20 NOTE — DISCHARGE NOTE PROVIDER - NSDCACTIVITY_GEN_ALL_CORE
Showering allowed/Do not make important decisions/Do not drive or operate machinery/Stairs allowed/Walking - Outdoors allowed/Walking - Indoors allowed/No heavy lifting/straining

## 2020-01-20 NOTE — DISCHARGE NOTE PROVIDER - NSDCFUSCHEDAPPT_GEN_ALL_CORE_FT
RENETTA STALEY ; 01/28/2020 ; [facility]  RENETTA STALEY ; 03/05/2020 ; [address] RENETTA STALEY ; 01/28/2020 ; [facility]  RENETTA STALEY ; 03/05/2020 ; [address] RENETTA STALEY ; 01/28/2020 ; [facility]  RENETTA STALEY ; 03/05/2020 ; [address]

## 2020-01-20 NOTE — DISCHARGE NOTE NURSING/CASE MANAGEMENT/SOCIAL WORK - PATIENT PORTAL LINK FT
You can access the FollowMyHealth Patient Portal offered by [facility] by registering at the following website: [email or web address]. By joining [facility]’s FollowMyHealth portal, you will also be able to view your health information using other applications (apps) compatible with our system.

## 2020-01-20 NOTE — PROGRESS NOTE ADULT - PROBLEM SELECTOR PLAN 1
PT/OT-WBAT, brace for suppport  IS  DVT PPx-venodynes  Pain Control  Continue Current Tx.  Dispo plan for home today.    Chema Martinez PA-C  Team Pager: [phone]

## 2020-01-21 NOTE — DISCUSSION/SUMMARY
[Home] : patient was discharged to home [FreeTextEntry1] : Spoke with patient regarding her hospital stay.  Patient stated that she had an elected spine surgery for lumbar herniated disc. Verbalized that she is feeling moderate pain as expected and is controlling it with prescribed pain medicine. Denies any fever or complaints at this time. Is tolerating food and medicine well with no issues.  Stated that she is currently making an appointment for physical therapy to come to her house as per instructions from her surgeon. Has follow up appointment with her surgeon. Patient stated that she will call if any questions or concerns.  [FreeTextEntry3] : Patient declined HFU at this time and will call if she wants to make an appointment.

## 2020-01-28 ENCOUNTER — APPOINTMENT (OUTPATIENT)
Dept: ORTHOPEDIC SURGERY | Facility: CLINIC | Age: 80
End: 2020-01-28
Payer: MEDICARE

## 2020-01-28 PROCEDURE — 99024 POSTOP FOLLOW-UP VISIT: CPT

## 2020-01-28 PROCEDURE — 72100 X-RAY EXAM L-S SPINE 2/3 VWS: CPT

## 2020-01-30 LAB — SURGICAL PATHOLOGY STUDY: SIGNIFICANT CHANGE UP

## 2020-02-06 ENCOUNTER — APPOINTMENT (OUTPATIENT)
Dept: ORTHOPEDIC SURGERY | Facility: CLINIC | Age: 80
End: 2020-02-06
Payer: MEDICARE

## 2020-02-06 ENCOUNTER — RX RENEWAL (OUTPATIENT)
Age: 80
End: 2020-02-06

## 2020-02-06 VITALS
WEIGHT: 115 LBS | HEART RATE: 91 BPM | BODY MASS INDEX: 22.88 KG/M2 | DIASTOLIC BLOOD PRESSURE: 90 MMHG | HEIGHT: 59.5 IN | SYSTOLIC BLOOD PRESSURE: 173 MMHG

## 2020-02-06 PROCEDURE — 99214 OFFICE O/P EST MOD 30 MIN: CPT

## 2020-02-06 PROCEDURE — 73502 X-RAY EXAM HIP UNI 2-3 VIEWS: CPT | Mod: RT

## 2020-02-06 NOTE — PHYSICAL EXAM
[Coxalgic] : coxalgic [Walker] : ambulates with walker [Wheelchair] : uses a wheelchair [LE] : Sensory: Intact in bilateral lower extremities [Ankle] : ankle 2+ and symmetric bilaterally [Knee] : patellar 2+ and symmetric bilaterally [DP] : dorsalis pedis 2+ and symmetric bilaterally [PT] : posterior tibial 2+ and symmetric bilaterally [de-identified] : The following radiographs were ordered and read by me during this patients visit. I reviewed each radiograph in detail with the patient and discussed the findings as highlighted below. \par AP and false profile views of the right hip and AP view of the pelvis confirm acute osteolysis of the femoral head, with shortening of the right LE. \par \par \par  [de-identified] : On general examination the patient is adequately groomed and nourished. The vital parameters are as recorded. \par There is no lymphedema or diffuse swelling, no varicose veins, no skin warmth/erythema/scars/swelling, no ulcers and no palpable lymph nodes or masses in both lower extremities. Bilateral pedal pulses are well palpable.\par Upper Extremity:\par Both right and left upper extremities are unremarkable in terms of skin rash, lesions, pigmentation, redness, tenderness, swelling, joint instability, abnormal deformity or crepitus. The overall range of motion, sensation, motor tone and strength testing are normal.\par \par Hip Exam:\par The gait is right stiff hip coxalgic.\par The patient has unequal leg lengths - right lower limb significant shortening\par Boo/Larissa test is 12 inches on the right and 6 inches on the left. \par Active SLR is 0 degrees on the right and 60 degrees on the left. The left hip demonstrates well healed surgical scar, the right hip demonstrates no scars and the skin has no signs of inflammation or tenderness. \par Both Hips have a range of motion that is symmetrical in flexion and extension of:\par Hip flexion:             Right 60 degrees                Left 100 degrees\par Hip abduction:      Right 20 degrees                  Left 40 degrees\par Hip adduction:      Right 0 degrees                    Left 20 degrees\par Internal rotation:      Right 0 degrees                   Left 20 degrees\par External rotation:    Right 20 degrees                  Left 40 degrees\par There is no flexion contracture, deformity or instability. \par Labral impingement tests are negative.\par Right hip flexor, abductor and extensor power is grade 4+.\par Left hip flexor, abductor and extensor power is grade 5.\par \par

## 2020-02-06 NOTE — CONSULT LETTER
[Dear  ___] : Dear  [unfilled], [Consult Closing:] : Thank you very much for allowing me to participate in the care of this patient.  If you have any questions, please do not hesitate to contact me. [Please see my note below.] : Please see my note below. [Consult Letter:] : I had the pleasure of evaluating your patient, [unfilled]. [FreeTextEntry2] : LORI HOPPER\par  [Sincerely,] : Sincerely, [FreeTextEntry3] : Bran Yu MD\par \par ______________________________________________\par [facility]: Hip/Knee Arthroplasty\par [address], [address], [place] [address]\par (t) [phone]\par (f) [phone]\par

## 2020-02-06 NOTE — DISCUSSION/SUMMARY
[de-identified] : Right hip acute osteolysis, s/p left total hip replacement, recent laminectomy 3 weeks ago. \par \par The natural history and treatment of avascular necrosis was discussed with the patient at length today. The spectrum of treatment including nonoperative modalities to surgical intervention was elucidated. Noninvasive and nonoperative treatment modalities include weight reduction, activity modification with low impact exercise, use of cane/crutches to offload the joint,  as needed use of acetaminophen or anti-inflammatory medications if tolerated, glucosamine/chondroitin supplements, and physical therapy.  Definitive surgical treatment can certainly include total joint arthroplasty. The risks and benefits of each treatment options was discussed and all questions were answered.\par In view of lack of adequate pain relief with conservative (non-surgical) management protocol including physical therapy, home exercises, weight loss, activity modification, NSAIDS; the patient is recommended to consider a RIGHT Total Hip Replacement. \par \par The risks, benefits, alternatives, implications, complications including but not limited to pain, stiffness, bleeding, limp, wound breakdown, infection, limb length discrepancy, dislocation, bone fracture, nerve and vascular compromise, implant wear and durability issues and rehabilitation were discussed and relevant questions were addressed. The possibility of recurrent pain, no improvement in pain and actual worsening of the pain were also mentioned in conversation with the patient. Medical complications related to the patient's general medical health including deep vein thrombosis, pulmonary embolus, heart attack, stroke, death and other complications from anesthesia were discussed as well. Anticoagulation prophylaxis medication options to address risks of deep vein thrombosis and pulmonary embolism were discussed and weighed against the risks of bleeding and wound healing complications. The patient elected Ecotrin/Xarelto prophylaxis with mechanical modalities.\par \par I have reviewed the plan of care as well as a model of a total hip replacement implant equivalent to the one that will be used for their total hip replacement.  The patient agrees with the plan of care, as well as the use of implants for their total hip replacement.  The patient wishes to proceed and will undergo preoperative medical evaluation and clearance, attend the preoperative educational class and will schedule surgery appropriately.\par \par The patient has been advised to schedule surgery in about four weeks, to allow proper recovery from her recent spinal surgery.

## 2020-02-06 NOTE — HISTORY OF PRESENT ILLNESS
Patient:   KEELEY TOBAR            MRN: [id number]            FIN: [id number]              Age:   60 years     Sex:  FEMALE     :  56   Associated Diagnoses:   None   Author:   APRYL RAMIREZ     Basic Information   Admit information:       Admitting Physician: NESHA ALLEN   History source:  Patient.      History of Present Illness   NOTES Ms. Tobar is a 59-year-old female referred for Myelodysplastic syndrome. She was originally seen in consultation by Dr Martin for anemia and leukopenia. She had an upper endoscopy on 18 and a gastritis with H. pylori was identified and treated with triple therapy. She was treated with IV iron infusions and received 4 treatments. She also completed 4 weeks of weekly vitamin B12, and subsequently just transition to monthly  administration. She continued to have cytopenias, and developed a rash. 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 She had a biopsy on 18 which revealed myelodysplastic syndrome with excess blasts. Disc megakaryocytes paresis and disc granulopoiesis was noted. Karotype was normal, 46, XX. She was then referred to Dr Maria Isabel Rodas for treatment. She will began decitabine.  Patient presented to the BMT clinic to discuss hematopoietic stem cell transplantation as a potential treatment for her myelodysplasia. On 19, a bone marrow biopsy was performed showing excess blasts. Her decided to not pursue SCT. She continued to follow up in the BMT clinic and continued to be pancytopenic. She completed cycle 1 Vidaza 120 mg 19 - 19 for a total of 7 doses. Complications of Vidaza included injection site  reaction and diffuse rash on both legs which have since healed. She requires frequent PRBC and platelet transfusions. She continues to report right-sided abdominal pain. She is seeing a general surgeon at [facility] who is performing additional testing.     Patient presented to the  BMT clinic on 10/2/19 for follow up. She reported right-sided abdominal pain, fatigue, dizziness, and palpitations. She was found to be hypotensive. She was afebrile. Continued to have diarrhea from her abdominal fistulas. Denied sweats, chills, shortness of breath, chest pain, palpitations, N/V, skin changes, vision changes, or bleeding. Hgb 6.8 and platelets were 12,000. She required PRBC and platelet transfusions.  Bone marrow biopsy was planned to rule out MDS converted to leukemia d/t continued thrombocytopenia. She was directly admitted to  on 10/2/19 for further testing and management. Infectious workup was negative. She was started on Zosyn and Vancomycin. Her abdominal pain and diarrhea improved. A bone marrow biopsy was performed on 10/3/19 showing increased blasts at 5%. Her BPs improved and she continued to require frequent blood product  transfusions. On 10/5/19 she developed shortness of breath with chest pain. Chest x-ray showed a left pleural effusion. She developed peripheral edema in the abdomen and bilateral lower extremities. She was given albumin/Lasix for a total of 3 doses. Infectious disease with consulted and Zosyn and Vancomycin were discontinued. She was started on Augmentin with Lomotil. She developed vaginal irritation and hemorrhoids. She was discharged home on  10/9/19 with improved BPs and instructions to complete Augmentin course at home along with ppx antibiotics.  She presented to [address] on 11/3/19 for Haplo-identical Stem Cell Transplantation. She received Busulfan this morning and tolerated well. She continues to have watery stool output from her abdominal fistulas. Reports abdominal tenderness is minimal. Remains afebrile. Denies shortness of breath, chest pain, palpitations, N/V, skin changes, vision changes, or bleeding.  11/5/19:  Haplo Day -6. Developed scalp pruritis last night. ICO ordered Benadryl which provided relief. Reports feeling dizzy. Midodrine was started  yesterday with improvement in BP readings. Reports abdominal fistula output and discomfort has improved. Will begin a high fiber diet to minimize watery stool output. Denies shortness of breath, chest pain, palpitations, N/V/D, vision changes, or bleeding. No evidence of opportunistic infection at  this time.  11/6/19: Haplo Day -5. No acute events overnight. She c/o itching on her scalp. Reports abdominal fistula output increased during the day after she drank the cholestyramine. Reports output has since improved. Denies abdominal pain or tenderness. Remains afebrile. VSS. Denies shortness of breath, chest pain, palpitations, vision changes, or bleeding.  11/7/19: Haplo Day -4. No acute events overnight. Patient continues to have abdominal pain which is relieved with Tramadol. C/o fatigue and nausea. Reports diarrhea from fistulas is unchanged. Remains afebrile. Denies shortness of breath, chest pain, palpitations, vomiting, skin changes, vision changes, or bleeding.  11/8/19: Haplo Day -3. Report pain from fistulas. She received Tramadol overnight which did not relief the pain. ICO was called and ordered a one time dose of Morphine IVP. She reports the output fluctuates from watery to semi formed. Reports last rectal BM was over 1 week ago. Reports some sweats overnight wihtout fevers. Remains afebrile. Denies shortness of breath, chest pain, palpitations, dysuria, vision changes, or bleeding. Fistulas  appear more inflammed and erythemic. ID to switch Zosyn to Vancomycin, Cefepime, and Flagyl.  11/9/19: Haplo Day -2. No acute events overnight. Reports moderate fatigue but she continues to ambulate. Patient reports stool output from fistulas is more formed. One fistula has a small amount of blood-streaked output. C/o abdominal pain and swelling. Remains afebrile. Denies shortness of breath, chest pain, palpitations, sore throat or mouth pain, or dysuria.  11/10/19: Haplo Day -1. Patient c/o moderate to severe pain  at the fistula areas and the skin surrounding. The skin around the fistulas is excoriated and bleeding. Reports intermittent nausea. Remains afebrile. VSS. Stool output is more formed. Denies shortness of breath, chest pain, palpitations, dysuria, or vision changes. Plan for stem cell infusion tomorrow, 11/11/19.   .       Review of Systems   Constitutional:  Fatigue.    Eye:  Negative.    Ear/Nose/Mouth/Throat:  Negative.    Cardiovascular:  Negative.    Respiratory:  Negative.    Gastrointestinal:  Nausea, Constipation.         Abdominal pain: Right, Upper quadrant, The severity is moderate.   Genitourinary:  Negative.    Musculoskeletal:  Negative.    Integumentary:  Negative.    Hematology/Lymphatics:  Bruising tendency.    Neurologic:  Alert and oriented X4, dizzy.    Endocrine:  Negative.    Allergy/Immunologic:  Immunocompromised, transplant.    Psychiatric:  Depression.      Physical Examination   VS/Measurements     Vitals between:   09-NOV-2019 06:56:28   TO   10-NOV-2019 06:56:28                   LAST RESULT MINIMUM MAXIMUM  Temperature 37.1 36.3 37.1  Heart Rate 69 62 77  Respiratory Rate 16 16 16  NISBP           103 93 114  NIDBP           46 43 57  NIMBP           65 60 76  SpO2                    97 96 100  , Measurements from flowsheet : Height and Weight   11/10/19 06:20 CST CLINICALWEIGHT 53.1 kg     Weight Method Measured     Weight Scale Standing    11/09/19 18:25 CST CLINICALWEIGHT 52.2 kg (Modified)   11/09/19 06:22 CST CLINICALWEIGHT 52.4 kg     Weight Method Measured     Weight Scale Standing        Intake and Output   I&O 24 hr   I & O between:  09-NOV-2019 06:56 TO 10-NOV-2019 06:56  Med Dosing Weight:  51.3  kg   03-NOV-2019  24 Hour Intake:   3012.00  ( 58.71 mL/kg )  24 Hour Output:   2550.00           24 Hour Urine/Stool Output:   0.0  24 Hour Balance:   462.00           24 Hour Urine Output:   2450.00  ( 1.99 mL/kg/hr )                    Stool Count:  1       General:  Alert and  oriented, No acute distress.    Eye:  Pupils are equal, round and reactive to light, Normal conjunctiva.   HENT:  Normocephalic.    Neck:  Supple, Non-tender.    Respiratory:  Lungs are clear to auscultation, Respirations are non-labored, Breath sounds are equal.   Cardiovascular:  Normal rate, Regular rhythm, No murmur.    Gastrointestinal:  4 open abdominal fistulas.    Genitourinary:  No costovertebral angle tenderness, No inguinal tenderness.   Lymphatics:  No lymphadenopathy neck, axilla, groin.    Musculoskeletal:  Normal range of motion, Normal strength.    Integumentary:  Warm, Dry.    Neurologic:  Alert, Oriented.    Cognition and Speech:  Oriented, Speech clear and coherent.    Psychiatric:  Cooperative, Appropriate mood & affect.      Review / Management   Laboratory results:     Labs between:  09-NOV-2019 06:56 to 10-NOV-2019 06:56  CBC:                 WBC  HgB  Hct  Plt  MCV  RDW   10-NOV-2019 (!) 0.6  (!) 5.3  (L) 16.9  (!) 11  90.4  (H) 18.8   DIFF:                 Seg  Neutroph//ABS  Lymph//ABS  Mono//ABS  EOS/ABS  10-NOV-2019 92  // (L) 0.6  // (L) 0.0  // (L) 0.0  // (L) 0.0   Drug Levels:                           Vancomycin                   Trough: 12.2                              .      Impression and Plan   Dx and Plan:  Diagnosis     MDS      - Diagnosed 5/7/18 bone marrow biopsy shows excess blasts       - Decitabine      - Bone marrow biopsy in 11/2018 shows excess blasts      - Cycle 1 Vidaza 120 mg 8/19/19 - 8/27/19 (total of 7 doses)       - Bone marrow biopsy on 10/3/19 shows increased blasts 5%    Haploidentical Stem Cell Transplant  - Day -7     - Busulfan  - Day -6     - Busulfan     - Fludarabine  - Day -5     - Busulfan      - Fludarabine  - Day -4     - Busulfan     - Fludarabine  - Day -3     - Fludarabine     - Cytoxan     - Mesna  - Day -2     - Fludarabine     - Cytoxan     - Mesna  - Day 0     - Stem Cell Reinfusion     - Infectious Disease Prophylaxis Begins        -  Micafungin 50 mg IVPB Daily        - Levaquin 500 mg PO Q24H        - Valtrex 500 mg PO Q12H  - Day +3      - Cytoxan     - Mesna  - Day +4     - Cytoxan      - Mesna  - Day +5     - GVHD/Rejection Prophylaxis Begins        - Tacrolimus         - Mycophenolate 1000 mg PO Q8H     - Growth Factors Begin        - Neupogen  - Day -1  Pre-Transplant Testing   Donor:    - A+    - CMV negative    - HSV I/II positive     - Toxoplasma negative   Patient:    - O+    - CMV positive    - HSV I/II positive    - Toxoplasma negative  Thrombocytopenia   - 2/2 MDS  - PRBC transfusion irradiated leukoreduced for hemoglobin <7.0  - Platelet transfusion irradiated leukoreduced for platelets <10,000 or actively bleeding  History of Appendix Cancer with fistulas  -Diarrhea - continue Lomotil  - Cefepime, Vancomycin, and Flagyl per ID starting on 11/8/19  Moderate Protein Calorie Malnutrition  - 2/2 abdominal fistula output  - Nutrition consult  Failure to Thrive  - Malnutrition and decreased physical activity  - Nutrition consult  - Physical Therapy  Hypotension, POA  - Midodrine 5 mg PO Q8H  Plan:  1. Increase oral intake  2. Increase physical activity  3. Continue Cefepime, Vancomycin, and Flagyl  4. Stem cell infusion 11/11/19  5. Ostomy nurse ordered for possibly a stoma powder  Discussed with Dr. Fly Robbins.        .     .   [de-identified] : .consult presents s/p left total hip replacement 5/15/2019, with worsening right hip pain and right LE pain. \par She has since her left hip replacement undergone lumbar spine L3-4 laminectomy. She was seen at her post operative visit on January 28, and recommended follow up back with me, due to findings of right hip AVN on her post operative spine imaging. She notes her right hip is now extremely painful, localized to the groin and anterior thigh. She notes she has recently over the last week been unable to bear weight to the right LE. \par She has a history of left hip AVN, which was treated with left total hip replacement, and notes her symptoms are similar. \par She presents for discussion of her condition, and to proceed with likely scheduling right total hip replacement. \par She is currently taking oxycodone for pain, and has been ambulating with a rolling walker, with use of a wheelchair for longer distances.  [Worsening] : worsening [10] : a current pain level of 10/10 [Walking] : worsened by walking [Constant] : ~He/She~ states the symptoms seem to be constant [Rest] : relieved by rest

## 2020-02-11 ENCOUNTER — OUTPATIENT (OUTPATIENT)
Dept: OUTPATIENT SERVICES | Facility: HOSPITAL | Age: 80
LOS: 1 days | End: 2020-02-11
Payer: MEDICARE

## 2020-02-11 VITALS
WEIGHT: 111.99 LBS | HEIGHT: 59 IN | DIASTOLIC BLOOD PRESSURE: 72 MMHG | OXYGEN SATURATION: 97 % | TEMPERATURE: 98 F | SYSTOLIC BLOOD PRESSURE: 130 MMHG | HEART RATE: 84 BPM | RESPIRATION RATE: 14 BRPM

## 2020-02-11 DIAGNOSIS — E89.0 POSTPROCEDURAL HYPOTHYROIDISM: Chronic | ICD-10-CM

## 2020-02-11 DIAGNOSIS — M19.90 UNSPECIFIED OSTEOARTHRITIS, UNSPECIFIED SITE: ICD-10-CM

## 2020-02-11 DIAGNOSIS — Z96.642 PRESENCE OF LEFT ARTIFICIAL HIP JOINT: Chronic | ICD-10-CM

## 2020-02-11 DIAGNOSIS — Z98.890 OTHER SPECIFIED POSTPROCEDURAL STATES: Chronic | ICD-10-CM

## 2020-02-11 DIAGNOSIS — G20 PARKINSON'S DISEASE: ICD-10-CM

## 2020-02-11 DIAGNOSIS — M16.11 UNILATERAL PRIMARY OSTEOARTHRITIS, RIGHT HIP: ICD-10-CM

## 2020-02-11 DIAGNOSIS — Z90.89 ACQUIRED ABSENCE OF OTHER ORGANS: Chronic | ICD-10-CM

## 2020-02-11 LAB
ANION GAP SERPL CALC-SCNC: 13 MMO/L — SIGNIFICANT CHANGE UP (ref 7–14)
APPEARANCE UR: SIGNIFICANT CHANGE UP
BACTERIA # UR AUTO: NEGATIVE — SIGNIFICANT CHANGE UP
BILIRUB UR-MCNC: NEGATIVE — SIGNIFICANT CHANGE UP
BLD GP AB SCN SERPL QL: NEGATIVE — SIGNIFICANT CHANGE UP
BLOOD UR QL VISUAL: SIGNIFICANT CHANGE UP
BUN SERPL-MCNC: 36 MG/DL — HIGH (ref 7–23)
CALCIUM SERPL-MCNC: 10.3 MG/DL — SIGNIFICANT CHANGE UP (ref 8.4–10.5)
CHLORIDE SERPL-SCNC: 98 MMOL/L — SIGNIFICANT CHANGE UP (ref 98–107)
CO2 SERPL-SCNC: 26 MMOL/L — SIGNIFICANT CHANGE UP (ref 22–31)
COD CRY URNS QL: SIGNIFICANT CHANGE UP (ref 0–0)
COLOR SPEC: YELLOW — SIGNIFICANT CHANGE UP
CREAT SERPL-MCNC: 0.87 MG/DL — SIGNIFICANT CHANGE UP (ref 0.5–1.3)
GLUCOSE SERPL-MCNC: 90 MG/DL — SIGNIFICANT CHANGE UP (ref 70–99)
GLUCOSE UR-MCNC: NEGATIVE — SIGNIFICANT CHANGE UP
HBA1C BLD-MCNC: 5.7 % — HIGH (ref 4–5.6)
HCT VFR BLD CALC: 40.1 % — SIGNIFICANT CHANGE UP (ref 34.5–45)
HGB BLD-MCNC: 12.5 G/DL — SIGNIFICANT CHANGE UP (ref 11.5–15.5)
KETONES UR-MCNC: SIGNIFICANT CHANGE UP
LEUKOCYTE ESTERASE UR-ACNC: SIGNIFICANT CHANGE UP
MCHC RBC-ENTMCNC: 28.3 PG — SIGNIFICANT CHANGE UP (ref 27–34)
MCHC RBC-ENTMCNC: 31.2 % — LOW (ref 32–36)
MCV RBC AUTO: 90.7 FL — SIGNIFICANT CHANGE UP (ref 80–100)
NITRITE UR-MCNC: NEGATIVE — SIGNIFICANT CHANGE UP
NRBC # FLD: 0 K/UL — SIGNIFICANT CHANGE UP (ref 0–0)
PH UR: 6 — SIGNIFICANT CHANGE UP (ref 5–8)
PLATELET # BLD AUTO: 353 K/UL — SIGNIFICANT CHANGE UP (ref 150–400)
PMV BLD: 10.4 FL — SIGNIFICANT CHANGE UP (ref 7–13)
POTASSIUM SERPL-MCNC: 4.8 MMOL/L — SIGNIFICANT CHANGE UP (ref 3.5–5.3)
POTASSIUM SERPL-SCNC: 4.8 MMOL/L — SIGNIFICANT CHANGE UP (ref 3.5–5.3)
PROT UR-MCNC: 30 — SIGNIFICANT CHANGE UP
RBC # BLD: 4.42 M/UL — SIGNIFICANT CHANGE UP (ref 3.8–5.2)
RBC # FLD: 13.9 % — SIGNIFICANT CHANGE UP (ref 10.3–14.5)
RBC CASTS # UR COMP ASSIST: HIGH (ref 0–?)
RH IG SCN BLD-IMP: POSITIVE — SIGNIFICANT CHANGE UP
SODIUM SERPL-SCNC: 137 MMOL/L — SIGNIFICANT CHANGE UP (ref 135–145)
SP GR SPEC: 1.03 — SIGNIFICANT CHANGE UP (ref 1–1.04)
SQUAMOUS # UR AUTO: SIGNIFICANT CHANGE UP
UROBILINOGEN FLD QL: NORMAL — SIGNIFICANT CHANGE UP
WBC # BLD: 9.59 K/UL — SIGNIFICANT CHANGE UP (ref 3.8–10.5)
WBC # FLD AUTO: 9.59 K/UL — SIGNIFICANT CHANGE UP (ref 3.8–10.5)
WBC UR QL: HIGH (ref 0–?)

## 2020-02-11 PROCEDURE — 93010 ELECTROCARDIOGRAM REPORT: CPT

## 2020-02-11 RX ORDER — CARBIDOPA AND LEVODOPA 25; 100 MG/1; MG/1
1 TABLET ORAL
Qty: 0 | Refills: 0 | DISCHARGE

## 2020-02-11 RX ORDER — CELECOXIB 200 MG/1
500 CAPSULE ORAL
Qty: 0 | Refills: 0 | DISCHARGE

## 2020-02-11 RX ORDER — OMEPRAZOLE 10 MG/1
1 CAPSULE, DELAYED RELEASE ORAL
Qty: 0 | Refills: 0 | DISCHARGE

## 2020-02-11 RX ORDER — ATORVASTATIN CALCIUM 80 MG/1
1 TABLET, FILM COATED ORAL
Qty: 0 | Refills: 0 | DISCHARGE

## 2020-02-11 RX ORDER — LEVOTHYROXINE SODIUM 125 MCG
1 TABLET ORAL
Qty: 0 | Refills: 0 | DISCHARGE

## 2020-02-11 RX ORDER — RASAGILINE 0.5 MG/1
1 TABLET ORAL
Qty: 0 | Refills: 0 | DISCHARGE

## 2020-02-11 RX ORDER — SODIUM CHLORIDE 9 MG/ML
1000 INJECTION, SOLUTION INTRAVENOUS
Refills: 0 | Status: DISCONTINUED | OUTPATIENT
Start: 2020-03-02 | End: 2020-03-03

## 2020-02-11 RX ORDER — LANOLIN ALCOHOL/MO/W.PET/CERES
1 CREAM (GRAM) TOPICAL
Qty: 0 | Refills: 0 | DISCHARGE

## 2020-02-11 RX ORDER — SODIUM CHLORIDE 9 MG/ML
3 INJECTION INTRAMUSCULAR; INTRAVENOUS; SUBCUTANEOUS EVERY 8 HOURS
Refills: 0 | Status: DISCONTINUED | OUTPATIENT
Start: 2020-03-02 | End: 2020-03-03

## 2020-02-11 RX ORDER — MULTIVIT-MIN/FERROUS GLUCONATE 9 MG/15 ML
1 LIQUID (ML) ORAL
Qty: 0 | Refills: 0 | DISCHARGE

## 2020-02-11 NOTE — H&P PST ADULT - NSICDXPROBLEM_GEN_ALL_CORE_FT
PROBLEM DIAGNOSES  Problem: OA (osteoarthritis)  Assessment and Plan:  Scheduled for right total hip replacement direct anterior approach on 03/02/2020.  Verbal and written pre-op instructions provided to patient. Patient verbalized understanding.   Pepcid for GI prophylaxis provided.   Patient given verbal and written instruction with teach back on chlorhexidine shampoo, and the patient verbalized understanding with return demonstration.   Patient will obtain medical clearance as per surgeons request-copy requested.     Problem: Parkinson's disease  Assessment and Plan: Pt. instructed to continue medications as prescribed.

## 2020-02-11 NOTE — H&P PST ADULT - HISTORY OF PRESENT ILLNESS
78 y/o female with PMHx of HTN, HLD, hypothyroidism, Parkinson's, avascular necrosis, OA (s/p left THR 5/2019), s/p lumbar laminectomy with fusion on 3/1/19,  s/p  L3-4 right hemilaminotomy and discectomy on 1/18/2020. She reports two to three months of right hip pain now scheduled for right total hip replacement direct anterior approach on 03/02/2020. intact

## 2020-02-11 NOTE — H&P PST ADULT - NSICDXPASTMEDICALHX_GEN_ALL_CORE_FT
PAST MEDICAL HISTORY:  MARK positive     Edema of lower extremity seen by PMD doppler done -as per patient normal    Essential hypertension currently not on medication    H/O Graves' disease s/p thyroidectomy    HLD (hyperlipidemia)     Hypothyroidism     MVP (mitral valve prolapse) pt reports asymptomatic, last Echo >10 years ago    OA (osteoarthritis)     Osteoporosis     Parkinson's disease dx 2012    Spondylolisthesis of lumbar region s/p lumbar laminectomy with fusion in 3/2019

## 2020-02-11 NOTE — H&P PST ADULT - ASSESSMENT
78 y/o female with PMHx of HTN, HLD, hypothyroidism, Parkinson's, avascular necrosis, OA (s/p left THR 5/2019), s/p lumbar laminectomy with fusion on 3/1/19,  s/p  L3-4 right hemilaminotomy and discectomy on 1/18/2020. She reports two to three months of right hip pain now scheduled for right total hip replacement direct anterior approach on 03/02/2020.

## 2020-02-11 NOTE — H&P PST ADULT - RS GEN PE MLT RESP DETAILS PC
good air movement/respirations non-labored/breath sounds equal/no wheezes/clear to auscultation bilaterally/airway patent

## 2020-02-12 LAB
BACTERIA UR CULT: SIGNIFICANT CHANGE UP
SPECIMEN SOURCE: SIGNIFICANT CHANGE UP

## 2020-02-13 LAB — SPECIMEN SOURCE: SIGNIFICANT CHANGE UP

## 2020-02-14 LAB — BACTERIA NPH CULT: SIGNIFICANT CHANGE UP

## 2020-02-21 ENCOUNTER — APPOINTMENT (OUTPATIENT)
Dept: INTERNAL MEDICINE | Facility: CLINIC | Age: 80
End: 2020-02-21
Payer: MEDICARE

## 2020-02-21 VITALS
WEIGHT: 117 LBS | BODY MASS INDEX: 23.28 KG/M2 | SYSTOLIC BLOOD PRESSURE: 144 MMHG | OXYGEN SATURATION: 97 % | DIASTOLIC BLOOD PRESSURE: 94 MMHG | HEART RATE: 91 BPM | HEIGHT: 59.5 IN | TEMPERATURE: 98.1 F

## 2020-02-21 VITALS — SYSTOLIC BLOOD PRESSURE: 140 MMHG | DIASTOLIC BLOOD PRESSURE: 90 MMHG

## 2020-02-21 DIAGNOSIS — R82.90 UNSPECIFIED ABNORMAL FINDINGS IN URINE: ICD-10-CM

## 2020-02-21 PROBLEM — M19.90 UNSPECIFIED OSTEOARTHRITIS, UNSPECIFIED SITE: Chronic | Status: ACTIVE | Noted: 2020-02-11

## 2020-02-21 PROBLEM — Z86.39 PERSONAL HISTORY OF OTHER ENDOCRINE, NUTRITIONAL AND METABOLIC DISEASE: Chronic | Status: ACTIVE | Noted: 2019-04-30

## 2020-02-21 PROCEDURE — 99214 OFFICE O/P EST MOD 30 MIN: CPT

## 2020-02-21 RX ORDER — ACETAMINOPHEN 500 MG/1
500 TABLET, COATED ORAL
Qty: 180 | Refills: 1 | Status: DISCONTINUED | COMMUNITY
Start: 2019-09-30 | End: 2020-02-21

## 2020-02-21 RX ORDER — LEVOTHYROXINE SODIUM 0.05 MG/1
50 TABLET ORAL
Qty: 5 | Refills: 0 | Status: DISCONTINUED | COMMUNITY
Start: 2019-03-25 | End: 2020-02-21

## 2020-02-21 RX ORDER — NAPROXEN 500 MG/1
500 TABLET ORAL
Qty: 60 | Refills: 1 | Status: DISCONTINUED | COMMUNITY
Start: 2019-11-04 | End: 2020-02-21

## 2020-02-21 RX ORDER — AMOXICILLIN 500 MG/1
500 CAPSULE ORAL
Qty: 4 | Refills: 0 | Status: DISCONTINUED | COMMUNITY
Start: 2019-07-23 | End: 2020-02-21

## 2020-02-23 LAB
APPEARANCE: CLEAR
BACTERIA UR CULT: NORMAL
BACTERIA: NEGATIVE
BILIRUBIN URINE: NEGATIVE
BLOOD URINE: NEGATIVE
COLOR: NORMAL
GLUCOSE QUALITATIVE U: NEGATIVE
HYALINE CASTS: 2 /LPF
KETONES URINE: NEGATIVE
LEUKOCYTE ESTERASE URINE: NEGATIVE
MICROSCOPIC-UA: NORMAL
NITRITE URINE: NEGATIVE
PH URINE: 6.5
PROTEIN URINE: NEGATIVE
RED BLOOD CELLS URINE: 2 /HPF
SPECIFIC GRAVITY URINE: 1.01
SQUAMOUS EPITHELIAL CELLS: 1 /HPF
UROBILINOGEN URINE: NORMAL
WHITE BLOOD CELLS URINE: 1 /HPF

## 2020-02-23 RX ORDER — OXYCODONE 5 MG/1
5 TABLET ORAL EVERY 6 HOURS
Qty: 120 | Refills: 0 | Status: DISCONTINUED | COMMUNITY
Start: 2019-11-06 | End: 2020-02-23

## 2020-02-23 NOTE — PHYSICAL EXAM
[No Acute Distress] : no acute distress [Well Nourished] : well nourished [Well Developed] : well developed [Well-Appearing] : well-appearing [Normal Voice/Communication] : normal voice/communication [Normal Sclera/Conjunctiva] : normal sclera/conjunctiva [PERRL] : pupils equal round and reactive to light [EOMI] : extraocular movements intact [Normal Outer Ear/Nose] : the outer ears and nose were normal in appearance [Normal Oropharynx] : the oropharynx was normal [Normal TMs] : both tympanic membranes were normal [No JVD] : no jugular venous distention [No Lymphadenopathy] : no lymphadenopathy [Thyroid Normal, No Nodules] : the thyroid was normal and there were no nodules present [Supple] : supple [No Respiratory Distress] : no respiratory distress  [No Accessory Muscle Use] : no accessory muscle use [Clear to Auscultation] : lungs were clear to auscultation bilaterally [Normal Percussion] : the chest was normal to percussion [Normal Rate] : normal rate  [Regular Rhythm] : with a regular rhythm [Normal S1, S2] : normal S1 and S2 [No Murmur] : no murmur heard [No Abdominal Bruit] : a ~M bruit was not heard ~T in the abdomen [No Carotid Bruits] : no carotid bruits [No Varicosities] : no varicosities [Pedal Pulses Present] : the pedal pulses are present [No Palpable Aorta] : no palpable aorta [No Edema] : there was no peripheral edema [No Extremity Clubbing/Cyanosis] : no extremity clubbing/cyanosis [Declined Breast Exam] : declined breast exam  [Soft] : abdomen soft [Non Tender] : non-tender [Non-distended] : non-distended [No Masses] : no abdominal mass palpated [No HSM] : no HSM [Normal Bowel Sounds] : normal bowel sounds [Normal Supraclavicular Nodes] : no supraclavicular lymphadenopathy [Normal Posterior Cervical Nodes] : no posterior cervical lymphadenopathy [Normal Axillary Nodes] : no axillary lymphadenopathy [Normal Anterior Cervical Nodes] : no anterior cervical lymphadenopathy [Normal Inguinal Nodes] : no inguinal lymphadenopathy [No CVA Tenderness] : no CVA  tenderness [No Spinal Tenderness] : no spinal tenderness [Grossly Normal Strength/Tone] : grossly normal strength/tone [No Joint Swelling] : no joint swelling [No Rash] : no rash [Coordination Grossly Intact] : coordination grossly intact [No Focal Deficits] : no focal deficits [Deep Tendon Reflexes (DTR)] : deep tendon reflexes were 2+ and symmetric [Speech Grossly Normal] : speech grossly normal [Memory Grossly Normal] : memory grossly normal [Normal Affect] : the affect was normal [Alert and Oriented x3] : oriented to person, place, and time [Normal Mood] : the mood was normal [Normal Insight/Judgement] : insight and judgment were intact [de-identified] : No joint swelling Pain with rotation of the right hip [de-identified] : Walking with a walker. Masked facies. Fine rest  tremor upper extremities. .Motor grossly 5 over 5

## 2020-02-23 NOTE — HISTORY OF PRESENT ILLNESS
[No Pertinent Cardiac History] : no history of aortic stenosis, atrial fibrillation, coronary artery disease, recent myocardial infarction, or implantable device/pacemaker [No Pertinent Pulmonary History] : no history of asthma, COPD, sleep apnea, or smoking [No Adverse Anesthesia Reaction] : no adverse anesthesia reaction in self or family member [(Patient denies any chest pain, claudication, dyspnea on exertion, orthopnea, palpitations or syncope)] : Patient denies any chest pain, claudication, dyspnea on exertion, orthopnea, palpitations or syncope [Moderate (4-6 METs)] : Moderate (4-6 METs) [Implantable Device/Pacemaker] : implantable device/pacemaker [Chronic Anticoagulation] : no chronic anticoagulation [Diabetes] : no diabetes [Chronic Kidney Disease] : no chronic kidney disease [FreeTextEntry2] : 3/2/2020 [FreeTextEntry3] : Dr Peterson [FreeTextEntry1] : Right total hip replacement [FreeTextEntry5] : left total hip replacement  [FreeTextEntry4] : She is planning right total hip replacement  due to intractable pain due to avascular necrosis. She is presently taking Celebrex b.i.d. and oxycodone at night. She is quite inactive due to the pain. She is walking with a walker. She has not had problems with prior surgeries including 2 recent lumbar laminectomies and left total hip replacement over the past year. She has no  bruising bleeding chest pain shortness of breath.  Her Parkinson's disease has been stable on her current dose of carbidopa levodopa. She has chronic constipation was treated with lactulose. Her hypothyroidism and hyperlipidemia are well controlled.

## 2020-02-23 NOTE — RESULTS/DATA
[] : results reviewed [de-identified] : Normal [de-identified] : Normal no acute changes [de-identified] : Urinalysis from February 11, 2020 reveals 11-25 red blood cells 6-10 white blood cells. A repeat urinalysis and culture was sent today [de-identified] : normal

## 2020-02-23 NOTE — ASSESSMENT
[Patient NOT optimized for Surgery at this time] : Patient not optimized for surgery at this time [Other: _____] : [unfilled] [Modify medications prior to procedure] : Modify medications prior to procedure [As per surgery] : as per surgery [FreeTextEntry4] : There is no contraindication to planned surgery and anesthesia. A repeat urinalysis and culture were sent and if the culture is positive we'll begin antibiotics prior to surgery. An addendum will be added to this note. The morning of surgery patient will take the Levoxyl carbidopa levodopa and atorvastatin.DVT prophylaxis as per surgery. Please continue her preoperative carbidopa levodopa Levoxyl postoperatively  as well as lactulose for chronic constipation.  Please  monitor her orthostatic blood pressure postoperatively as patient has had a history of orthostatic hypotension due to Parkinson's disease. Presently her blood pressure is borderline but adequate for surgery [FreeTextEntry7] : She will stop her Celebrex and multivitamin one week prior to OR

## 2020-02-24 ENCOUNTER — APPOINTMENT (OUTPATIENT)
Dept: INTERNAL MEDICINE | Facility: CLINIC | Age: 80
End: 2020-02-24

## 2020-02-27 NOTE — PHYSICAL THERAPY INITIAL EVALUATION ADULT - STRENGTHENING, PT EVAL
Greg Grimaldo is a 79 year old female presenting for consultation of Right side neck pain that comes and goes.  Referred by: Boaz Valerio DO  Rates pain: 0/10  Taking Tylenol for pain.    PCP: Shahab Mckinnon Jr., MD   Medications, allergies and tobacco use reviewed.  Denies known Latex allergy or symptoms of Latex sensitivity.    ALLERGIES:   Allergen Reactions   • Biaxin [Clarithromycin] RASH   • Flagyl [Metronidazole Hcl] RASH   • Ketek [Telithromycin] RASH   • Penicillins RASH   • Sulfa Drugs Cross Reactors RASH   • Vytorin [Ezetimibe-Simvastatin]      itching       REVIEW OF SYSTEMS:  CONSTITUTIONAL: no fever, weight changes, fatigue or drowsiness   HEENT: +neck pain and stiffness, voice change no dysphagia or vision changes  CARDIOVASCULAR: + Leg swelling and palpitations no chest pain or syncope   RESPIRATORY: + chest tightness, Shortness of breath and wheezing no cough  GI: no constipation, diarrhea, nausea, or vomiting   : no incontinence, urgency, or hesitancy  MUSCULOSKELETAL: as above  INTEGUMENTARY: no hypersensitivity, discoloration, or rash   NEURO: as above  ENDOCRINE: no temperature intolerance, polyuria, or polydipsia   HEME/LYMPH: no easy bruising, bleeding tendencies, lymphadenopathy   PSYCHIATRIC: no anxiety, depression or insomnia         Half grade increase in muscle strength x 4 extremities to improve functional mobility in 2 weeks

## 2020-02-28 NOTE — ASU PATIENT PROFILE, ADULT - PSH
H/O colonoscopy  2018  History of shoulder surgery  Right- long time ago  History of thyroidectomy, total  1992  History of tonsillectomy  childhood  S/P lumbar laminectomy  with fusion in 3/1/2019  Status post left hip replacement  5/15/2019

## 2020-02-28 NOTE — ASU PATIENT PROFILE, ADULT - PMH
MARK positive    Edema of lower extremity  seen by PMD doppler done -as per patient normal  Essential hypertension  currently not on medication  H/O Graves' disease  s/p thyroidectomy  HLD (hyperlipidemia)    Hypothyroidism    MVP (mitral valve prolapse)  pt reports asymptomatic, last Echo >10 years ago  OA (osteoarthritis)    Osteoporosis    Parkinson's disease  dx 2012  Spondylolisthesis of lumbar region  s/p lumbar laminectomy with fusion in 3/2019

## 2020-02-28 NOTE — ASU PATIENT PROFILE, ADULT - FALL HARM RISK
bones(Osteoporosis,prev fx,steroid use,metastatic bone ca walker/bones(Osteoporosis,prev fx,steroid use,metastatic bone ca/other

## 2020-03-01 ENCOUNTER — TRANSCRIPTION ENCOUNTER (OUTPATIENT)
Age: 80
End: 2020-03-01

## 2020-03-02 ENCOUNTER — APPOINTMENT (OUTPATIENT)
Dept: ORTHOPEDIC SURGERY | Facility: HOSPITAL | Age: 80
End: 2020-03-02

## 2020-03-02 ENCOUNTER — INPATIENT (INPATIENT)
Facility: HOSPITAL | Age: 80
LOS: 0 days | Discharge: NURSING, REHAB OR LONG-TERM CARE | End: 2020-03-03
Attending: ORTHOPAEDIC SURGERY | Admitting: ORTHOPAEDIC SURGERY
Payer: MEDICARE

## 2020-03-02 ENCOUNTER — RESULT REVIEW (OUTPATIENT)
Age: 80
End: 2020-03-02

## 2020-03-02 VITALS
TEMPERATURE: 98 F | OXYGEN SATURATION: 99 % | SYSTOLIC BLOOD PRESSURE: 191 MMHG | HEART RATE: 74 BPM | HEIGHT: 59 IN | RESPIRATION RATE: 16 BRPM | WEIGHT: 111.99 LBS | DIASTOLIC BLOOD PRESSURE: 77 MMHG

## 2020-03-02 DIAGNOSIS — Z96.642 PRESENCE OF LEFT ARTIFICIAL HIP JOINT: Chronic | ICD-10-CM

## 2020-03-02 DIAGNOSIS — Z98.890 OTHER SPECIFIED POSTPROCEDURAL STATES: Chronic | ICD-10-CM

## 2020-03-02 DIAGNOSIS — Z90.89 ACQUIRED ABSENCE OF OTHER ORGANS: Chronic | ICD-10-CM

## 2020-03-02 DIAGNOSIS — M16.11 UNILATERAL PRIMARY OSTEOARTHRITIS, RIGHT HIP: ICD-10-CM

## 2020-03-02 DIAGNOSIS — E89.0 POSTPROCEDURAL HYPOTHYROIDISM: Chronic | ICD-10-CM

## 2020-03-02 LAB
ANION GAP SERPL CALC-SCNC: 14 MMO/L — SIGNIFICANT CHANGE UP (ref 7–14)
BUN SERPL-MCNC: 15 MG/DL — SIGNIFICANT CHANGE UP (ref 7–23)
CALCIUM SERPL-MCNC: 8.8 MG/DL — SIGNIFICANT CHANGE UP (ref 8.4–10.5)
CHLORIDE SERPL-SCNC: 103 MMOL/L — SIGNIFICANT CHANGE UP (ref 98–107)
CO2 SERPL-SCNC: 23 MMOL/L — SIGNIFICANT CHANGE UP (ref 22–31)
CREAT SERPL-MCNC: 0.57 MG/DL — SIGNIFICANT CHANGE UP (ref 0.5–1.3)
GLUCOSE BLDC GLUCOMTR-MCNC: 91 MG/DL — SIGNIFICANT CHANGE UP (ref 70–99)
GLUCOSE SERPL-MCNC: 116 MG/DL — HIGH (ref 70–99)
HCT VFR BLD CALC: 34 % — LOW (ref 34.5–45)
HGB BLD-MCNC: 10.7 G/DL — LOW (ref 11.5–15.5)
MCHC RBC-ENTMCNC: 28.4 PG — SIGNIFICANT CHANGE UP (ref 27–34)
MCHC RBC-ENTMCNC: 31.5 % — LOW (ref 32–36)
MCV RBC AUTO: 90.2 FL — SIGNIFICANT CHANGE UP (ref 80–100)
NRBC # FLD: 0 K/UL — SIGNIFICANT CHANGE UP (ref 0–0)
PLATELET # BLD AUTO: 285 K/UL — SIGNIFICANT CHANGE UP (ref 150–400)
PMV BLD: 10.2 FL — SIGNIFICANT CHANGE UP (ref 7–13)
POTASSIUM SERPL-MCNC: 3.6 MMOL/L — SIGNIFICANT CHANGE UP (ref 3.5–5.3)
POTASSIUM SERPL-SCNC: 3.6 MMOL/L — SIGNIFICANT CHANGE UP (ref 3.5–5.3)
RBC # BLD: 3.77 M/UL — LOW (ref 3.8–5.2)
RBC # FLD: 14.3 % — SIGNIFICANT CHANGE UP (ref 10.3–14.5)
SODIUM SERPL-SCNC: 140 MMOL/L — SIGNIFICANT CHANGE UP (ref 135–145)
WBC # BLD: 10.68 K/UL — HIGH (ref 3.8–10.5)
WBC # FLD AUTO: 10.68 K/UL — HIGH (ref 3.8–10.5)

## 2020-03-02 PROCEDURE — 27130 TOTAL HIP ARTHROPLASTY: CPT | Mod: RT

## 2020-03-02 PROCEDURE — 73501 X-RAY EXAM HIP UNI 1 VIEW: CPT | Mod: 26,RT

## 2020-03-02 PROCEDURE — 88305 TISSUE EXAM BY PATHOLOGIST: CPT | Mod: 26

## 2020-03-02 PROCEDURE — 88311 DECALCIFY TISSUE: CPT | Mod: 26

## 2020-03-02 RX ORDER — POLYETHYLENE GLYCOL 3350 17 G/17G
17 POWDER, FOR SOLUTION ORAL DAILY
Refills: 0 | Status: DISCONTINUED | OUTPATIENT
Start: 2020-03-02 | End: 2020-03-03

## 2020-03-02 RX ORDER — HYDROMORPHONE HYDROCHLORIDE 2 MG/ML
0.25 INJECTION INTRAMUSCULAR; INTRAVENOUS; SUBCUTANEOUS
Refills: 0 | Status: DISCONTINUED | OUTPATIENT
Start: 2020-03-02 | End: 2020-03-03

## 2020-03-02 RX ORDER — SODIUM CHLORIDE 9 MG/ML
500 INJECTION INTRAMUSCULAR; INTRAVENOUS; SUBCUTANEOUS ONCE
Refills: 0 | Status: COMPLETED | OUTPATIENT
Start: 2020-03-03 | End: 2020-03-03

## 2020-03-02 RX ORDER — SODIUM CHLORIDE 9 MG/ML
500 INJECTION INTRAMUSCULAR; INTRAVENOUS; SUBCUTANEOUS ONCE
Refills: 0 | Status: COMPLETED | OUTPATIENT
Start: 2020-03-02 | End: 2020-03-02

## 2020-03-02 RX ORDER — ACETAMINOPHEN 500 MG
975 TABLET ORAL ONCE
Refills: 0 | Status: COMPLETED | OUTPATIENT
Start: 2020-03-02 | End: 2020-03-02

## 2020-03-02 RX ORDER — SODIUM CHLORIDE 9 MG/ML
1000 INJECTION, SOLUTION INTRAVENOUS
Refills: 0 | Status: DISCONTINUED | OUTPATIENT
Start: 2020-03-02 | End: 2020-03-03

## 2020-03-02 RX ORDER — ATORVASTATIN CALCIUM 80 MG/1
1 TABLET, FILM COATED ORAL
Qty: 0 | Refills: 0 | DISCHARGE

## 2020-03-02 RX ORDER — GABAPENTIN 400 MG/1
100 CAPSULE ORAL EVERY 8 HOURS
Refills: 0 | Status: DISCONTINUED | OUTPATIENT
Start: 2020-03-02 | End: 2020-03-03

## 2020-03-02 RX ORDER — MULTIVIT-MIN/FERROUS GLUCONATE 9 MG/15 ML
1 LIQUID (ML) ORAL
Qty: 0 | Refills: 0 | DISCHARGE

## 2020-03-02 RX ORDER — ACETAMINOPHEN 500 MG
975 TABLET ORAL EVERY 8 HOURS
Refills: 0 | Status: DISCONTINUED | OUTPATIENT
Start: 2020-03-02 | End: 2020-03-03

## 2020-03-02 RX ORDER — ONDANSETRON 8 MG/1
4 TABLET, FILM COATED ORAL EVERY 6 HOURS
Refills: 0 | Status: DISCONTINUED | OUTPATIENT
Start: 2020-03-02 | End: 2020-03-03

## 2020-03-02 RX ORDER — OXYCODONE HYDROCHLORIDE 5 MG/1
5 TABLET ORAL EVERY 4 HOURS
Refills: 0 | Status: DISCONTINUED | OUTPATIENT
Start: 2020-03-02 | End: 2020-03-03

## 2020-03-02 RX ORDER — KETOROLAC TROMETHAMINE 30 MG/ML
15 SYRINGE (ML) INJECTION EVERY 6 HOURS
Refills: 0 | Status: DISCONTINUED | OUTPATIENT
Start: 2020-03-02 | End: 2020-03-03

## 2020-03-02 RX ORDER — DEXAMETHASONE 0.5 MG/5ML
10 ELIXIR ORAL ONCE
Refills: 0 | Status: COMPLETED | OUTPATIENT
Start: 2020-03-02 | End: 2020-03-02

## 2020-03-02 RX ORDER — SENNA PLUS 8.6 MG/1
2 TABLET ORAL AT BEDTIME
Refills: 0 | Status: DISCONTINUED | OUTPATIENT
Start: 2020-03-02 | End: 2020-03-03

## 2020-03-02 RX ORDER — LEVOTHYROXINE SODIUM 125 MCG
0.5 TABLET ORAL
Qty: 0 | Refills: 0 | DISCHARGE

## 2020-03-02 RX ORDER — TRAMADOL HYDROCHLORIDE 50 MG/1
50 TABLET ORAL ONCE
Refills: 0 | Status: DISCONTINUED | OUTPATIENT
Start: 2020-03-02 | End: 2020-03-02

## 2020-03-02 RX ORDER — HYDROMORPHONE HYDROCHLORIDE 2 MG/ML
0.5 INJECTION INTRAMUSCULAR; INTRAVENOUS; SUBCUTANEOUS EVERY 4 HOURS
Refills: 0 | Status: DISCONTINUED | OUTPATIENT
Start: 2020-03-02 | End: 2020-03-03

## 2020-03-02 RX ORDER — PANTOPRAZOLE SODIUM 20 MG/1
40 TABLET, DELAYED RELEASE ORAL ONCE
Refills: 0 | Status: COMPLETED | OUTPATIENT
Start: 2020-03-02 | End: 2020-03-02

## 2020-03-02 RX ORDER — ATORVASTATIN CALCIUM 80 MG/1
40 TABLET, FILM COATED ORAL AT BEDTIME
Refills: 0 | Status: DISCONTINUED | OUTPATIENT
Start: 2020-03-02 | End: 2020-03-03

## 2020-03-02 RX ORDER — OXYCODONE HYDROCHLORIDE 5 MG/1
2.5 TABLET ORAL EVERY 4 HOURS
Refills: 0 | Status: DISCONTINUED | OUTPATIENT
Start: 2020-03-02 | End: 2020-03-03

## 2020-03-02 RX ORDER — LEVOTHYROXINE SODIUM 125 MCG
100 TABLET ORAL DAILY
Refills: 0 | Status: DISCONTINUED | OUTPATIENT
Start: 2020-03-02 | End: 2020-03-03

## 2020-03-02 RX ORDER — LANOLIN ALCOHOL/MO/W.PET/CERES
1 CREAM (GRAM) TOPICAL
Qty: 0 | Refills: 0 | DISCHARGE

## 2020-03-02 RX ORDER — HYDROMORPHONE HYDROCHLORIDE 2 MG/ML
0.5 INJECTION INTRAMUSCULAR; INTRAVENOUS; SUBCUTANEOUS
Refills: 0 | Status: DISCONTINUED | OUTPATIENT
Start: 2020-03-02 | End: 2020-03-03

## 2020-03-02 RX ORDER — OXYCODONE HYDROCHLORIDE 5 MG/1
10 TABLET ORAL EVERY 4 HOURS
Refills: 0 | Status: DISCONTINUED | OUTPATIENT
Start: 2020-03-02 | End: 2020-03-03

## 2020-03-02 RX ORDER — ASPIRIN/CALCIUM CARB/MAGNESIUM 324 MG
81 TABLET ORAL
Refills: 0 | Status: DISCONTINUED | OUTPATIENT
Start: 2020-03-02 | End: 2020-03-03

## 2020-03-02 RX ORDER — PANTOPRAZOLE SODIUM 20 MG/1
40 TABLET, DELAYED RELEASE ORAL
Refills: 0 | Status: DISCONTINUED | OUTPATIENT
Start: 2020-03-02 | End: 2020-03-03

## 2020-03-02 RX ORDER — TRAMADOL HYDROCHLORIDE 50 MG/1
50 TABLET ORAL EVERY 8 HOURS
Refills: 0 | Status: DISCONTINUED | OUTPATIENT
Start: 2020-03-02 | End: 2020-03-03

## 2020-03-02 RX ORDER — LEVOTHYROXINE SODIUM 125 MCG
1 TABLET ORAL
Qty: 0 | Refills: 0 | DISCHARGE

## 2020-03-02 RX ORDER — CARBIDOPA AND LEVODOPA 25; 100 MG/1; MG/1
1 TABLET ORAL THREE TIMES A DAY
Refills: 0 | Status: DISCONTINUED | OUTPATIENT
Start: 2020-03-02 | End: 2020-03-03

## 2020-03-02 RX ORDER — DEXAMETHASONE 0.5 MG/5ML
10 ELIXIR ORAL ONCE
Refills: 0 | Status: COMPLETED | OUTPATIENT
Start: 2020-03-03 | End: 2020-03-03

## 2020-03-02 RX ORDER — CEFAZOLIN SODIUM 1 G
2000 VIAL (EA) INJECTION EVERY 8 HOURS
Refills: 0 | Status: COMPLETED | OUTPATIENT
Start: 2020-03-02 | End: 2020-03-03

## 2020-03-02 RX ORDER — GABAPENTIN 400 MG/1
100 CAPSULE ORAL ONCE
Refills: 0 | Status: COMPLETED | OUTPATIENT
Start: 2020-03-02 | End: 2020-03-02

## 2020-03-02 RX ADMIN — SODIUM CHLORIDE 125 MILLILITER(S): 9 INJECTION, SOLUTION INTRAVENOUS at 13:00

## 2020-03-02 RX ADMIN — SODIUM CHLORIDE 500 MILLILITER(S): 9 INJECTION INTRAMUSCULAR; INTRAVENOUS; SUBCUTANEOUS at 16:57

## 2020-03-02 RX ADMIN — TRAMADOL HYDROCHLORIDE 50 MILLIGRAM(S): 50 TABLET ORAL at 09:13

## 2020-03-02 RX ADMIN — Medication 81 MILLIGRAM(S): at 18:24

## 2020-03-02 RX ADMIN — PANTOPRAZOLE SODIUM 40 MILLIGRAM(S): 20 TABLET, DELAYED RELEASE ORAL at 09:12

## 2020-03-02 RX ADMIN — Medication 975 MILLIGRAM(S): at 09:12

## 2020-03-02 RX ADMIN — GABAPENTIN 100 MILLIGRAM(S): 400 CAPSULE ORAL at 09:12

## 2020-03-02 RX ADMIN — Medication 100 MILLIGRAM(S): at 18:25

## 2020-03-02 RX ADMIN — TRAMADOL HYDROCHLORIDE 50 MILLIGRAM(S): 50 TABLET ORAL at 09:12

## 2020-03-02 RX ADMIN — ATORVASTATIN CALCIUM 40 MILLIGRAM(S): 80 TABLET, FILM COATED ORAL at 22:23

## 2020-03-02 RX ADMIN — SENNA PLUS 2 TABLET(S): 8.6 TABLET ORAL at 22:20

## 2020-03-02 RX ADMIN — CARBIDOPA AND LEVODOPA 1 TABLET(S): 25; 100 TABLET ORAL at 18:24

## 2020-03-02 RX ADMIN — Medication 975 MILLIGRAM(S): at 09:13

## 2020-03-02 RX ADMIN — Medication 975 MILLIGRAM(S): at 22:20

## 2020-03-02 RX ADMIN — GABAPENTIN 100 MILLIGRAM(S): 400 CAPSULE ORAL at 22:20

## 2020-03-02 RX ADMIN — TRAMADOL HYDROCHLORIDE 50 MILLIGRAM(S): 50 TABLET ORAL at 22:20

## 2020-03-02 RX ADMIN — SODIUM CHLORIDE 30 MILLILITER(S): 9 INJECTION, SOLUTION INTRAVENOUS at 09:12

## 2020-03-02 RX ADMIN — Medication 15 MILLIGRAM(S): at 18:24

## 2020-03-02 RX ADMIN — SODIUM CHLORIDE 3 MILLILITER(S): 9 INJECTION INTRAMUSCULAR; INTRAVENOUS; SUBCUTANEOUS at 22:19

## 2020-03-02 NOTE — PHYSICAL THERAPY INITIAL EVALUATION ADULT - ADDITIONAL COMMENTS
Patient report lives alone in apartment, elevator access, 4 steps to enter, ambulated with a walker.

## 2020-03-02 NOTE — OCCUPATIONAL THERAPY INITIAL EVALUATION ADULT - PLANNED THERAPY INTERVENTIONS, OT EVAL
ADL retraining/balance training/transfer training/strengthening/neuromuscular re-education/ROM/bed mobility training

## 2020-03-02 NOTE — OCCUPATIONAL THERAPY INITIAL EVALUATION ADULT - HOME MANAGEMENT SKILLS, PREVIOUS LEVEL OF FUNCTION, OT EVAL
Pt. states she has a "cleaning lady" who comes 3 days/week x 2 hours/day to assist her as needed./needed assist

## 2020-03-02 NOTE — OCCUPATIONAL THERAPY INITIAL EVALUATION ADULT - MD ORDER
Occupational Therapy (OT) to evaluate and treat. Ambulate as Tolerated. Ambulate with walker. Out of Bed to Chair. Occupational Therapy (OT) to evaluate and treat. Ambulate as Tolerated. Ambulate with walker. Out of Bed to Chair. Per LUIS CARLOS Sosa, pt is okay to participate in OT evaluation and perform activity as tolerated.

## 2020-03-02 NOTE — PHYSICAL THERAPY INITIAL EVALUATION ADULT - ACTIVE RANGE OF MOTION EXAMINATION, REHAB EVAL
Left LE Active ROM was WFL (within functional limits)/hip flexion 0-80, knee flexion 0-90, ankle WFL/bilateral upper extremity Active ROM was WFL (within functional limits)

## 2020-03-02 NOTE — OCCUPATIONAL THERAPY INITIAL EVALUATION ADULT - PERTINENT HX OF CURRENT PROBLEM, REHAB EVAL
Pt is a 79 year old female with hx of HTN, HLD, hypothyroidism, Parkinson's, avascular necrosis, OA (s/p Left THR 5/2019), s/p lumbar laminectomy with fusion on 3/1/19, s/p L3-4 right hemilaminotomy and discectomy on 1/18/2020. Pt reports two to three months of right hip pain. Pt is now s/p right total hip replacement direct anterior approach on 03/02/2020.

## 2020-03-02 NOTE — OCCUPATIONAL THERAPY INITIAL EVALUATION ADULT - GENERAL OBSERVATIONS, REHAB EVAL
Pt. received semisupine on stretcher from PACU outside room 9T 918A. LUIS CARLOS Sosa present. No acute distress. Patient agreed to evaluation from Occupational Therapist. +Clean dry intact dressing to Right Hip, +IV.

## 2020-03-02 NOTE — PHYSICAL THERAPY INITIAL EVALUATION ADULT - PLANNED THERAPY INTERVENTIONS, PT EVAL
gait training/strengthening/bed mobility training/balance training/stairs training/transfer training

## 2020-03-02 NOTE — OCCUPATIONAL THERAPY INITIAL EVALUATION ADULT - DIAGNOSIS, OT EVAL
s/p right total hip replacement direct anterior approach s/p right total hip replacement direct anterior approach; Decreased functional mobility; Decreased ADL management

## 2020-03-02 NOTE — OCCUPATIONAL THERAPY INITIAL EVALUATION ADULT - LIVES WITH, PROFILE
Pt. reports she lives alone in an apartment building with 4 steps to enter. Once inside, pt. reports she has an elevator available to reach apartment located on the second floor. Per pt., she has a bathtub in her bathroom with grab bar and shower chair available.

## 2020-03-02 NOTE — PROGRESS NOTE ADULT - SUBJECTIVE AND OBJECTIVE BOX
Ortho Post-op    Patient is seen and examined at bedside. Denies CP/SOB/Dizziness/N/V/D/HA. Pain is controlled.     Vital Signs Last 24 Hrs  T(C): 36.5 (02 Mar 2020 15:11), Max: 37.4 (02 Mar 2020 12:40)  T(F): 97.7 (02 Mar 2020 15:11), Max: 99.3 (02 Mar 2020 12:40)  HR: 68 (02 Mar 2020 15:11) (66 - 75)  BP: 139/57 (02 Mar 2020 15:11) (122/55 - 191/77)  BP(mean): 86 (02 Mar 2020 14:45) (66 - 95)  RR: 16 (02 Mar 2020 15:11) (10 - 17)  SpO2: 99% (02 Mar 2020 15:11) (96% - 99%)    GEN: NAD     RLE: Dressing C/D/I.    Motor intact + EHL/FHL/TA/GS. Sensation is grossly intact distal . Extremity warm. Compartments are soft. DP 2+    Labs:                          10.7   10.68 )-----------( 285      ( 02 Mar 2020 12:45 )             34.0       03-02    140  |  103  |  15  ----------------------------<  116<H>  3.6   |  23  |  0.57    Ca    8.8      02 Mar 2020 12:45        A/P: Patient is a 79y y/o Female s/p right total hip arthroplasty     -Pain control/analgesia  -Inc spirometry  -Venodynes/foot pumps  -F/U AM Labs  -PT/OT/WBAT  -Antibiotic periop  -Anticoagulation - A81 BID  -anterior Hip precautions

## 2020-03-02 NOTE — PATIENT PROFILE ADULT - BRADEN SENSORY
Pt complains of abdominal distention with weight loss  BLE edema and abdominal distention noted.  A&Ox3, (4) no impairment

## 2020-03-03 ENCOUNTER — TRANSCRIPTION ENCOUNTER (OUTPATIENT)
Age: 80
End: 2020-03-03

## 2020-03-03 VITALS
OXYGEN SATURATION: 98 % | HEART RATE: 80 BPM | SYSTOLIC BLOOD PRESSURE: 140 MMHG | DIASTOLIC BLOOD PRESSURE: 52 MMHG | RESPIRATION RATE: 16 BRPM | TEMPERATURE: 98 F

## 2020-03-03 DIAGNOSIS — E78.5 HYPERLIPIDEMIA, UNSPECIFIED: ICD-10-CM

## 2020-03-03 DIAGNOSIS — D62 ACUTE POSTHEMORRHAGIC ANEMIA: ICD-10-CM

## 2020-03-03 DIAGNOSIS — E03.9 HYPOTHYROIDISM, UNSPECIFIED: ICD-10-CM

## 2020-03-03 DIAGNOSIS — M87.00 IDIOPATHIC ASEPTIC NECROSIS OF UNSPECIFIED BONE: ICD-10-CM

## 2020-03-03 LAB
ANION GAP SERPL CALC-SCNC: 13 MMO/L — SIGNIFICANT CHANGE UP (ref 7–14)
BUN SERPL-MCNC: 16 MG/DL — SIGNIFICANT CHANGE UP (ref 7–23)
CALCIUM SERPL-MCNC: 8.7 MG/DL — SIGNIFICANT CHANGE UP (ref 8.4–10.5)
CHLORIDE SERPL-SCNC: 103 MMOL/L — SIGNIFICANT CHANGE UP (ref 98–107)
CO2 SERPL-SCNC: 23 MMOL/L — SIGNIFICANT CHANGE UP (ref 22–31)
CREAT SERPL-MCNC: 0.6 MG/DL — SIGNIFICANT CHANGE UP (ref 0.5–1.3)
GLUCOSE SERPL-MCNC: 107 MG/DL — HIGH (ref 70–99)
HCT VFR BLD CALC: 29.2 % — LOW (ref 34.5–45)
HGB BLD-MCNC: 9.5 G/DL — LOW (ref 11.5–15.5)
MCHC RBC-ENTMCNC: 28.8 PG — SIGNIFICANT CHANGE UP (ref 27–34)
MCHC RBC-ENTMCNC: 32.5 % — SIGNIFICANT CHANGE UP (ref 32–36)
MCV RBC AUTO: 88.5 FL — SIGNIFICANT CHANGE UP (ref 80–100)
NRBC # FLD: 0 K/UL — SIGNIFICANT CHANGE UP (ref 0–0)
PLATELET # BLD AUTO: 261 K/UL — SIGNIFICANT CHANGE UP (ref 150–400)
PMV BLD: 10.4 FL — SIGNIFICANT CHANGE UP (ref 7–13)
POTASSIUM SERPL-MCNC: 3.9 MMOL/L — SIGNIFICANT CHANGE UP (ref 3.5–5.3)
POTASSIUM SERPL-SCNC: 3.9 MMOL/L — SIGNIFICANT CHANGE UP (ref 3.5–5.3)
RBC # BLD: 3.3 M/UL — LOW (ref 3.8–5.2)
RBC # FLD: 14.3 % — SIGNIFICANT CHANGE UP (ref 10.3–14.5)
SODIUM SERPL-SCNC: 139 MMOL/L — SIGNIFICANT CHANGE UP (ref 135–145)
WBC # BLD: 13.59 K/UL — HIGH (ref 3.8–10.5)
WBC # FLD AUTO: 13.59 K/UL — HIGH (ref 3.8–10.5)

## 2020-03-03 PROCEDURE — 99223 1ST HOSP IP/OBS HIGH 75: CPT

## 2020-03-03 PROCEDURE — 99238 HOSP IP/OBS DSCHRG MGMT 30/<: CPT

## 2020-03-03 RX ORDER — TRAMADOL HYDROCHLORIDE 50 MG/1
1 TABLET ORAL
Qty: 0 | Refills: 0 | DISCHARGE
Start: 2020-03-03

## 2020-03-03 RX ORDER — SENNA PLUS 8.6 MG/1
2 TABLET ORAL
Qty: 0 | Refills: 0 | DISCHARGE
Start: 2020-03-03

## 2020-03-03 RX ORDER — OXYCODONE HYDROCHLORIDE 5 MG/1
1 TABLET ORAL
Qty: 0 | Refills: 0 | DISCHARGE

## 2020-03-03 RX ORDER — ACETAMINOPHEN 500 MG
3 TABLET ORAL
Qty: 0 | Refills: 0 | DISCHARGE
Start: 2020-03-03

## 2020-03-03 RX ORDER — CELECOXIB 200 MG/1
500 CAPSULE ORAL
Qty: 0 | Refills: 0 | DISCHARGE

## 2020-03-03 RX ORDER — PANTOPRAZOLE SODIUM 20 MG/1
1 TABLET, DELAYED RELEASE ORAL
Qty: 0 | Refills: 0 | DISCHARGE
Start: 2020-03-03

## 2020-03-03 RX ORDER — GABAPENTIN 400 MG/1
1 CAPSULE ORAL
Qty: 0 | Refills: 0 | DISCHARGE
Start: 2020-03-03

## 2020-03-03 RX ORDER — ASPIRIN/CALCIUM CARB/MAGNESIUM 324 MG
1 TABLET ORAL
Qty: 0 | Refills: 0 | DISCHARGE
Start: 2020-03-03

## 2020-03-03 RX ADMIN — Medication 15 MILLIGRAM(S): at 05:51

## 2020-03-03 RX ADMIN — Medication 15 MILLIGRAM(S): at 00:52

## 2020-03-03 RX ADMIN — Medication 81 MILLIGRAM(S): at 05:50

## 2020-03-03 RX ADMIN — SODIUM CHLORIDE 3 MILLILITER(S): 9 INJECTION INTRAMUSCULAR; INTRAVENOUS; SUBCUTANEOUS at 05:49

## 2020-03-03 RX ADMIN — Medication 100 MICROGRAM(S): at 05:50

## 2020-03-03 RX ADMIN — Medication 100 MILLIGRAM(S): at 02:13

## 2020-03-03 RX ADMIN — SODIUM CHLORIDE 500 MILLILITER(S): 9 INJECTION INTRAMUSCULAR; INTRAVENOUS; SUBCUTANEOUS at 05:54

## 2020-03-03 RX ADMIN — Medication 975 MILLIGRAM(S): at 05:50

## 2020-03-03 RX ADMIN — PANTOPRAZOLE SODIUM 40 MILLIGRAM(S): 20 TABLET, DELAYED RELEASE ORAL at 05:50

## 2020-03-03 RX ADMIN — POLYETHYLENE GLYCOL 3350 17 GRAM(S): 17 POWDER, FOR SOLUTION ORAL at 12:11

## 2020-03-03 RX ADMIN — Medication 15 MILLIGRAM(S): at 12:11

## 2020-03-03 RX ADMIN — TRAMADOL HYDROCHLORIDE 50 MILLIGRAM(S): 50 TABLET ORAL at 05:50

## 2020-03-03 RX ADMIN — CARBIDOPA AND LEVODOPA 1 TABLET(S): 25; 100 TABLET ORAL at 10:16

## 2020-03-03 RX ADMIN — CARBIDOPA AND LEVODOPA 1 TABLET(S): 25; 100 TABLET ORAL at 02:13

## 2020-03-03 RX ADMIN — GABAPENTIN 100 MILLIGRAM(S): 400 CAPSULE ORAL at 05:50

## 2020-03-03 NOTE — CONSULT NOTE ADULT - PROBLEM SELECTOR RECOMMENDATION 5
- continue atorvastatin    DISPO: No contraindications to discharging pt to rehab as per primary ortho team from a medical perspective.

## 2020-03-03 NOTE — DISCHARGE NOTE PROVIDER - NSDCCPCAREPLAN_GEN_ALL_CORE_FT
PRINCIPAL DISCHARGE DIAGNOSIS  Diagnosis: AVN (avascular necrosis of bone)  Assessment and Plan of Treatment: Patient is a 80 y/o female s/p right total hip arthroplasty on 3/2/20 with Dr Yu. Patient tolerated the procedure well without any intraoperative complications. Patient states pain is controlled. Tolerated Physical Therapy well. As per Dr Yu patient is stable and ready for discharge.   Please follow up with Dr Yu in 2 weeks. Appointment is made for you. Please follow up with your PMD as soon as possible for continuity of care and management. Please keep dressing clean, dry, intact. Any sutures/staples to be removed on post op day # 14.  Please take aspirin twice daily for DVT Prophylaxis. Weight bearing as tolerated. Call orthopaedics with any questions.   The patient has no post operative complications and clinically progressed faster than anticipated. The following medical conditions hypertension, hyperlipidemia, hypothyroidism were actively treated during the hospital stay. The patient met criteria for discharge before the 2nd night of stay. The patient is safe and appropriate for discharge.

## 2020-03-03 NOTE — CONSULT NOTE ADULT - PROBLEM SELECTOR RECOMMENDATION 9
Now s/p R hip TKA. Noted leukocytosis likely reactive  - management and pain control as per ortho team  - ASA 81mg BID for DVT ppx as per ortho protocol  - agree with standing tramadol 50mg q8h and standing Tylenol 975mg q8h, oxycodone PRN for moderate to severe pain, and PRN hydromorphone IV for breakthrough pain  - daily bowel regimen while on opioids.

## 2020-03-03 NOTE — DISCHARGE NOTE NURSING/CASE MANAGEMENT/SOCIAL WORK - NSDCPNINST_GEN_ALL_CORE
You have a postop appointment with Dr Yu on 3/19/2020 @ 11:45  Lyn Lee, please keep postop dressing in place until this appointment.  Notify Dr Yu if you experience any increase in pain not relieved with pain medication, any redness, drainage or swelling around incision, or if you develop a fever >100.5.  Drink plenty of fluids.  No heavy lifting or straining.  Continue to do exercises as instructed, maintain anterior precautions.  Use over the counter stool softeners to assist with constipation which can be aside effect of your pain medication.

## 2020-03-03 NOTE — CONSULT NOTE ADULT - ASSESSMENT
Ms. Edward is an 78 yo woman with PMHx of HTN, HLD, hypothyroidism, Parkinson's, avascular necrosis, OA (s/p left THR 5/2019), s/p lumbar laminectomy with fusion on 3/1/19 and  L3-4 right hemilaminotomy and discectomy on 1/18/2020 now admitted for elective right total hip replacement via direct anterior approach, now POD#1 and recovering well.

## 2020-03-03 NOTE — CONSULT NOTE ADULT - SUBJECTIVE AND OBJECTIVE BOX
CHIEF COMPLAINT: Patient is a 79y old  Female who presents with a chief complaint of FLORENTINO (03 Mar 2020 09:59)      HPI: 78 y/o female with PMHx of HTN, HLD, hypothyroidism, Parkinson's, avascular necrosis, OA (s/p left THR 5/2019), s/p lumbar laminectomy with fusion on 3/1/19,  s/p  L3-4 right hemilaminotomy and discectomy on 1/18/2020 admitted for elective right total hip replacement via direct anterior approach performed on 03/02/2020. Patient recovering well and pain controlled. She denies fever, chills, chest pain, SOB, n/v or abdominal pain. Patient tolerated PT session earlier today.         Allergies  No Known Allergies    HOME MEDICATIONS: [x] Reviewed    MEDICATIONS  (STANDING):  acetaminophen   Tablet .. 975 milliGRAM(s) Oral every 8 hours  aspirin enteric coated 81 milliGRAM(s) Oral two times a day  atorvastatin 40 milliGRAM(s) Oral at bedtime  carbidopa/levodopa  25/100 1 Tablet(s) Oral three times a day  gabapentin 100 milliGRAM(s) Oral every 8 hours  lactated ringers. 1000 milliLiter(s) (125 mL/Hr) IV Continuous <Continuous>  levothyroxine 100 MICROGram(s) Oral daily  pantoprazole    Tablet 40 milliGRAM(s) Oral before breakfast  polyethylene glycol 3350 17 Gram(s) Oral daily  senna 2 Tablet(s) Oral at bedtime  sodium chloride 0.9% lock flush 3 milliLiter(s) IV Push every 8 hours  traMADol 50 milliGRAM(s) Oral every 8 hours    MEDICATIONS  (PRN):  aluminum hydroxide/magnesium hydroxide/simethicone Suspension 30 milliLiter(s) Oral four times a day PRN Indigestion  HYDROmorphone  Injectable 0.5 milliGRAM(s) IV Push every 4 hours PRN for breakthrough pain  ondansetron Injectable 4 milliGRAM(s) IV Push every 6 hours PRN Nausea and/or Vomiting  oxyCODONE    IR 2.5 milliGRAM(s) Oral every 4 hours PRN Mild Pain (1 - 3)  oxyCODONE    IR 5 milliGRAM(s) Oral every 4 hours PRN Moderate Pain (4 - 6)  oxyCODONE    IR 10 milliGRAM(s) Oral every 4 hours PRN Severe Pain (7 - 10)      PAST MEDICAL & SURGICAL HISTORY:  OA (osteoarthritis)  H/O Graves' disease: s/p thyroidectomy  MVP (mitral valve prolapse): pt reports asymptomatic, last Echo &gt;10 years ago  Edema of lower extremity: seen by PMD doppler done -as per patient normal  MARK positive  Spondylolisthesis of lumbar region: s/p lumbar laminectomy with fusion in 3/2019  Hypothyroidism  Osteoporosis  HLD (hyperlipidemia)  Essential hypertension: currently not on medication  Parkinson's disease: dx 2012  Status post left hip replacement: 5/15/2019  S/P lumbar laminectomy: with fusion in 3/1/2019  H/O colonoscopy: 2018  History of tonsillectomy: childhood  History of shoulder surgery: Right- long time ago  History of thyroidectomy, total: 1992  [x ] Reviewed     SOCIAL HISTORY:  [x] Substance abuse:  None  [x] Alcohol use: none  [x] Tobacco: None    FAMILY HISTORY:  FH: heart disease: sister  FH: lung cancer: sister  Family history of cerebral hemorrhage: mother  FH: kidney cancer: father  [x] No pertinent family history in first degree relatives     REVIEW OF SYSTEMS:  [x] All other ROS negative  [  ] Unable to obtain due to poor mental status    Vital Signs Last 24 Hrs  T(C): 36.7 (03 Mar 2020 11:48), Max: 37.4 (02 Mar 2020 12:40)  T(F): 98 (03 Mar 2020 11:48), Max: 99.3 (02 Mar 2020 12:40)  HR: 80 (03 Mar 2020 11:48) (66 - 80)  BP: 140/52 (03 Mar 2020 11:48) (121/51 - 146/47)  BP(mean): 86 (02 Mar 2020 14:45) (66 - 95)  RR: 16 (03 Mar 2020 11:48) (10 - 17)  SpO2: 98% (03 Mar 2020 11:48) (95% - 100%)    PHYSICAL EXAM:  GENERAL: NAD, well-groomed, well-developed  EYES: EOMI, PERRLA, conjunctiva and sclera clear  ENMT: Moist mucous membranes  RESPIRATORY: Clear to auscultation bilaterally; No rales, rhonchi, wheezing, or rubs  CARDIOVASCULAR: Regular rate and rhythm; No murmurs, rubs, or gallops  GASTROINTESTINAL: Soft, Nontender, Nondistended; Bowel sounds present  EXTREMITIES:  2+ Peripheral Pulses, No clubbing, cyanosis, or edema. R hip dressing c/d/i. No surrounding erythema or ecchymosis.  NERVOUS SYSTEM:  Alert & Oriented X3; Moving all 4 extremities; No gross sensory deficits  HEME/LYMPH: No lymphadenopathy noted      LABS:                        9.5    13.59 )-----------( 261      ( 03 Mar 2020 06:17 )             29.2     Hemoglobin: 9.5 g/dL (03-03 @ 06:17)  Hemoglobin: 10.7 g/dL (03-02 @ 12:45)    03-03    139  |  103  |  16  ----------------------------<  107<H>  3.9   |  23  |  0.60    Ca    8.7      03 Mar 2020 06:17        EKG:   Personally Reviewed:  [x] YES     Imaging:   Personally Reviewed:  [x] YES               [ ] Consultant(s) Notes Reviewed  [x] Care Discussed with Consultants/Other Providers: Ortho PA - discussed

## 2020-03-03 NOTE — CONSULT NOTE ADULT - PROBLEM SELECTOR RECOMMENDATION 2
Asymptomatic. Anticipated drop in Hb/hct due to surgery. No signs of active bleeding on exam. Hemodynamically stable  - monitor surgical site.  - monitor blood counts as clinically indicated.

## 2020-03-03 NOTE — DISCHARGE NOTE PROVIDER - NSDCFUSCHEDAPPT_GEN_ALL_CORE_FT
RENETTA STALEY ; 03/05/2020 ; [facility]  RENETTA STALEY ; 03/10/2020 ; [facility]  RENETTA STALEY ; 03/19/2020 ; [address]

## 2020-03-03 NOTE — DISCHARGE NOTE NURSING/CASE MANAGEMENT/SOCIAL WORK - NSDPDISTO_GEN_ALL_CORE
Acute rehab Pt. is afebrile and offers no complaints. In no acute distress. Right hip dressing: clean, dry and intact. Pt is ambulating with a walker, tolerating diet well, and voiding in adequate amounts./Sub-Acute rehab

## 2020-03-03 NOTE — DISCHARGE NOTE PROVIDER - HOSPITAL COURSE
Patient is a 78 y/o female s/p right total hip arthroplasty on 3/2/20 with Dr Yu. Patient tolerated the procedure well without any intraoperative complications. Patient states pain is controlled. Tolerated Physical Therapy well. As per Dr Yu patient is stable and ready for discharge.     Please follow up with Dr Yu in 2 weeks. Appointment is made for you. Please follow up with your PMD as soon as possible for continuity of care and management. Please keep dressing clean, dry, intact. Any sutures/staples to be removed on post op day # 14.  Please take aspirin twice daily for DVT Prophylaxis. Weight bearing as tolerated. Call orthopaedics with any questions.     The patient has no post operative complications and clinically progressed faster than anticipated. The following medical conditions hypertension, hyperlipidemia, hypothyroidism were actively treated during the hospital stay. The patient met criteria for discharge before the 2nd night of stay. The patient is safe and appropriate for discharge.

## 2020-03-03 NOTE — SBIRT NOTE ADULT - NSSBIRTALCPOSREINDET_GEN_A_CORE
Patient reports no current alcohol use. States to SW she used to drink one glass of wine some nights but no longer drinks due to medications. SW discussed risks of alcohol use and medication. Patient was receptive to education.

## 2020-03-03 NOTE — DISCHARGE NOTE PROVIDER - NSDCACTIVITY_GEN_ALL_CORE
Walking - Outdoors allowed/Stairs allowed/No heavy lifting/straining/Do not make important decisions/Do not drive or operate machinery/Walking - Indoors allowed

## 2020-03-03 NOTE — DISCHARGE NOTE NURSING/CASE MANAGEMENT/SOCIAL WORK - PATIENT PORTAL LINK FT
You can access the FollowMyHealth Patient Portal offered by [facility] by registering at the following website: [email or web address]. By joining [facility]’s FollowMyHealth portal, you will also be able to view your health information using other applications (apps) compatible with our system.

## 2020-03-03 NOTE — DISCHARGE NOTE PROVIDER - NSDCMRMEDTOKEN_GEN_ALL_CORE_FT
acetaminophen 325 mg oral tablet: 3 tab(s) orally every 8 hours  aspirin 81 mg oral delayed release tablet: 1 tab(s) orally 2 times a day  atorvastatin 40 mg oral tablet: 1 tab(s) orally once a day  calcium (as calcium citrate) 500 mg oral tablet, effervescent: 1 tab(s) orally once a day  carbidopa-levodopa 25 mg-100 mg oral tablet: 1 tab(s) orally 3 times a day  Centrum Silver oral tablet: 1 tab(s) orally once a day  2/25/2020  gabapentin 100 mg oral capsule: 1 cap(s) orally every 8 hours  lactulose 10 g/15 mL oral syrup: 15 milliliter(s) orally once a day  Levoxyl 100 mcg (0.1 mg) oral tablet: 1 tab(s) orally once a day x 6 days   Levoxyl 100 mcg (0.1 mg) oral tablet: 0.5 tab(s) orally once a day x 1 day  Melatonin 10 mg oral capsule: 1 cap(s) orally once a day (at bedtime)  oxyCODONE 5 mg oral tablet: 1-2 tab(s) orally every 6 hours, As Needed  pantoprazole 40 mg oral delayed release tablet: 1 tab(s) orally once a day (before a meal)  senna oral tablet: 2 tab(s) orally once a day (at bedtime)  traMADol 50 mg oral tablet: 1 tab(s) orally every 8 hours

## 2020-03-03 NOTE — DISCHARGE NOTE PROVIDER - CARE PROVIDER_API CALL
Bran Yu)  Orthopaedic Surgery  [address], [address]  [place] [address]  Phone: [phone]  Fax: [phone]  Follow Up Time:

## 2020-03-03 NOTE — PROGRESS NOTE ADULT - SUBJECTIVE AND OBJECTIVE BOX
Ortho       Patient is seen and examined at bedside.  No significant overnight events. Pain well controlled at moment.      Vital Signs Last 24 Hrs  T(C): 36.5 (03 Mar 2020 05:46), Max: 37.4 (02 Mar 2020 12:40)  T(F): 97.7 (03 Mar 2020 05:46), Max: 99.3 (02 Mar 2020 12:40)  HR: 77 (03 Mar 2020 05:46) (66 - 80)  BP: 139/57 (03 Mar 2020 05:46) (122/55 - 191/77)  BP(mean): 86 (02 Mar 2020 14:45) (66 - 95)  RR: 17 (03 Mar 2020 05:46) (10 - 17)  SpO2: 95% (03 Mar 2020 05:46) (95% - 100%)    Gen: NAD    RLE: Dressing C/D/I.   Motor grossly intact distal + EHL/FHL/ TA/ GS. Sensation is grossly intact to light touch distal. Compartments are soft. Extremity  warm.     Labs:                        10.7   10.68 )-----------( 285      ( 02 Mar 2020 12:45 )             34.0     03-02    140  |  103  |  15  ----------------------------<  116<H>  3.6   |  23  |  0.57    Ca    8.8      02 Mar 2020 12:45        A/P: Patient is a 79y y/o Female s/p right Total Hip Arthroplasty, POD # 1  - Pain control / Analgesia  - Inc Spirometry   - Venodynes  - DVT Prophylaxis- A81 BID  - PT / OT  - WBAT / OOB  - Anterior hip precautions

## 2020-03-03 NOTE — PROGRESS NOTE ADULT - SUBJECTIVE AND OBJECTIVE BOX
S/B Orthopaedic Attending - Bran Yu MD    S/P Right THR DAA - POD #1  Patient with a history of Parkinsons disease and falls    Afebrile  Vital Signs Stable    Right Hip: Wound Dry                        No Swelling                        Nil NVD                        Pain - controlled    PA- Soft  Calves - Soft    Plan:   1. Physical Therapy  2. WBAT Walker  3. Hip Precautions - Anterior  4. Abduction Pillow  5. Static Quads  6. Gluteal Sets  7. SCDS  8. Incentive spirometer  9. Ice Compress q4h for 20 minutes  10. Elevation - 2 pillows under heels.  11. Anticoagulation - Aspirin 81 mg PO q12 x 6 weeks  12. D/C Planning - Rehab when cleared by physical therapy  13. Office Review 2 weeks postop

## 2020-03-03 NOTE — DISCHARGE NOTE NURSING/CASE MANAGEMENT/SOCIAL WORK - NSDCPECAREGIVERED_GEN_ALL_CORE
carenotes on anterior hip, anterior precautions, exercise worksheet, FORCE and side effects pamphlet, managing pain at home handout, carenotes on D?C medications

## 2020-03-05 ENCOUNTER — APPOINTMENT (OUTPATIENT)
Dept: ORTHOPEDIC SURGERY | Facility: CLINIC | Age: 80
End: 2020-03-05

## 2020-03-10 ENCOUNTER — APPOINTMENT (OUTPATIENT)
Dept: ORTHOPEDIC SURGERY | Facility: CLINIC | Age: 80
End: 2020-03-10

## 2020-03-11 NOTE — PATIENT PROFILE ADULT - NSPROMUTANXFEARFT_GEN_A_NUR
postop pain GOAL: pt will be able to perform transfers independently with use of rolling walker within 4 weeks

## 2020-03-13 LAB — SURGICAL PATHOLOGY STUDY: SIGNIFICANT CHANGE UP

## 2020-03-19 ENCOUNTER — APPOINTMENT (OUTPATIENT)
Dept: ORTHOPEDIC SURGERY | Facility: CLINIC | Age: 80
End: 2020-03-19

## 2020-04-06 ENCOUNTER — APPOINTMENT (OUTPATIENT)
Dept: ORTHOPEDIC SURGERY | Facility: CLINIC | Age: 80
End: 2020-04-06

## 2020-05-08 ENCOUNTER — APPOINTMENT (OUTPATIENT)
Dept: ORTHOPEDIC SURGERY | Facility: CLINIC | Age: 80
End: 2020-05-08
Payer: MEDICARE

## 2020-05-08 PROCEDURE — 99214 OFFICE O/P EST MOD 30 MIN: CPT | Mod: 95

## 2020-05-31 ENCOUNTER — FORM ENCOUNTER (OUTPATIENT)
Age: 80
End: 2020-05-31

## 2020-06-09 ENCOUNTER — APPOINTMENT (OUTPATIENT)
Dept: ORTHOPEDIC SURGERY | Facility: CLINIC | Age: 80
End: 2020-06-09
Payer: MEDICARE

## 2020-06-09 VITALS — TEMPERATURE: 97.7 F

## 2020-06-09 PROCEDURE — 72100 X-RAY EXAM L-S SPINE 2/3 VWS: CPT

## 2020-06-09 PROCEDURE — 99214 OFFICE O/P EST MOD 30 MIN: CPT

## 2020-06-09 NOTE — PHYSICAL EXAM
[Normal] : Oriented to person, place, and time, insight and judgement were intact and the affect was normal [de-identified] : Lumbar ROM\par NTTP L spine and b/l paraspinals L region. \par Skin intact L spine. No rashes, ulcers, blisters. \par Well healed posterior incision\par Able to sit to stand\par intact sensation BLE\par  [de-identified] : 2 views AP and Lat of LS Spine from [id number] . Read and dictated by Dr. Tee Patricia Board Certified orthopaedic surgeon 6/9/2020 L34 Spondylolisthesis grade 1 \par \par

## 2020-06-09 NOTE — HISTORY OF PRESENT ILLNESS
[5] : an average pain level of 5/10 [Intermit.] : ~He/She~ states the symptoms seem to be intermittent [Worsening] : worsening [1] : a current pain level of 1/10 [Prolonged Standing] : worsened by prolonged standing [Standing] : worsened by standing [6] : a maximum pain level of 6/10 [de-identified] : Pt is  s/p  L34 laminectomy on 01/18/20.  Here in office today to obtain lumbar XRs. She underwent recent hip replacement on right side.  Recent exacerbation of bilateral hip pain.  She best snot have any back pain.  She has had symptoms for 6 weeks intermittent, and stated that it started after she started doing PT post her hip replacement. She states that it started occuring around April 1st. Pain worsens when she stands from a sitting position. . At times times has LLE radiculopathy that responds to NSAIDS, and occasional oxycodone.  Uses cane to ambulate. Pt had home PT. Pt denies fever, chills, weight changes, loss of bladder control, bowel incontinence or urinary retention or saddle anesthesia. Pain improves with activity. \par \par

## 2020-06-09 NOTE — DISCUSSION/SUMMARY
[de-identified] : 79 yo female bilateral trochanteric bursitis, with minimal neurological findings, there is new onset spondylolisthesis L34, but i do not think this cause of her symptoms, she has focal tenderness over her trochanters, Dr. Yu to evaluate.\par \par \par Diagnosis, prognosis, natural history and treatment was discussed with patient. Patient was advised if the following symptoms develop: chills, fever, \par loss of bladder control, bowel incontinence or urinary retention, numbness/tingling or weakness is present in upper or lower extremities, to go to the nearest emergency room. This may be a new clinical condition not present at the time of the patient visit  that may lead to paralysis and/or death, Patient advised if the above symptoms developed to also call the office immediately to inform us and to go to the nearest emergency room.\par

## 2020-06-18 ENCOUNTER — APPOINTMENT (OUTPATIENT)
Dept: ORTHOPEDIC SURGERY | Facility: CLINIC | Age: 80
End: 2020-06-18
Payer: MEDICARE

## 2020-06-18 VITALS — HEART RATE: 89 BPM | DIASTOLIC BLOOD PRESSURE: 84 MMHG | SYSTOLIC BLOOD PRESSURE: 132 MMHG

## 2020-06-18 VITALS — TEMPERATURE: 97.9 F

## 2020-06-18 DIAGNOSIS — Z96.641 PRESENCE OF RIGHT ARTIFICIAL HIP JOINT: ICD-10-CM

## 2020-06-18 DIAGNOSIS — Z96.642 PRESENCE OF LEFT ARTIFICIAL HIP JOINT: ICD-10-CM

## 2020-06-18 PROCEDURE — 20610 DRAIN/INJ JOINT/BURSA W/O US: CPT | Mod: LT

## 2020-06-18 PROCEDURE — 73521 X-RAY EXAM HIPS BI 2 VIEWS: CPT

## 2020-06-18 PROCEDURE — 99214 OFFICE O/P EST MOD 30 MIN: CPT | Mod: 25

## 2020-06-18 NOTE — REASON FOR VISIT
[Artificial Hip Joint] : artificial hip joint [Follow-Up Visit] : a follow-up visit for [Hip Pain] : hip pain

## 2020-06-23 PROBLEM — Z96.642 STATUS POST LEFT HIP REPLACEMENT: Status: ACTIVE | Noted: 2019-05-29

## 2020-06-23 NOTE — PHYSICAL EXAM
[de-identified] : On general examination the patient is adequately groomed and nourished. The vital parameters are as recorded. \par There is no lymphedema or diffuse swelling, no varicose veins, no skin warmth/erythema/scars/swelling, no ulcers and no palpable lymph nodes or masses in both lower extremities. Bilateral pedal pulses are well palpable.\par Upper Extremity:\par Both right and left upper extremities are unremarkable in terms of skin rash, lesions, pigmentation, redness, tenderness, swelling, joint instability, abnormal deformity or crepitus. The overall range of motion, sensation, motor tone and strength testing are normal.\par \par Spine Exam:\par There is moderate curvature of the spine with loss of normal lumbar lordosis. The skin is devoid of erythema, pigmentation or rashes. There is mild lumbar spasm and tenderness especially at L4-L5 or L5-S1. \par The range of motion of the lumbar spine is limited by pain and spasm. Forward flexion is 50% normal, extension catch positive, lateral flexion and rotation 60% normal. There is no lumbar spine imbalance or instability detected.\par Bilateral passive SLR is right 40 degrees, left 40 degrees in supine and sitting positions. Lasegue's Test is negative.\par Neurology:\par The patient is alert and oriented in person, place and time. The mood is calm and affect is normal.\par Testing for coordination including Rhomberg's Test and Finger-Nose Test, sensation, motor tone and power and deep tendon reflexes in both lower extremities is normal.\par \par bilateral  hip: Walks with stiff hip gait. There are well healed scars of surgery with no significant swelling, redness, or tenderness. There is tenderness to palpation of the left greater trochanteric bursal region. Range of motion of the hip: active SLR of 30 degrees and hip flexion to 60 degrees Abduction 30 degrees adduction 15 degrees external rotation 30 degrees internal rotation 15 degrees with hip abductor extensor power grade +4.\par  [de-identified] : AP and false profile Radiographs of the left hip and pelvis taken today reveal a well fixed and aligned left total hip replacement with no signs of mechanical failure or periprosthetic fracture.\par AP and false profile views of the right hip and AP view of the pelvis taken today reveal a well fixed and aligned right total hip replacement with no signs of mechanical failure or periprosthetic fracture.\par AP and lateral views of the lumbar spine taken last week with Dr. Patricia, reveal extensive DJD. There is loss of normal lordosis. There is spondylolisthesis at L3-4 and L4-5. \par

## 2020-06-23 NOTE — PROCEDURE
[Injection] : Injection [Left] : of the left [Greater Trochanter] : greater trochanteric bursa [Inflammation] : Inflammation [Diagnostic] : Diagnostic [Patient] : patient [Risk] : risk [Benefits] : benefits [Alternatives] : alternatives [Bleeding] : bleeding [Infection] : infection [Allergic Reaction] : allergic reaction [Verbal Consent Obtained] : verbal consent was obtained prior to the procedure [Ethyl Chloride Spray] : ethyl chloride spray was used as a topical anesthetic [Betadine] : Betadine [Direct] : direct [22] : a 22-gauge [1% Lidocaine___(mL)] : [unfilled] mL of 1% Lidocaine [Methylpred. 40mg/mL___(mL)] : [unfilled] mL 40mg/mL methylprednisolone [Bandage Applied] : a bandage [Tolerated Well] : The patient tolerated the procedure well [None] : none

## 2020-06-23 NOTE — DISCUSSION/SUMMARY
[de-identified] : s/p right total hip replacement, s/p left total hip replacement, lumbar spinal stenosis/spondylolisthesis L3-4, with history of parkinsonism, with new onset GT bursitis left more than right hip. \par \par At this time we have discussed that her pain is multimodal. Her hip replacements are stable, and this pain is not stemming from the hip joints. She has mild bursitis of the left hip, none on the right side, with lower back pain and spasm stemming from extensive arthritis of the lower back. She has limitations of flexion of the lower back, and spasm, with radiation to the buttocks. \par At this time I have recommended a steroid injection to the left greater trochanteric bursa, as both a therapeutic and diagnostic treatment option, to try to determine the source of her pain. she agreed to proceed with the injection. She received the injection under sterile conditions and tolerated the procedure well. \par She has been recommended for physical therapy for the lower back and bilateral hips. \par I have recommended changing her prescription for Celebrex, instead of ibuprofen, as this has not been providing her relief.  \par She will follow up with Dr. Patricia for her lower back. \par She will follow up in six weeks with me if symptoms persist in her hip, otherwise annually s/p hip replacements. \par \par

## 2020-06-23 NOTE — CONSULT LETTER
[Dear  ___] : Dear  [unfilled], [Consult Letter:] : I had the pleasure of evaluating your patient, [unfilled]. [Please see my note below.] : Please see my note below. [Consult Closing:] : Thank you very much for allowing me to participate in the care of this patient.  If you have any questions, please do not hesitate to contact me. [Sincerely,] : Sincerely, [FreeTextEntry2] : LORI HOPPER [FreeTextEntry3] : Bran Yu MD\par \par ______________________________________________\par [facility]: Hip/Knee Arthroplasty\par [address], [address], [place] [address]\par (t) [phone]\par (f) [phone]\par

## 2020-06-23 NOTE — HISTORY OF PRESENT ILLNESS
[de-identified] : Ms. RENETTA STALEY is a 80 year old female presents s/p right total hip replacement, s/p left total hip replacement, with lumbar spinal stenosis. She presents with a history of left hip pain, which began in April of this year, while doing physical therapy for the right hip following her surgery. She denies any fall or specific injury. She notes her pain is localized to the lateral and posterior aspect of the left hip, with pain radiating to the front of the hip. \par She has chronic lower back pain, with radiation and spasm to bilateral LE. She ambulates with a walker. She presents to review and make sure her hip is stable.

## 2020-07-02 NOTE — PATIENT PROFILE ADULT - SAFE PLACE TO LIVE
no

## 2020-08-08 ENCOUNTER — RESULT REVIEW (OUTPATIENT)
Age: 80
End: 2020-08-08

## 2020-08-08 ENCOUNTER — APPOINTMENT (OUTPATIENT)
Dept: MRI IMAGING | Facility: CLINIC | Age: 80
End: 2020-08-08
Payer: MEDICARE

## 2020-08-08 ENCOUNTER — OUTPATIENT (OUTPATIENT)
Dept: OUTPATIENT SERVICES | Facility: HOSPITAL | Age: 80
LOS: 1 days | End: 2020-08-08
Payer: MEDICARE

## 2020-08-08 DIAGNOSIS — E89.0 POSTPROCEDURAL HYPOTHYROIDISM: Chronic | ICD-10-CM

## 2020-08-08 DIAGNOSIS — Z98.890 OTHER SPECIFIED POSTPROCEDURAL STATES: Chronic | ICD-10-CM

## 2020-08-08 DIAGNOSIS — Z90.89 ACQUIRED ABSENCE OF OTHER ORGANS: Chronic | ICD-10-CM

## 2020-08-08 DIAGNOSIS — M48.07 SPINAL STENOSIS, LUMBOSACRAL REGION: ICD-10-CM

## 2020-08-08 DIAGNOSIS — Z96.642 PRESENCE OF LEFT ARTIFICIAL HIP JOINT: Chronic | ICD-10-CM

## 2020-08-08 PROCEDURE — 72158 MRI LUMBAR SPINE W/O & W/DYE: CPT

## 2020-08-08 PROCEDURE — 72158 MRI LUMBAR SPINE W/O & W/DYE: CPT | Mod: 26

## 2020-08-08 PROCEDURE — A9585: CPT

## 2020-08-14 ENCOUNTER — APPOINTMENT (OUTPATIENT)
Dept: ORTHOPEDIC SURGERY | Facility: CLINIC | Age: 80
End: 2020-08-14
Payer: MEDICARE

## 2020-08-14 PROCEDURE — 99214 OFFICE O/P EST MOD 30 MIN: CPT | Mod: 95

## 2020-09-03 ENCOUNTER — APPOINTMENT (OUTPATIENT)
Dept: INTERNAL MEDICINE | Facility: CLINIC | Age: 80
End: 2020-09-03
Payer: MEDICARE

## 2020-09-03 VITALS
TEMPERATURE: 97.16 F | BODY MASS INDEX: 18.47 KG/M2 | HEART RATE: 89 BPM | SYSTOLIC BLOOD PRESSURE: 134 MMHG | WEIGHT: 129 LBS | OXYGEN SATURATION: 98 % | DIASTOLIC BLOOD PRESSURE: 74 MMHG | HEIGHT: 70 IN

## 2020-09-03 VITALS — HEART RATE: 72 BPM | SYSTOLIC BLOOD PRESSURE: 130 MMHG | DIASTOLIC BLOOD PRESSURE: 80 MMHG

## 2020-09-03 VITALS — DIASTOLIC BLOOD PRESSURE: 84 MMHG | SYSTOLIC BLOOD PRESSURE: 138 MMHG | HEART RATE: 72 BPM

## 2020-09-03 VITALS — SYSTOLIC BLOOD PRESSURE: 136 MMHG | HEART RATE: 68 BPM | DIASTOLIC BLOOD PRESSURE: 80 MMHG

## 2020-09-03 DIAGNOSIS — Z23 ENCOUNTER FOR IMMUNIZATION: ICD-10-CM

## 2020-09-03 DIAGNOSIS — R91.1 SOLITARY PULMONARY NODULE: ICD-10-CM

## 2020-09-03 DIAGNOSIS — Z12.83 ENCOUNTER FOR SCREENING FOR MALIGNANT NEOPLASM OF SKIN: ICD-10-CM

## 2020-09-03 PROCEDURE — 99214 OFFICE O/P EST MOD 30 MIN: CPT | Mod: 25

## 2020-09-03 PROCEDURE — 93000 ELECTROCARDIOGRAM COMPLETE: CPT | Mod: 59

## 2020-09-03 PROCEDURE — G0008: CPT

## 2020-09-03 PROCEDURE — G0439: CPT

## 2020-09-03 PROCEDURE — 90662 IIV NO PRSV INCREASED AG IM: CPT

## 2020-09-03 PROCEDURE — 36415 COLL VENOUS BLD VENIPUNCTURE: CPT

## 2020-09-03 PROCEDURE — G0444 DEPRESSION SCREEN ANNUAL: CPT | Mod: 59

## 2020-09-03 RX ORDER — CELECOXIB 200 MG/1
200 CAPSULE ORAL TWICE DAILY
Qty: 180 | Refills: 0 | Status: DISCONTINUED | COMMUNITY
Start: 2020-02-06 | End: 2020-09-03

## 2020-09-03 RX ORDER — IBUPROFEN 800 MG/1
800 TABLET, FILM COATED ORAL 3 TIMES DAILY
Qty: 90 | Refills: 0 | Status: DISCONTINUED | COMMUNITY
Start: 2020-04-06 | End: 2020-09-03

## 2020-09-03 RX ORDER — CELECOXIB 200 MG/1
200 CAPSULE ORAL TWICE DAILY
Qty: 180 | Refills: 0 | Status: DISCONTINUED | COMMUNITY
Start: 2020-06-18 | End: 2020-09-03

## 2020-09-03 RX ORDER — TRAMADOL HYDROCHLORIDE 50 MG/1
50 TABLET, COATED ORAL
Qty: 60 | Refills: 0 | Status: DISCONTINUED | COMMUNITY
Start: 2020-04-06 | End: 2020-09-03

## 2020-09-05 DIAGNOSIS — R80.9 PROTEINURIA, UNSPECIFIED: ICD-10-CM

## 2020-09-05 LAB
25(OH)D3 SERPL-MCNC: 45.1 NG/ML
ALBUMIN SERPL ELPH-MCNC: 4.7 G/DL
ALP BLD-CCNC: 68 U/L
ALT SERPL-CCNC: 8 U/L
ANION GAP SERPL CALC-SCNC: 17 MMOL/L
APPEARANCE: ABNORMAL
AST SERPL-CCNC: 30 U/L
BACTERIA: NEGATIVE
BASOPHILS # BLD AUTO: 0.08 K/UL
BASOPHILS NFR BLD AUTO: 0.8 %
BILIRUB SERPL-MCNC: 0.4 MG/DL
BILIRUBIN URINE: NEGATIVE
BLOOD URINE: NEGATIVE
BUN SERPL-MCNC: 28 MG/DL
CALCIUM OXALATE CRYSTALS: ABNORMAL
CALCIUM SERPL-MCNC: 10.7 MG/DL
CALCIUM SERPL-MCNC: 10.7 MG/DL
CHLORIDE SERPL-SCNC: 100 MMOL/L
CHOLEST SERPL-MCNC: 198 MG/DL
CHOLEST/HDLC SERPL: 2.4 RATIO
CO2 SERPL-SCNC: 25 MMOL/L
COLOR: YELLOW
CREAT SERPL-MCNC: 0.81 MG/DL
EOSINOPHIL # BLD AUTO: 0.09 K/UL
EOSINOPHIL NFR BLD AUTO: 0.9 %
GLUCOSE QUALITATIVE U: NEGATIVE
GLUCOSE SERPL-MCNC: 92 MG/DL
HCT VFR BLD CALC: 44.2 %
HDLC SERPL-MCNC: 82 MG/DL
HGB BLD-MCNC: 13.3 G/DL
HYALINE CASTS: 0 /LPF
IMM GRANULOCYTES NFR BLD AUTO: 0.3 %
KETONES URINE: NORMAL
LDLC SERPL CALC-MCNC: 99 MG/DL
LEUKOCYTE ESTERASE URINE: NEGATIVE
LYMPHOCYTES # BLD AUTO: 1.41 K/UL
LYMPHOCYTES NFR BLD AUTO: 13.9 %
MAGNESIUM SERPL-MCNC: 1.9 MG/DL
MAN DIFF?: NORMAL
MCHC RBC-ENTMCNC: 28.1 PG
MCHC RBC-ENTMCNC: 30.1 GM/DL
MCV RBC AUTO: 93.4 FL
MICROSCOPIC-UA: NORMAL
MONOCYTES # BLD AUTO: 0.59 K/UL
MONOCYTES NFR BLD AUTO: 5.8 %
NEUTROPHILS # BLD AUTO: 7.93 K/UL
NEUTROPHILS NFR BLD AUTO: 78.3 %
NITRITE URINE: NEGATIVE
PARATHYROID HORMONE INTACT: 29 PG/ML
PH URINE: 7
PLATELET # BLD AUTO: 287 K/UL
POTASSIUM SERPL-SCNC: 4.3 MMOL/L
PROT SERPL-MCNC: 7.2 G/DL
PROTEIN URINE: ABNORMAL
RBC # BLD: 4.73 M/UL
RBC # FLD: 18 %
RED BLOOD CELLS URINE: 7 /HPF
SODIUM SERPL-SCNC: 142 MMOL/L
SPECIFIC GRAVITY URINE: 1.03
SQUAMOUS EPITHELIAL CELLS: 2 /HPF
T4 FREE SERPL-MCNC: 1.6 NG/DL
TRIGL SERPL-MCNC: 87 MG/DL
TRIPLE PHOSPHATE CRYSTALS: ABNORMAL
TSH SERPL-ACNC: 1.17 UIU/ML
UROBILINOGEN URINE: NORMAL
VIT B12 SERPL-MCNC: 782 PG/ML
WBC # FLD AUTO: 10.13 K/UL
WHITE BLOOD CELLS URINE: 3 /HPF

## 2020-09-06 ENCOUNTER — NON-APPOINTMENT (OUTPATIENT)
Age: 80
End: 2020-09-06

## 2020-09-06 RX ORDER — RASAGILINE 1 MG/1
1 TABLET ORAL DAILY
Refills: 0 | Status: ACTIVE | COMMUNITY

## 2020-09-06 RX ORDER — MUPIROCIN 20 MG/G
2 OINTMENT TOPICAL
Qty: 1 | Refills: 0 | Status: DISCONTINUED | COMMUNITY
Start: 2020-02-20 | End: 2020-09-06

## 2020-09-06 RX ORDER — OMEPRAZOLE 20 MG/1
20 CAPSULE, DELAYED RELEASE ORAL
Refills: 0 | Status: DISCONTINUED | COMMUNITY
End: 2020-09-06

## 2020-09-06 NOTE — PHYSICAL EXAM
[No Acute Distress] : no acute distress [Well Developed] : well developed [Well Nourished] : well nourished [Normal Voice/Communication] : normal voice/communication [Well-Appearing] : well-appearing [Normal Sclera/Conjunctiva] : normal sclera/conjunctiva [PERRL] : pupils equal round and reactive to light [EOMI] : extraocular movements intact [Normal Outer Ear/Nose] : the outer ears and nose were normal in appearance [Normal Oropharynx] : the oropharynx was normal [No JVD] : no jugular venous distention [Normal TMs] : both tympanic membranes were normal [Supple] : supple [Thyroid Normal, No Nodules] : the thyroid was normal and there were no nodules present [No Lymphadenopathy] : no lymphadenopathy [No Respiratory Distress] : no respiratory distress  [Clear to Auscultation] : lungs were clear to auscultation bilaterally [Normal Rate] : normal rate  [No Accessory Muscle Use] : no accessory muscle use [Normal Percussion] : the chest was normal to percussion [Regular Rhythm] : with a regular rhythm [No Murmur] : no murmur heard [Normal S1, S2] : normal S1 and S2 [No Abdominal Bruit] : a ~M bruit was not heard ~T in the abdomen [No Carotid Bruits] : no carotid bruits [No Varicosities] : no varicosities [Pedal Pulses Present] : the pedal pulses are present [No Extremity Clubbing/Cyanosis] : no extremity clubbing/cyanosis [No Palpable Aorta] : no palpable aorta [Normal Appearance] : normal in appearance [No Nipple Discharge] : no nipple discharge [Soft] : abdomen soft [No Axillary Lymphadenopathy] : no axillary lymphadenopathy [Non Tender] : non-tender [No Masses] : no abdominal mass palpated [Non-distended] : non-distended [No HSM] : no HSM [Normal Supraclavicular Nodes] : no supraclavicular lymphadenopathy [Normal Bowel Sounds] : normal bowel sounds [Normal Axillary Nodes] : no axillary lymphadenopathy [Normal Posterior Cervical Nodes] : no posterior cervical lymphadenopathy [Normal Anterior Cervical Nodes] : no anterior cervical lymphadenopathy [Normal Inguinal Nodes] : no inguinal lymphadenopathy [No CVA Tenderness] : no CVA  tenderness [No Spinal Tenderness] : no spinal tenderness [No Joint Swelling] : no joint swelling [No Rash] : no rash [Grossly Normal Strength/Tone] : grossly normal strength/tone [No Skin Lesions] : no skin lesions [Speech Grossly Normal] : speech grossly normal [Coordination Grossly Intact] : coordination grossly intact [Deep Tendon Reflexes (DTR)] : deep tendon reflexes were 2+ and symmetric [Memory Grossly Normal] : memory grossly normal [Normal Affect] : the affect was normal [Alert and Oriented x3] : oriented to person, place, and time [Normal Mood] : the mood was normal [Normal Insight/Judgement] : insight and judgment were intact [No Edema] : there was no peripheral edema [Kyphosis] : no kyphosis [Scoliosis] : no scoliosis [de-identified] : masked facies.  No tremor or rigidity.  Motor5/5 bilaterally.  Walking with a cane

## 2020-09-06 NOTE — ASSESSMENT
[FreeTextEntry1] : Her blood pressure is controlled and she is not orthostatic. Her Parkinson's appears stable. She will continue to followup with spine ortho regarding her lumbar spinal issues. With her chronic NSAID usage we'll check a CBC comprehensive metabolic panel and she will continue on omeprazole daily. She was given a referral for her yearly mammogram. She was given a referral to ophthalmology for history of cataracts she will see dermatology for  skin cancer screening. Blood work was sent for evaluation of her lipids. With her history of Graves' disease thyroid function test was sent. She was advised to go for a followup chest CT for surveillance of the lung nodule 2 years ago and if the CAT scan is negative no further workup is needed. She was given a high-dose influenza vaccine today. Advanced  directives were reviewed and the patient has them in place.\par She will follow up with endocrinology regarding osteopenia.\par She was given a referral for screening mammogram and breast sonogram.\par She requests a referral to nutritionist for prediabetes

## 2020-09-06 NOTE — HISTORY OF PRESENT ILLNESS
[FreeTextEntry1] : Annual wellness visit\par Hyperlipidemia\par Hypothyroidism\par Parkinsons\par Low back pain [de-identified] : She has overall been doing well with the pandemic and is  socially distancing. She is going to physical therapy for recurring low back pain. She is deferring any surgery at the present time. She is on ibuprofen twice a day as well as tramadol twice a day. She has pain in her low back that can radiate down the left leg.   She has no numbness or weakness. She is walking with a cane.   There have been no falls apart from once falling out of bed. She sees neurology every 6 months and her Parkinson's is stable. She is driving and independent with all activities of daily living apart from having a cleaning lady. She needs cataract surgery and wants a referral to ophthalmology. She wants a referral to dermatology for skin cancer screening.   She skipped her mammogram last year and is due now and wants a referral here on [place]. She is planning to see endocrinology for  history of osteopenia. He's been on a drug holiday from alendronate the past year

## 2020-09-06 NOTE — HEALTH RISK ASSESSMENT
[0] : 2) Feeling down, depressed, or hopeless: Not at all (0) [Patient reported PAP Smear was normal] : Patient reported PAP Smear was normal [Alone] : lives alone [None] : None [College] : College [Retired] : retired [] :  [Feels Safe at Home] : Feels safe at home [Fully functional (using the telephone, shopping, preparing meals, housekeeping, doing laundry, using] : Fully functional and needs no help or supervision to perform IADLs (using the telephone, shopping, preparing meals, housekeeping, doing laundry, using transportation, managing medications and managing finances) [Fully functional (bathing, dressing, toileting, transferring, walking, feeding)] : Fully functional (bathing, dressing, toileting, transferring, walking, feeding) [Reports changes in vision] : Reports changes in vision [Smoke Detector] : smoke detector [Carbon Monoxide Detector] : carbon monoxide detector [Seat Belt] :  uses seat belt [Sunscreen] : uses sunscreen [Reviewed no changes] : Reviewed no changes [No] : In the past 12 months have you used drugs other than those required for medical reasons? No [Designated Healthcare Proxy] : Designated healthcare proxy [Name: ___] : Health Care Proxy's Name: [unfilled]  [Relationship: ___] : Relationship: [unfilled] [One fall no injury in past year] : Patient reported one fall in the past year without injury [] : No [de-identified] : tries to eat healthy [de-identified] : fell out of bed [de-identified] : none [Language] : denies difficulty with language [Behavior] : denies difficulty with behavior [Change in mental status noted] : No change in mental status noted [Learning/Retaining New Information] : denies difficulty learning/retaining new information [Handling Complex Tasks] : denies difficulty handling complex tasks [Reasoning] : denies difficulty with reasoning [Spatial Ability and Orientation] : denies difficulty with spatial ability and orientation [Reports changes in hearing] : Reports no changes in hearing [High Risk Behavior] : no high risk behavior [Sexually Active] : not sexually active [Reports changes in dental health] : Reports no changes in dental health [Reports normal functional visual acuity (ie: able to read med bottle)] : Reports poor functional visual acuity.  [Guns at Home] : no guns at home [Travel to Developing Areas] : does not  travel to developing areas [PapSmearDate] : 2017 [MammogramDate] : 11/2018 [MammogramComments] : birads 2 [BoneDensityDate] : 6/2017 [BoneDensityComments] : stable osteopenia [ColonoscopyDate] : 6/2015 [ColonoscopyComments] : no polyps [AdvancecareDate] : 9/2020 [de-identified] : has cataracts told needs extractions

## 2020-09-10 ENCOUNTER — RESULT REVIEW (OUTPATIENT)
Age: 80
End: 2020-09-10

## 2020-09-10 ENCOUNTER — OUTPATIENT (OUTPATIENT)
Dept: OUTPATIENT SERVICES | Facility: HOSPITAL | Age: 80
LOS: 1 days | End: 2020-09-10
Payer: MEDICARE

## 2020-09-10 ENCOUNTER — APPOINTMENT (OUTPATIENT)
Dept: CT IMAGING | Facility: CLINIC | Age: 80
End: 2020-09-10
Payer: MEDICARE

## 2020-09-10 DIAGNOSIS — Z12.39 ENCOUNTER FOR OTHER SCREENING FOR MALIGNANT NEOPLASM OF BREAST: ICD-10-CM

## 2020-09-10 DIAGNOSIS — Z96.642 PRESENCE OF LEFT ARTIFICIAL HIP JOINT: Chronic | ICD-10-CM

## 2020-09-10 DIAGNOSIS — Z90.89 ACQUIRED ABSENCE OF OTHER ORGANS: Chronic | ICD-10-CM

## 2020-09-10 DIAGNOSIS — Z98.890 OTHER SPECIFIED POSTPROCEDURAL STATES: Chronic | ICD-10-CM

## 2020-09-10 DIAGNOSIS — E89.0 POSTPROCEDURAL HYPOTHYROIDISM: Chronic | ICD-10-CM

## 2020-09-10 PROCEDURE — 71250 CT THORAX DX C-: CPT | Mod: 26

## 2020-09-10 PROCEDURE — 71250 CT THORAX DX C-: CPT

## 2020-09-22 ENCOUNTER — APPOINTMENT (OUTPATIENT)
Dept: ORTHOPEDIC SURGERY | Facility: CLINIC | Age: 80
End: 2020-09-22
Payer: MEDICARE

## 2020-09-22 VITALS — TEMPERATURE: 97.1 F

## 2020-09-22 PROCEDURE — 99214 OFFICE O/P EST MOD 30 MIN: CPT

## 2020-09-22 PROCEDURE — 72100 X-RAY EXAM L-S SPINE 2/3 VWS: CPT

## 2020-10-08 NOTE — PHYSICAL THERAPY INITIAL EVALUATION ADULT - SITTING BALANCE: STATIC
No heavy lifting/straining/Walking - Indoors allowed/Do not drive or operate machinery/Walking - Outdoors allowed/Do not make important decisions
good balance

## 2020-10-09 ENCOUNTER — APPOINTMENT (OUTPATIENT)
Dept: OPHTHALMOLOGY | Facility: CLINIC | Age: 80
End: 2020-10-09
Payer: MEDICARE

## 2020-10-09 ENCOUNTER — NON-APPOINTMENT (OUTPATIENT)
Age: 80
End: 2020-10-09

## 2020-10-09 PROCEDURE — 92004 COMPRE OPH EXAM NEW PT 1/>: CPT

## 2020-10-09 PROCEDURE — 92134 CPTRZ OPH DX IMG PST SGM RTA: CPT

## 2020-10-21 ENCOUNTER — APPOINTMENT (OUTPATIENT)
Dept: OPHTHALMOLOGY | Facility: CLINIC | Age: 80
End: 2020-10-21
Payer: MEDICARE

## 2020-10-21 ENCOUNTER — NON-APPOINTMENT (OUTPATIENT)
Age: 80
End: 2020-10-21

## 2020-10-21 PROCEDURE — 92136 OPHTHALMIC BIOMETRY: CPT

## 2020-10-21 PROCEDURE — 92012 INTRM OPH EXAM EST PATIENT: CPT

## 2020-10-30 ENCOUNTER — APPOINTMENT (OUTPATIENT)
Dept: ENDOCRINOLOGY | Facility: CLINIC | Age: 80
End: 2020-10-30
Payer: MEDICARE

## 2020-10-30 ENCOUNTER — RX RENEWAL (OUTPATIENT)
Age: 80
End: 2020-10-30

## 2020-10-30 VITALS — DIASTOLIC BLOOD PRESSURE: 80 MMHG | SYSTOLIC BLOOD PRESSURE: 120 MMHG | OXYGEN SATURATION: 97 % | HEART RATE: 81 BPM

## 2020-10-30 VITALS — WEIGHT: 124 LBS | HEIGHT: 57.8 IN | BODY MASS INDEX: 26.03 KG/M2 | TEMPERATURE: 98.2 F

## 2020-10-30 PROCEDURE — 99214 OFFICE O/P EST MOD 30 MIN: CPT | Mod: 25

## 2020-10-30 PROCEDURE — ZZZZZ: CPT

## 2020-10-30 PROCEDURE — 77080 DXA BONE DENSITY AXIAL: CPT | Mod: GA

## 2020-10-30 NOTE — REVIEW OF SYSTEMS
[Joint Pain] : joint pain [Negative] : Genitourinary [Fatigue] : no fatigue [Recent Weight Gain (___ Lbs)] : no recent weight gain [Recent Weight Loss (___ Lbs)] : no recent weight loss [Dry Skin] : no dry skin [Hair Loss] : no hair loss [Poor Balance] : poor balance [Polydipsia] : no polydipsia [Swelling] : no swelling

## 2020-10-30 NOTE — ASSESSMENT
[Bisphosphonate Therapy] : Risks  and benefits of bisphosphonate therapy were  discussed with the patient including gastroesophageal irritation, osteonecrosis of the jaw, and atypical femur fractures, and acute phase reaction [FreeTextEntry1] : 80 y.o. female with h/o osteopenia and hypothyroidism s/p surgery for Graves disease.\par 1. Osteopenia- DEXA scan performed today shows stability with spine -1.1 and 1/3 radius -2.0. Patient is at increased risk of fracture. Fall precautions discussed. Encouraged weight bearing activity. Reviewed appropriate calcium and vitamin D intake. Will restart Alendronate 70 mg po weekly given appropriate drug holiday.  Will repeat DEXA scan in 2021.\par 2. Hypothyroidism- Patient is euthyroid. Will continue LT4 100 mcg daily 6 days per week and 1/2 tab once weekly\par 3. Hyperlipidemia- Will continue statin. \par \par Follow up in 1 year with DEXA scan [Bisphosphonates] : The patient was instructed to take bisphosphonates on an empty stomach with a full glass of water,and wait at least 30 minutes before eating or lying down

## 2020-10-30 NOTE — HISTORY OF PRESENT ILLNESS
[FreeTextEntry1] : 80 y.o. female with h/o osteopenia and hypothyroidism s/p surgery for Graves disease and nodules here for follow up visit. H/o left ankle fracture in January 2015. Had posterior lumbar laminectomy L3-L5 and fusion of L4-L5 on 3/1/19. Had L3/4 laminectomy on 1/18/2020. Currently dealing with low back pain related to spondylolisthesis.  Also had left hip replacement on 5/5/19. Had right hip replacement on 3/2/2020 for avascular necrosis. Was doing PT. Doing better now. Does have falls given Parkinson's disease which is stable but none recently. Sees neurology every 6 months. No recent falls or fractures. Uses cane. Was taking Alendronate 70 mg weekly since June 2015 but stopped it in February 2019 prior to surgery. No dental issues and sees dentist. Off calcium supplement and now taking MVI. DEXA scan in May 2015 showed spine -1.4, femoral neck -2.4 and forearm -2.2. DEXA scan in June 2017 shows stability with spine -1.6, left femoral neck -2.2, total hip -1.8 and 1/3 radius -1.6. Also has orthostatic hypotension and being monitored. \par \par In regards to thyroid disease,  taking LT4 100 mcg 6 days per week and 1/2 tab once weekly. No neck complaints.

## 2020-10-30 NOTE — PHYSICAL EXAM
[Alert] : alert [No Acute Distress] : no acute distress [Normal Sclera/Conjunctiva] : normal sclera/conjunctiva [EOMI] : extra ocular movement intact [No LAD] : no lymphadenopathy [Thyroid Not Enlarged] : the thyroid was not enlarged [No Respiratory Distress] : no respiratory distress [Clear to Auscultation] : lungs were clear to auscultation bilaterally [Normal S1, S2] : normal S1 and S2 [Regular Rhythm] : with a regular rhythm [Normal Bowel Sounds] : normal bowel sounds [Not Tender] : non-tender [Not Distended] : not distended [Soft] : abdomen soft [Normal Anterior Cervical Nodes] : no anterior cervical lymphadenopathy [No Spinal Tenderness] : no spinal tenderness [Kyphosis] : kyphosis present [No Clubbing, Cyanosis] : no clubbing  or cyanosis of the fingernails [No Rash] : no rash [Normal Reflexes] : deep tendon reflexes were 2+ and symmetric [Normal Affect] : the affect was normal [Normal Mood] : the mood was normal [de-identified] : mild edema b/l

## 2020-11-02 ENCOUNTER — APPOINTMENT (OUTPATIENT)
Dept: INTERNAL MEDICINE | Facility: CLINIC | Age: 80
End: 2020-11-02
Payer: MEDICARE

## 2020-11-02 VITALS
DIASTOLIC BLOOD PRESSURE: 60 MMHG | TEMPERATURE: 98 F | SYSTOLIC BLOOD PRESSURE: 110 MMHG | BODY MASS INDEX: 26.03 KG/M2 | HEART RATE: 89 BPM | WEIGHT: 124 LBS | HEIGHT: 58 IN | OXYGEN SATURATION: 98 %

## 2020-11-02 PROCEDURE — 99214 OFFICE O/P EST MOD 30 MIN: CPT

## 2020-11-02 RX ORDER — IBUPROFEN 800 MG/1
800 TABLET, FILM COATED ORAL 3 TIMES DAILY
Qty: 90 | Refills: 0 | Status: DISCONTINUED | COMMUNITY
Start: 2020-06-01 | End: 2020-11-02

## 2020-11-02 RX ORDER — ACETAMINOPHEN 325 MG/1
325 TABLET, FILM COATED ORAL EVERY 6 HOURS
Qty: 240 | Refills: 0 | Status: DISCONTINUED | COMMUNITY
Start: 2020-04-06 | End: 2020-11-02

## 2020-11-02 RX ORDER — TIZANIDINE 2 MG/1
2 TABLET ORAL
Qty: 15 | Refills: 0 | Status: DISCONTINUED | COMMUNITY
Start: 2020-07-22 | End: 2020-11-02

## 2020-11-02 RX ORDER — OXYCODONE 5 MG/1
5 TABLET ORAL EVERY 8 HOURS
Qty: 90 | Refills: 0 | Status: DISCONTINUED | COMMUNITY
Start: 2020-02-10 | End: 2020-11-02

## 2020-11-02 NOTE — PHYSICAL EXAM
[No Acute Distress] : no acute distress [Well Nourished] : well nourished [No Lymphadenopathy] : no lymphadenopathy [Supple] : supple [No Respiratory Distress] : no respiratory distress  [No Accessory Muscle Use] : no accessory muscle use [Clear to Auscultation] : lungs were clear to auscultation bilaterally [Normal Rate] : normal rate  [Regular Rhythm] : with a regular rhythm [Normal S1, S2] : normal S1 and S2 [No Edema] : there was no peripheral edema [Alert and Oriented x3] : oriented to person, place, and time [de-identified] : Slender.  Petite [de-identified] : Walks with cane

## 2020-11-02 NOTE — ASSESSMENT
[Patient Optimized for Surgery] : Patient optimized for surgery [As per surgery] : as per surgery [FreeTextEntry4] : RENETTA STALEY is an 79 yo woman with hypercholesterolemia, hypothyroidism, Parkinson's disease, back pain here for a POC prior to left cataract removal.  The patient has a stable EKG.  There are no contraindications to proceeding with the planned procedure.  The patient is medically optimized.  The patient was advised to avoid NSAIDs for 7 days prior to the procedure.\par

## 2020-11-02 NOTE — HISTORY OF PRESENT ILLNESS
[No Pertinent Pulmonary History] : no history of asthma, COPD, sleep apnea, or smoking [Family Member] : no family member with adverse anesthesia reaction/sudden death [Self] : no previous adverse anesthesia reaction [Chronic Anticoagulation] : no chronic anticoagulation [Chronic Kidney Disease] : no chronic kidney disease [Diabetes] : no diabetes [(Patient denies any chest pain, claudication, dyspnea on exertion, orthopnea, palpitations or syncope)] : Patient denies any chest pain, claudication, dyspnea on exertion, orthopnea, palpitations or syncope [FreeTextEntry1] : Left eye cataract removal and lens implant [FreeTextEntry2] : 11/10/2020 [FreeTextEntry3] : Dr ALEXANDER Tariq, fax  [FreeTextEntry4] : RENETTA STALEY is an 79 yo woman with hypercholesterolemia, hypothyroidism, Parkinson's disease, back pain here for a POC prior to left cataract removal.  Hypercholesterolemia, hypothyroidism, Parkinson's stable on current medications.  Sees neurologist Dr Correa.\par \par Recovered from hip replacement earlier this year.  Walking slowly with cane.\par \par The patient is  with 2 children and 5 grandchildren.  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  She will be having a Covid nasal swab prior to surgery. She will call to schedule.

## 2020-11-07 ENCOUNTER — APPOINTMENT (OUTPATIENT)
Dept: DISASTER EMERGENCY | Facility: CLINIC | Age: 80
End: 2020-11-07

## 2020-11-07 LAB
APPEARANCE: CLEAR
BACTERIA UR CULT: NORMAL
BACTERIA: ABNORMAL
BILIRUBIN URINE: NEGATIVE
BLOOD URINE: NORMAL
COLOR: YELLOW
CREAT SPEC-SCNC: 169 MG/DL
CREAT/PROT UR: 0.2 RATIO
GLUCOSE QUALITATIVE U: NEGATIVE
HYALINE CASTS: 0 /LPF
KETONES URINE: NORMAL
LEUKOCYTE ESTERASE URINE: ABNORMAL
MICROSCOPIC-UA: NORMAL
NITRITE URINE: NEGATIVE
PH URINE: 6
PROT UR-MCNC: 32 MG/DL
PROTEIN URINE: ABNORMAL
RED BLOOD CELLS URINE: 2 /HPF
SPECIFIC GRAVITY URINE: 1.03
SQUAMOUS EPITHELIAL CELLS: 4 /HPF
UROBILINOGEN URINE: NORMAL
WHITE BLOOD CELLS URINE: 9 /HPF

## 2020-11-08 LAB — SARS-COV-2 N GENE NPH QL NAA+PROBE: NOT DETECTED

## 2020-11-10 ENCOUNTER — NON-APPOINTMENT (OUTPATIENT)
Age: 80
End: 2020-11-10

## 2020-11-10 ENCOUNTER — APPOINTMENT (OUTPATIENT)
Dept: OPHTHALMOLOGY | Facility: AMBULATORY SURGERY CENTER | Age: 80
End: 2020-11-10
Payer: MEDICARE

## 2020-11-10 PROCEDURE — 66984 XCAPSL CTRC RMVL W/O ECP: CPT | Mod: LT

## 2020-11-11 ENCOUNTER — APPOINTMENT (OUTPATIENT)
Dept: OPHTHALMOLOGY | Facility: CLINIC | Age: 80
End: 2020-11-11
Payer: MEDICARE

## 2020-11-11 ENCOUNTER — NON-APPOINTMENT (OUTPATIENT)
Age: 80
End: 2020-11-11

## 2020-11-11 PROCEDURE — 99024 POSTOP FOLLOW-UP VISIT: CPT

## 2020-11-18 ENCOUNTER — APPOINTMENT (OUTPATIENT)
Dept: OPHTHALMOLOGY | Facility: CLINIC | Age: 80
End: 2020-11-18
Payer: MEDICARE

## 2020-11-18 ENCOUNTER — NON-APPOINTMENT (OUTPATIENT)
Age: 80
End: 2020-11-18

## 2020-11-18 PROCEDURE — 99024 POSTOP FOLLOW-UP VISIT: CPT

## 2020-11-19 ENCOUNTER — NON-APPOINTMENT (OUTPATIENT)
Age: 80
End: 2020-11-19

## 2020-12-02 ENCOUNTER — APPOINTMENT (OUTPATIENT)
Dept: INTERNAL MEDICINE | Facility: CLINIC | Age: 80
End: 2020-12-02
Payer: MEDICARE

## 2020-12-02 ENCOUNTER — NON-APPOINTMENT (OUTPATIENT)
Age: 80
End: 2020-12-02

## 2020-12-02 VITALS
DIASTOLIC BLOOD PRESSURE: 80 MMHG | HEIGHT: 58 IN | HEART RATE: 81 BPM | WEIGHT: 125 LBS | SYSTOLIC BLOOD PRESSURE: 140 MMHG | TEMPERATURE: 97.3 F | BODY MASS INDEX: 26.24 KG/M2 | OXYGEN SATURATION: 99 %

## 2020-12-02 PROCEDURE — 99214 OFFICE O/P EST MOD 30 MIN: CPT

## 2020-12-02 NOTE — HISTORY OF PRESENT ILLNESS
[No Pertinent Pulmonary History] : no history of asthma, COPD, sleep apnea, or smoking [(Patient denies any chest pain, claudication, dyspnea on exertion, orthopnea, palpitations or syncope)] : Patient denies any chest pain, claudication, dyspnea on exertion, orthopnea, palpitations or syncope [Family Member] : no family member with adverse anesthesia reaction/sudden death [Self] : no previous adverse anesthesia reaction [Chronic Anticoagulation] : no chronic anticoagulation [Chronic Kidney Disease] : no chronic kidney disease [Diabetes] : no diabetes [FreeTextEntry1] : Right eye cataract removal and lens implant [FreeTextEntry2] : 12/17/2020 [FreeTextEntry3] : Dr ALEXANDER Tariq, fax  [FreeTextEntry4] : RENETTA STALEY is an 81 yo woman with hypercholesterolemia, hypothyroidism, Parkinson's disease, back pain here for a POC prior to right cataract removal.  Hypercholesterolemia, hypothyroidism, Parkinson's stable on current medications.  Sees neurologist Dr Correa.\par \par Recovered well from left cataract removal one month ago.  Can watch TV without glasses.  Recovered from hip replacement earlier this year.  Walking slowly with cane.\par \par The patient is  with 2 children and 5 grandchildren.  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  She will be having a Covid nasal swab prior to surgery. She will call to schedule.

## 2020-12-02 NOTE — PHYSICAL EXAM
[No Acute Distress] : no acute distress [Well Nourished] : well nourished [No Lymphadenopathy] : no lymphadenopathy [Supple] : supple [No Respiratory Distress] : no respiratory distress  [No Accessory Muscle Use] : no accessory muscle use [Clear to Auscultation] : lungs were clear to auscultation bilaterally [Normal Rate] : normal rate  [Regular Rhythm] : with a regular rhythm [Normal S1, S2] : normal S1 and S2 [No Edema] : there was no peripheral edema [Alert and Oriented x3] : oriented to person, place, and time [de-identified] : Slender.  Petite [de-identified] : Walks with cane

## 2020-12-02 NOTE — ASSESSMENT
[Patient Optimized for Surgery] : Patient optimized for surgery [As per surgery] : as per surgery [FreeTextEntry4] : RENETTA STALEY is an 79 yo woman with hypercholesterolemia, hypothyroidism, Parkinson's disease, back pain here for a POC prior to right cataract removal.  The patient has a stable EKG.  There are no contraindications to proceeding with the planned procedure.  The patient is medically optimized.  The patient was advised to avoid NSAIDs for 7 days prior to the procedure.\par

## 2020-12-11 ENCOUNTER — APPOINTMENT (OUTPATIENT)
Dept: OPHTHALMOLOGY | Facility: CLINIC | Age: 80
End: 2020-12-11
Payer: MEDICARE

## 2020-12-11 ENCOUNTER — NON-APPOINTMENT (OUTPATIENT)
Age: 80
End: 2020-12-11

## 2020-12-11 PROCEDURE — 92136 OPHTHALMIC BIOMETRY: CPT | Mod: 26,RT

## 2020-12-11 PROCEDURE — 99024 POSTOP FOLLOW-UP VISIT: CPT

## 2020-12-29 ENCOUNTER — APPOINTMENT (OUTPATIENT)
Dept: ORTHOPEDIC SURGERY | Facility: CLINIC | Age: 80
End: 2020-12-29
Payer: MEDICARE

## 2020-12-29 PROCEDURE — 99214 OFFICE O/P EST MOD 30 MIN: CPT | Mod: 95

## 2021-01-19 NOTE — DISCHARGE NOTE PROVIDER - NSDCQMERRANDS_GEN_ALL_CORE
Dispense Refill    metoprolol succinate (TOPROL XL) 50 MG extended release tablet TAKE 1 TABLET DAILY 90 tablet 3    fluticasone (FLONASE) 50 MCG/ACT nasal spray 2 sprays by Nasal route daily (Patient taking differently: 2 sprays by Nasal route daily as needed ) 1 Bottle 5    Multiple Vitamins-Minerals (MULTIVITAMIN PO) Take 1 tablet by mouth daily. No current facility-administered medications for this visit. No Known Allergies      Review of Systems   Constitutional: Negative. HENT: Negative. Eyes: Negative. Respiratory: Negative. Cardiovascular: Negative. Gastrointestinal: Negative. Endocrine: Negative. Genitourinary: Negative. Musculoskeletal: Negative. Skin: Negative. Allergic/Immunologic: Negative. Neurological: Negative. Hematological: Negative. Psychiatric/Behavioral: Negative. Objective:   Physical Exam  Constitutional:       General: He is not in acute distress. Appearance: He is well-developed. HENT:      Head: Normocephalic and atraumatic. Right Ear: External ear normal.      Left Ear: External ear normal.      Mouth/Throat:      Pharynx: No oropharyngeal exudate. Eyes:      General: No scleral icterus. Conjunctiva/sclera: Conjunctivae normal.   Neck:      Musculoskeletal: Neck supple. Thyroid: No thyromegaly. Cardiovascular:      Rate and Rhythm: Normal rate and regular rhythm. Heart sounds: Normal heart sounds. No murmur. Pulmonary:      Effort: Pulmonary effort is normal. No respiratory distress. Breath sounds: Normal breath sounds. No wheezing. Abdominal:      General: Bowel sounds are normal. There is no distension. Palpations: Abdomen is soft. Tenderness: There is no abdominal tenderness. There is no rebound. Musculoskeletal: Normal range of motion. General: No tenderness. Skin:     General: Skin is warm and dry. Findings: No erythema or rash.    Neurological:      Mental Status: He is alert and oriented to person, place, and time. Psychiatric:         Behavior: Behavior normal.         Thought Content:  Thought content normal.         Judgment: Judgment normal.       /64 (Site: Left Upper Arm, Position: Sitting, Cuff Size: Large Adult)   Pulse 68   Ht 5' 9\" (1.753 m)   Wt 204 lb (92.5 kg)   BMI 30.13 kg/m²   Results for orders placed or performed during the hospital encounter of 12/31/20   CBC Auto Differential   Result Value Ref Range    WBC 4.2 3.5 - 11.3 k/uL    RBC 4.74 4.21 - 5.77 m/uL    Hemoglobin 14.7 13.0 - 17.0 g/dL    Hematocrit 45.3 40.7 - 50.3 %    MCV 95.6 82.6 - 102.9 fL    MCH 31.0 25.2 - 33.5 pg    MCHC 32.5 25.2 - 33.5 g/dL    RDW 13.1 11.8 - 14.4 %    Platelets 963 000 - 615 k/uL    MPV 9.0 8.1 - 13.5 fL    NRBC Automated 0.0 0.0 per 100 WBC    Differential Type NOT REPORTED     Seg Neutrophils 63 36 - 65 %    Lymphocytes 22 (L) 24 - 43 %    Monocytes 11 3 - 12 %    Eosinophils % 3 1 - 4 %    Basophils 1 0 - 2 %    Immature Granulocytes 0 0 %    Segs Absolute 2.65 1.50 - 8.10 k/uL    Absolute Lymph # 0.94 (L) 1.10 - 3.70 k/uL    Absolute Mono # 0.46 0.10 - 1.20 k/uL    Absolute Eos # 0.14 0.00 - 0.44 k/uL    Basophils Absolute 0.03 0.00 - 0.20 k/uL    Absolute Immature Granulocyte <0.03 0.00 - 0.30 k/uL    WBC Morphology NOT REPORTED     RBC Morphology NOT REPORTED     Platelet Estimate NOT REPORTED    Basic Metabolic Panel   Result Value Ref Range    Glucose 109 (H) 70 - 99 mg/dL    BUN 16 8 - 23 mg/dL    CREATININE 0.79 0.70 - 1.20 mg/dL    Bun/Cre Ratio 20 9 - 20    Calcium 9.4 8.6 - 10.4 mg/dL    Sodium 143 135 - 144 mmol/L    Potassium 4.3 3.7 - 5.3 mmol/L    Chloride 106 98 - 107 mmol/L    CO2 27 20 - 31 mmol/L    Anion Gap 10 9 - 17 mmol/L    GFR Non-African American >60 >60 mL/min    GFR African American >60 >60 mL/min    GFR Comment          GFR Staging NOT REPORTED    Hemoglobin A1C   Result Value Ref Range    Hemoglobin A1C 5.9 4.0 - 6.0 % Yes

## 2021-01-25 ENCOUNTER — APPOINTMENT (OUTPATIENT)
Dept: INTERNAL MEDICINE | Facility: CLINIC | Age: 81
End: 2021-01-25
Payer: MEDICARE

## 2021-01-25 VITALS
HEIGHT: 59 IN | DIASTOLIC BLOOD PRESSURE: 80 MMHG | WEIGHT: 125 LBS | OXYGEN SATURATION: 97 % | TEMPERATURE: 97.5 F | BODY MASS INDEX: 25.2 KG/M2 | HEART RATE: 78 BPM | SYSTOLIC BLOOD PRESSURE: 120 MMHG

## 2021-01-25 PROCEDURE — 99214 OFFICE O/P EST MOD 30 MIN: CPT

## 2021-01-25 RX ORDER — TRAMADOL HYDROCHLORIDE 50 MG/1
50 TABLET, COATED ORAL
Qty: 90 | Refills: 0 | Status: DISCONTINUED | COMMUNITY
Start: 2020-12-28 | End: 2021-01-25

## 2021-01-25 RX ORDER — GABAPENTIN 100 MG/1
100 CAPSULE ORAL
Qty: 90 | Refills: 1 | Status: DISCONTINUED | COMMUNITY
Start: 2020-12-28 | End: 2021-01-25

## 2021-01-25 RX ORDER — GABAPENTIN 100 MG/1
100 CAPSULE ORAL
Qty: 90 | Refills: 1 | Status: DISCONTINUED | COMMUNITY
Start: 2020-08-14 | End: 2021-01-25

## 2021-01-25 NOTE — HISTORY OF PRESENT ILLNESS
[No Pertinent Cardiac History] : no history of aortic stenosis, atrial fibrillation, coronary artery disease, recent myocardial infarction, or implantable device/pacemaker [No Pertinent Pulmonary History] : no history of asthma, COPD, sleep apnea, or smoking [Family Member] : no family member with adverse anesthesia reaction/sudden death [Self] : no previous adverse anesthesia reaction [Chronic Anticoagulation] : no chronic anticoagulation [Chronic Kidney Disease] : no chronic kidney disease [Diabetes] : no diabetes [(Patient denies any chest pain, claudication, dyspnea on exertion, orthopnea, palpitations or syncope)] : Patient denies any chest pain, claudication, dyspnea on exertion, orthopnea, palpitations or syncope [FreeTextEntry1] : Right eye cataract removal and lens implant [FreeTextEntry2] : 2/9/2021 [FreeTextEntry3] : Dr ALEXANDER Tariq, fax  [FreeTextEntry4] : RENETTA STALEY is an 81 yo woman with hypercholesterolemia, hypothyroidism, Parkinson's disease, back pain here for a POC prior to right cataract removal.  Hypercholesterolemia, hypothyroidism, Parkinson's stable on current medications.  Sees neurologist Dr Correa.\par \par Recovered well from left cataract removal a few months ago.  Can watch TV without glasses.  Recovered from hip replacement.  Walking slowly with cane.\par \par The patient is  with 2 children and 5 grandchildren.  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  She will be having a Covid nasal swab prior to surgery. She will call to schedule.

## 2021-01-25 NOTE — PHYSICAL EXAM
[Well Nourished] : well nourished [No Lymphadenopathy] : no lymphadenopathy [Supple] : supple [No Respiratory Distress] : no respiratory distress  [No Accessory Muscle Use] : no accessory muscle use [Clear to Auscultation] : lungs were clear to auscultation bilaterally [Normal Rate] : normal rate  [Regular Rhythm] : with a regular rhythm [Normal S1, S2] : normal S1 and S2 [No Edema] : there was no peripheral edema [Alert and Oriented x3] : oriented to person, place, and time [de-identified] : Slender.  Petite [de-identified] : Walks with cane

## 2021-02-06 ENCOUNTER — APPOINTMENT (OUTPATIENT)
Dept: DISASTER EMERGENCY | Facility: CLINIC | Age: 81
End: 2021-02-06

## 2021-02-06 DIAGNOSIS — Z01.818 ENCOUNTER FOR OTHER PREPROCEDURAL EXAMINATION: ICD-10-CM

## 2021-02-07 LAB — SARS-COV-2 N GENE NPH QL NAA+PROBE: NOT DETECTED

## 2021-02-09 ENCOUNTER — NON-APPOINTMENT (OUTPATIENT)
Age: 81
End: 2021-02-09

## 2021-02-09 ENCOUNTER — APPOINTMENT (OUTPATIENT)
Dept: OPHTHALMOLOGY | Facility: AMBULATORY SURGERY CENTER | Age: 81
End: 2021-02-09
Payer: MEDICARE

## 2021-02-09 PROCEDURE — 66984 XCAPSL CTRC RMVL W/O ECP: CPT | Mod: RT

## 2021-02-10 ENCOUNTER — APPOINTMENT (OUTPATIENT)
Dept: OPHTHALMOLOGY | Facility: CLINIC | Age: 81
End: 2021-02-10
Payer: MEDICARE

## 2021-02-10 ENCOUNTER — NON-APPOINTMENT (OUTPATIENT)
Age: 81
End: 2021-02-10

## 2021-02-10 PROCEDURE — 99024 POSTOP FOLLOW-UP VISIT: CPT

## 2021-02-10 NOTE — PHYSICAL THERAPY INITIAL EVALUATION ADULT - ASSISTIVE DEVICE:SIT/SUPINE, REHAB EVAL
Reached out to pt via phone since she did not join [facility] visit. Left voicemail with behavioral access number to reschedule if needed.    bed rails

## 2021-02-17 ENCOUNTER — NON-APPOINTMENT (OUTPATIENT)
Age: 81
End: 2021-02-17

## 2021-02-17 ENCOUNTER — APPOINTMENT (OUTPATIENT)
Dept: OPHTHALMOLOGY | Facility: CLINIC | Age: 81
End: 2021-02-17
Payer: MEDICARE

## 2021-02-17 PROCEDURE — 99024 POSTOP FOLLOW-UP VISIT: CPT

## 2021-03-05 ENCOUNTER — APPOINTMENT (OUTPATIENT)
Dept: ORTHOPEDIC SURGERY | Facility: CLINIC | Age: 81
End: 2021-03-05
Payer: MEDICARE

## 2021-03-05 PROCEDURE — 99214 OFFICE O/P EST MOD 30 MIN: CPT

## 2021-03-10 ENCOUNTER — NON-APPOINTMENT (OUTPATIENT)
Age: 81
End: 2021-03-10

## 2021-03-10 ENCOUNTER — APPOINTMENT (OUTPATIENT)
Dept: OPHTHALMOLOGY | Facility: CLINIC | Age: 81
End: 2021-03-10
Payer: MEDICARE

## 2021-03-10 PROCEDURE — 99024 POSTOP FOLLOW-UP VISIT: CPT

## 2021-03-12 ENCOUNTER — APPOINTMENT (OUTPATIENT)
Dept: INTERNAL MEDICINE | Facility: CLINIC | Age: 81
End: 2021-03-12
Payer: MEDICARE

## 2021-03-12 VITALS
DIASTOLIC BLOOD PRESSURE: 86 MMHG | HEIGHT: 59 IN | OXYGEN SATURATION: 97 % | HEART RATE: 74 BPM | BODY MASS INDEX: 24.39 KG/M2 | TEMPERATURE: 97.16 F | WEIGHT: 121 LBS | SYSTOLIC BLOOD PRESSURE: 134 MMHG

## 2021-03-12 VITALS — DIASTOLIC BLOOD PRESSURE: 80 MMHG | HEART RATE: 92 BPM | SYSTOLIC BLOOD PRESSURE: 130 MMHG

## 2021-03-12 DIAGNOSIS — Z01.818 ENCOUNTER FOR OTHER PREPROCEDURAL EXAMINATION: ICD-10-CM

## 2021-03-12 DIAGNOSIS — I95.1 ORTHOSTATIC HYPOTENSION: ICD-10-CM

## 2021-03-12 PROCEDURE — 36415 COLL VENOUS BLD VENIPUNCTURE: CPT

## 2021-03-12 PROCEDURE — 99214 OFFICE O/P EST MOD 30 MIN: CPT | Mod: 25

## 2021-03-12 RX ORDER — ACETAMINOPHEN 500 MG
500 TABLET ORAL TWICE DAILY
Refills: 0 | Status: ACTIVE | COMMUNITY

## 2021-03-12 RX ORDER — CALCIUM CARBONATE/VITAMIN D3 600 MG-10
TABLET ORAL
Refills: 0 | Status: DISCONTINUED | COMMUNITY
End: 2021-03-12

## 2021-03-12 RX ORDER — TURMERIC/TURMERIC EXT/PEPR EXT 900-100 MG
CAPSULE ORAL
Refills: 0 | Status: DISCONTINUED | COMMUNITY
End: 2021-03-12

## 2021-03-12 RX ORDER — TRAMADOL HYDROCHLORIDE 50 MG/1
50 TABLET, COATED ORAL
Qty: 90 | Refills: 0 | Status: DISCONTINUED | COMMUNITY
Start: 2020-06-19 | End: 2021-03-12

## 2021-03-12 RX ORDER — IBUPROFEN 800 MG/1
800 TABLET, FILM COATED ORAL
Qty: 60 | Refills: 0 | Status: DISCONTINUED | COMMUNITY
Start: 2020-12-28 | End: 2021-03-12

## 2021-03-13 PROBLEM — Z01.818 PREOPERATIVE CLEARANCE: Status: RESOLVED | Noted: 2019-02-16 | Resolved: 2021-03-13

## 2021-03-13 PROBLEM — Z01.818 PRE-OP TESTING: Status: RESOLVED | Noted: 2021-02-06 | Resolved: 2021-03-13

## 2021-03-13 NOTE — PHYSICAL EXAM
[No Acute Distress] : no acute distress [Well Nourished] : well nourished [Well Developed] : well developed [Well-Appearing] : well-appearing [Normal Voice/Communication] : normal voice/communication [Normal Sclera/Conjunctiva] : normal sclera/conjunctiva [PERRL] : pupils equal round and reactive to light [EOMI] : extraocular movements intact [Normal Outer Ear/Nose] : the outer ears and nose were normal in appearance [Normal Oropharynx] : the oropharynx was normal [Normal TMs] : both tympanic membranes were normal [No JVD] : no jugular venous distention [Supple] : supple [No Lymphadenopathy] : no lymphadenopathy [Thyroid Normal, No Nodules] : the thyroid was normal and there were no nodules present [No Respiratory Distress] : no respiratory distress  [Clear to Auscultation] : lungs were clear to auscultation bilaterally [No Accessory Muscle Use] : no accessory muscle use [Normal Percussion] : the chest was normal to percussion [Normal Rate] : normal rate  [Regular Rhythm] : with a regular rhythm [Normal S1, S2] : normal S1 and S2 [No Murmur] : no murmur heard [No Carotid Bruits] : no carotid bruits [No Abdominal Bruit] : a ~M bruit was not heard ~T in the abdomen [No Varicosities] : no varicosities [Pedal Pulses Present] : the pedal pulses are present [No Edema] : there was no peripheral edema [No Extremity Clubbing/Cyanosis] : no extremity clubbing/cyanosis [No Palpable Aorta] : no palpable aorta [Normal Appearance] : normal in appearance [No Nipple Discharge] : no nipple discharge [No Axillary Lymphadenopathy] : no axillary lymphadenopathy [Soft] : abdomen soft [Non Tender] : non-tender [Non-distended] : non-distended [No Masses] : no abdominal mass palpated [No HSM] : no HSM [Normal Bowel Sounds] : normal bowel sounds [Normal Supraclavicular Nodes] : no supraclavicular lymphadenopathy [Normal Axillary Nodes] : no axillary lymphadenopathy [Normal Posterior Cervical Nodes] : no posterior cervical lymphadenopathy [Normal Anterior Cervical Nodes] : no anterior cervical lymphadenopathy [Normal Inguinal Nodes] : no inguinal lymphadenopathy [No CVA Tenderness] : no CVA  tenderness [No Spinal Tenderness] : no spinal tenderness [No Joint Swelling] : no joint swelling [Grossly Normal Strength/Tone] : grossly normal strength/tone [No Rash] : no rash [No Skin Lesions] : no skin lesions [Coordination Grossly Intact] : coordination grossly intact [Deep Tendon Reflexes (DTR)] : deep tendon reflexes were 2+ and symmetric [Speech Grossly Normal] : speech grossly normal [Memory Grossly Normal] : memory grossly normal [Normal Affect] : the affect was normal [Alert and Oriented x3] : oriented to person, place, and time [Normal Mood] : the mood was normal [Normal Insight/Judgement] : insight and judgment were intact [Kyphosis] : no kyphosis [Scoliosis] : no scoliosis [de-identified] : masked facies.  No tremor or rigidity.  Motor5/5 bilaterally.  Walking with a cane

## 2021-03-13 NOTE — ASSESSMENT
[FreeTextEntry1] : Her major issue is ongoing low back pain and she will followup with pain management. We discussed Cymbalta as an alternative but interacts with her rasagiline.. I suggested she switch her ibuprofen to naproxen is a b.i.d. drug and might provide more long-lasting relief. I also advised she take her omeprazole on a daily basis for gastric protection. Prescriptions were sent to her pharmacy. We will check CBC and renal  function on chronic NSAIDS and LFTs on chronic NSAIDs.  She will get evaluated by pain management. \par She is mildly orthostatic and is to change positions slowly and to stay well hydrated.\par She will followup with neurology for her history of Parkinson's\par She'll be seen here again in 3 months

## 2021-03-13 NOTE — HISTORY OF PRESENT ILLNESS
[FreeTextEntry1] : Lumbar radiculopathy\par Parkinson's disease with orthostasis\par Chronic NSAID usage\par Hypothyroidism [de-identified] : She is still experiencing low back pain that can radiate down to her buttocks and her posterior thighs. This is worse in the left greater than the right. She has been seen by orthopedics Dr. Patricia and was advised to see pain management Dr. Briceño as patient is felt not to be a candidate for further surgery. She cannot get an appointment with Dr. Briceño until May. She overall feels she is slowing down as more issues with dexterity in her hands .  She is planning to see neuro next week. She continues to go to physical therapy for flexibility. She remains independent with all activities and is driving. She has not fallen.   She is walking with a cane. Her bowels are controlled with prn lactulose.   She has rare dysphagia. She completed her Covid 19 vaccine.series.

## 2021-03-17 LAB
ALBUMIN SERPL ELPH-MCNC: 4.5 G/DL
ALP BLD-CCNC: 54 U/L
ALT SERPL-CCNC: 6 U/L
ANION GAP SERPL CALC-SCNC: 14 MMOL/L
AST SERPL-CCNC: 35 U/L
BASOPHILS # BLD AUTO: 0.09 K/UL
BASOPHILS NFR BLD AUTO: 0.9 %
BILIRUB SERPL-MCNC: 0.4 MG/DL
BUN SERPL-MCNC: 37 MG/DL
CALCIUM SERPL-MCNC: 10 MG/DL
CHLORIDE SERPL-SCNC: 102 MMOL/L
CHOLEST SERPL-MCNC: 189 MG/DL
CO2 SERPL-SCNC: 24 MMOL/L
CREAT SERPL-MCNC: 0.8 MG/DL
EOSINOPHIL # BLD AUTO: 0.09 K/UL
EOSINOPHIL NFR BLD AUTO: 0.9 %
ESTIMATED AVERAGE GLUCOSE: 120 MG/DL
GLUCOSE SERPL-MCNC: 97 MG/DL
HBA1C MFR BLD HPLC: 5.8 %
HCT VFR BLD CALC: 41.7 %
HDLC SERPL-MCNC: 68 MG/DL
HGB BLD-MCNC: 13 G/DL
IMM GRANULOCYTES NFR BLD AUTO: 0.2 %
LDLC SERPL CALC-MCNC: 102 MG/DL
LYMPHOCYTES # BLD AUTO: 1.57 K/UL
LYMPHOCYTES NFR BLD AUTO: 15.3 %
MAN DIFF?: NORMAL
MCHC RBC-ENTMCNC: 28.8 PG
MCHC RBC-ENTMCNC: 31.2 GM/DL
MCV RBC AUTO: 92.5 FL
MONOCYTES # BLD AUTO: 0.65 K/UL
MONOCYTES NFR BLD AUTO: 6.4 %
NEUTROPHILS # BLD AUTO: 7.81 K/UL
NEUTROPHILS NFR BLD AUTO: 76.3 %
NONHDLC SERPL-MCNC: 121 MG/DL
PLATELET # BLD AUTO: 307 K/UL
POTASSIUM SERPL-SCNC: 4.1 MMOL/L
PROT SERPL-MCNC: 7 G/DL
RBC # BLD: 4.51 M/UL
RBC # FLD: 15.9 %
SODIUM SERPL-SCNC: 140 MMOL/L
T4 FREE SERPL-MCNC: 1.7 NG/DL
TRIGL SERPL-MCNC: 98 MG/DL
TSH SERPL-ACNC: 1.02 UIU/ML
WBC # FLD AUTO: 10.23 K/UL

## 2021-05-05 ENCOUNTER — APPOINTMENT (OUTPATIENT)
Dept: PAIN MANAGEMENT | Facility: CLINIC | Age: 81
End: 2021-05-05
Payer: MEDICARE

## 2021-05-05 VITALS — TEMPERATURE: 207.14 F

## 2021-05-05 VITALS
HEART RATE: 78 BPM | WEIGHT: 121 LBS | BODY MASS INDEX: 24.39 KG/M2 | HEIGHT: 59 IN | SYSTOLIC BLOOD PRESSURE: 136 MMHG | DIASTOLIC BLOOD PRESSURE: 69 MMHG

## 2021-05-05 PROCEDURE — 99204 OFFICE O/P NEW MOD 45 MIN: CPT

## 2021-05-06 ENCOUNTER — APPOINTMENT (OUTPATIENT)
Dept: ORTHOPEDIC SURGERY | Facility: CLINIC | Age: 81
End: 2021-05-06
Payer: MEDICARE

## 2021-05-06 PROCEDURE — 99214 OFFICE O/P EST MOD 30 MIN: CPT

## 2021-05-06 NOTE — HISTORY OF PRESENT ILLNESS
[Prolonged Standing] : worsened by prolonged standing [Standing] : worsened by standing [NSAIDs] : relieved by nonsteroidal anti-inflammatory drugs [Opioid Analgesics] : relieved by opioid analgesics [de-identified] : Pt is  s/p  L34 laminectomy on 01/18/20, progressive spondylolisthesis L34,. Pt presents today for f/u and to discuss surgical intervention.  Pt c/o low back pain exacerbation for the pasty 1 month, She denies recent injury/falls.  radiating to B/L thighs L>R, tingling on LLE when changing positions,  Pt did not have EMG done. Pt denies numbness or weakness on B/L LE.  Pt takes Ibuprofen 800mg and Tylenol ES 1000mg.  Pt Uses cane to ambulate. Pt participates with PT, this provides no pain relief. Pt denies fever, chills, weight changes, loss of bladder control, bowel incontinence or urinary retention or saddle anesthesia. \par \par  [Ataxia] : no ataxia [Incontinence] : no incontinence [Loss of Dexterity] : good dexterity [Urinary Ret.] : no urinary retention [de-identified] : raising from chair, recumbency exacerbates pain

## 2021-05-06 NOTE — DISCUSSION/SUMMARY
[de-identified] : 80 yo female s/p lumbar surgery, doing very well, recommend continued conservative management and care. \par \par  Diagnosis, prognosis, natural history and treatment was discussed with patient. Patient was advised if the following symptoms develop: chills, fever, \par loss of bladder control, bowel incontinence or urinary retention, numbness/tingling or weakness is present in upper or lower extremities, to go to the nearest emergency room. This may be a new clinical condition not present at the time of the patient visit  that may lead to paralysis and/or death, Patient advised if the above symptoms developed to also call the office immediately to inform us and to go to the nearest emergency room.\par \par

## 2021-05-06 NOTE — PHYSICAL EXAM
[Cane] : ambulates with cane [] : Motor: [NL] : Motor strength of the left lower extremity was normal [UE/LE] : Sensory: Intact in bilateral upper & lower extremities [2+] : left brachioradialis 2+ [1+] : left patella 1+ [ALL] : dorsalis pedis, posterior tibial, femoral, popliteal, and radial 2+ and symmetric bilaterally [Normal] : Oriented to person, place, and time, insight and judgement were intact and the affect was normal [Collins's Sign] : negative Collins's sign [Pronator Drift] : negative pronator drift [SLR] : negative straight leg raise [de-identified] : Lumbar ROM:  limited, painful\par NTTP L spine and b/l paraspinals L region. \par Skin intact L spine. No rashes, ulcers, blisters. \par Well healed posterior incision\par \par  [de-identified] : 2 views AP and Lat of Lumbar Spine from [id number] 09/22/20. Read and dictated by Dr. Tee Patricia Board Certified Orthopaedic surgeon L34 spondylolisthesis Grade 1 \par \par \par EXAM: MR SPINE LUMBAR WAW IC \par \par \par PROCEDURE DATE: 08/08/2020 \par \par \par \par INTERPRETATION: LUMBOSACRAL SPINE MRI \par \par CLINICAL INFORMATION: Left lower back and hip pain. \par TECHNIQUE: Multiplanar, multisequence MR imaging was obtained of the lumbosacral spine with and without the administration of intravenous contrast. 5.5 cc of Gadavist were administered intravenously. 2.0 cc were discarded. \par \par COMPARISON: Lumbar spine MRI 9/26/2019. \par \par FINDINGS: \par \par DISC LEVEL EVALUATION: \par \par L1/L2: No spinal canal or foraminal narrowing. \par L2/L3: Small disc bulge with superimposed right foraminal protrusion and bilateral facet arthrosis and thickening of the ligamentum flavum resulting mild spinal canal narrowing and mild bilateral foraminal narrowing, right greater than left. No significant change compared with prior exam. \par L3/L4: Status post decompression. Anterolisthesis with uncovering of the disc with small disc bulge, resulting moderate to severe bilateral foraminal narrowing, increased when compared with prior MRI. No spinal canal narrowing. \par L4/L5: Small diffuse disc bulge and mild facet arthrosis resulting in mild to moderate bilateral foraminal narrowing. No spinal canal narrowing. No change when compared with prior MRI. \par L5/S1: Bilateral facet arthrosis. Mild right foraminal narrowing. No spinal canal or left foraminal narrowing. \par \par SPINAL ALIGNMENT: Preservation of the vertebral body heights. There is grade 1 anterolisthesis of L3 on L4, which has increased when compared with prior MRI. There is grade 1 anterolisthesis of L4 and L5, not changed when compared with prior MRI. There is mild rightward lateral subluxation of L4 on L5. Degenerative endplate marrow edema at L3/L4. Status post laminectomies at L3/L4 and L4/L5. \par DISTAL CORD AND CONUS: Cord terminates at L1/L2. Cauda equina are unremarkable. \par SI JOINTS: Sacroiliac joints are intact. \par PARASPINAL MUSCLE AND SOFT TISSUES: Atrophy of the left iliopsoas muscle. Mild paraspinal muscle edema to the level of the sacrum. \par INTRAABDOMINAL/INTRAPELVIC SOFT TISSUES: Unremarkable. \par \par IMPRESSION: \par Status post laminectomies at L3/L4 and L4/L5. \par Increase in anterolisthesis of L3 on L4 with moderate to severe bilateral foraminal narrowing. No spinal canal narrowing at this level. \par Mild spinal canal and foraminal narrowing at L2/L3 and mild to moderate foraminal narrowing at L4/L5, unchanged from prior exam. \par \par \par QUENTIN GLASER M.D., ATTENDING RADIOLOGIST \par This document has been electronically signed. Aug 11 2020 10:32AM \par \par \par 2 views AP and Lat of LS Spine from [id number] . Read and dictated by Dr. Tee Patricia Board Certified orthopaedic surgeon 6/9/2020 L34 Spondylolisthesis grade 1 \par \par

## 2021-05-20 ENCOUNTER — APPOINTMENT (OUTPATIENT)
Dept: PAIN MANAGEMENT | Facility: CLINIC | Age: 81
End: 2021-05-20

## 2021-05-30 NOTE — PHYSICAL EXAM
[FreeTextEntry1] : Constitutional: No signs of distress. No signs of toxicity. \par MS: Alert and well oriented. Speech fluent. No aphasia. Fund of knowledge intact. Masked facies\par Psychiatric: Mood stable.\par CN: PERRLA: No papilledema; No VFC: No [place]. V1-3 intact. \par Motor: Appears slow with bradykinesia of both UEs; no tremor noted; adequate bulk, strength. 5/5 strength\par DTR: present and symmetrical; no clonus\par Sensory: intact to primary and secondary modalities\par Cerebellar: intact\par Gait; ambulates with cane\par Eyes: no redness or swelling\par HEENT: intact\par Neck: No masses noted\par Pulmonary: no respiratory distress\par Vascular: no temperature,color changes; no edema\par Musculoskeletal: examination of the cervical spine reveals no midline tenderness, range of motion full upon flexion, extension and lateral rotation. Negative facet tenderness, Negative Spurlings bilaterally. examination of the lumbar spine reveals no midline or paraspinal tenderness; Range of motion full upon flexion, extension and lateral rotation; negative facet loading, No tenderness of sciatic notch, No tenderness of bilateral greater trochanters, Negative DARIANA, negative SLRT bilaterally,\par Skin: No rash.\par

## 2021-05-30 NOTE — ASSESSMENT
[FreeTextEntry1] : Chronic pain syndrome\par PD by hx\par Requesting MM\par Patient has undergone surgery and multiple trials of medication wo relief.\par She is a candidate for MM. No contraindications \par 1:1 ratio\par Patient is scheduling a patient with our NP in about 2 weeks to obtain BRENNA\par [email or web address].\par \par \par 45 min

## 2021-05-30 NOTE — HISTORY OF PRESENT ILLNESS
[FreeTextEntry1] : Location: back and leg pain left > right\par Description: deep achy and sharp shooting pain\par Onset: 2018 - gradual onset - no trigger \par Frequency: Intermittent\par Severity: 10/10  worst,  0 / 10 best\par Aggravating factors:stand, walk 5-10 minutes\par Alleviating factors: sitting on a hard back chair; bending forward\par Negative for weakness, sensory level, bowel or bladder dysfunction\par Interferes with function: QOL\par Comorbidities:REM sleep disrder\par Imaging: MRI lumbar spine enclosed\par Prior medications:gabapentin no relief, oxycodone provided some relief; tramadol no relief\par Current medications:1000 mgs tidl Naproxyn 500 mgs bid am only diminishes the pain to minimal degree. \par Interventions:ESIs failed to provide relief - then underwent left hip surgery 2019 followed by the right,2020 2019 Laminectomy and discectomy 2019/2020 with spinal fusion with transient relief. \par

## 2021-06-01 ENCOUNTER — NON-APPOINTMENT (OUTPATIENT)
Age: 81
End: 2021-06-01

## 2021-06-07 ENCOUNTER — APPOINTMENT (OUTPATIENT)
Dept: ORTHOPEDIC SURGERY | Facility: CLINIC | Age: 81
End: 2021-06-07

## 2021-06-15 ENCOUNTER — APPOINTMENT (OUTPATIENT)
Dept: PHYSICAL MEDICINE AND REHAB | Facility: CLINIC | Age: 81
End: 2021-06-15
Payer: MEDICARE

## 2021-06-15 PROCEDURE — 99204 OFFICE O/P NEW MOD 45 MIN: CPT

## 2021-06-15 NOTE — ASSESSMENT
[FreeTextEntry1] : 82 yo F w/ PMH lumbar fusion who presents w/ low back pain with radiation into the left lower extremity consistent with lumbar radiculopathy secondary to lumbosacral stenosis and lumbar disc herniation.\par \par I had an extended discussion with Ms. Edward on this visit.  Various topics were covered including diagnosis, treatment options, and prognosis.  I discussed various spinal interventions with Ms. Edward and her daughter (via videochat from [place], [place]) on this visit including lumbar ELE, SCS, and lumbar trigger point injections.  Ms. Edward is reluctant to pursue further spinal interventions at this time.  We also discussed several PO medication options and further PT/HEP.  Anticipatory guidance provided including instructions on how to manage future flares were discussed.  Red flag signs and symptoms were reviewed and patient instructed to seek immediate medical attention should these arise.  Patient and her daughter had questions and all were answered by the conclusion of this visit.\par \par -MRI lumbar spine w/ and w/o IV contrast reviewed\par -Orthopedic, Neurology and most recent int. med. notes reviewed\par -Start acupuncture, new referral provided\par -Start lyrica 25 mg PO qHS prn pain.  Risks and benefits of this medication were reviewed.  Patient denies a hx of CKD and most renal function labs are wnl.  [place] I-stop reviewed and no signs of abuse.   Will monitor and uptitrate as tolerated.\par -RTC 6 weeks\par \par Liam Spicer MD\par Spine and Sports Medicine\par \par [employer]\par At [facility]\par \par

## 2021-06-15 NOTE — HISTORY OF PRESENT ILLNESS
[FreeTextEntry1] : 80 yo F with PMH L3-L4 laminectomy 1/2020, parkinson's disease, hip AVN who presents with low back pain.\par \par Onset: several years but worse over the last year.  No inciting events, trauma, or falls.\par Location: lower lumbar spine\par Characteristics: sharp \par Aggravating factors: prolonged standing, prolonged walking, sit to stand transfers\par Alleviating factors: rest\par Radiation: left lower extremity (left thigh)\par Treatments: tylenol, naproxen, rest, physical therapy, HEP with some relief.  Patient has had lumbar spine injections previously but is not interested in pursuing these interventions at this time.   Patient has tried gabapentin with minimal relief in her pain. \par Severity: 7-9/10\par \par Diagnostic studies:\par MRI lumbar spine 8/2020 showing post laminectomies at L3-L4 and L4-L5 with an increase in anterolisthesis of L3 on L4 with moderate to severe bilateral foraminal narrowing.\par No previous EMG/NCS\par \par Patient denies new weakness, numbness or paresthesia.  Patient denies bowel/bladder dysfunction, fevers, chills, weight loss, night pain, or night sweats.\par

## 2021-06-15 NOTE — PHYSICAL EXAM
[FreeTextEntry1] : Gen: NAD\par HEENT: neck supple\par CV: no cyanosis\par Pulm: breathing well on room air\par Abd: soft\par Low back: range of motion limited by pain, tenderness to palpation lower lumbar paraspinals, neg seated straight leg raise, further provocative testing was bypassed given patient's discomfort with prone and supine lying.\par Msk: \par 5/5 hip flexion B/L, 5/5 knee extension B/L, 5/5 knee flexion B/L, 5/5 dorsiflexion B/L, 5/5 EHL B/L, 5/5 plantar flexion B/L\par 5/5 shoulder abduction B/L, 5/5 elbow flexion B/L, 5/5 elbow extension B/L, 5/5 wrist extension B/L, 5/5 hand  B/L\par Neuro: sensation intact to light touch in bilateral upper and lower extremities, reflexes 2+ brachioradialis, biceps, triceps bilaterally, reflexes 2+ patella, medial hamstring, achilles bilaterally, negative babinski, negative rodgers\par

## 2021-06-23 NOTE — PHYSICAL THERAPY INITIAL EVALUATION ADULT - LEVEL OF INDEPENDENCE: STAND/SIT, REHAB EVAL
ANTICOAGULATION  MANAGEMENT-Patient Home Monitoring Result    Assessment     Therapeutic INR result of 2.8 (goal INR of 2.0-3.0) received via fax from [employer] home INR monitoring company.        Previous INR was Therapeutic    Reba Calderon last contacted by phone: 1/7/19    Plan     Per home monitoring agreement with patient, patient was NOT contacted regarding therapeutic result today.  Patient is to continue current dose and continue to checking INR with home monitor every 1 week(s) per protocol.  ?   Crys Fontanez RN    Subjective/Objective:      Reba Calderon, a 82 y.o. female  is established on warfarin.     Reba Calderon is using a home INR monitor. Home INR result received via fax today.     Anticoagulation Episode Summary     Current INR goal:   2.0-3.0   TTR:   83.7 % (1.6 y)   Next INR check:   2/4/2019   INR from last check:   2.80 (1/28/2019)   Weekly max warfarin dose:      Target end date:   Indefinite   INR check location:      Preferred lab:      Send INR reminders to:   ANTICOAGULATION POOL C (DTN,VAD,CGR,GAV)    Indications    Atrial fibrillation  permanent (H) [I48.2]           Comments:            Anticoagulation Care Providers     Provider Role Specialty Phone number    Bertin Frances MD Referring Internal Medicine [phone]                 minimum assist (75% patients effort)

## 2021-07-13 ENCOUNTER — APPOINTMENT (OUTPATIENT)
Dept: ORTHOPEDIC SURGERY | Facility: CLINIC | Age: 81
End: 2021-07-13
Payer: MEDICARE

## 2021-07-13 DIAGNOSIS — M19.041 PRIMARY OSTEOARTHRITIS, RIGHT HAND: ICD-10-CM

## 2021-07-13 DIAGNOSIS — M19.042 PRIMARY OSTEOARTHRITIS, LEFT HAND: ICD-10-CM

## 2021-07-13 PROCEDURE — 73130 X-RAY EXAM OF HAND: CPT | Mod: LT

## 2021-07-13 PROCEDURE — 99203 OFFICE O/P NEW LOW 30 MIN: CPT

## 2021-07-14 ENCOUNTER — APPOINTMENT (OUTPATIENT)
Dept: OPHTHALMOLOGY | Facility: CLINIC | Age: 81
End: 2021-07-14

## 2021-07-16 ENCOUNTER — NON-APPOINTMENT (OUTPATIENT)
Age: 81
End: 2021-07-16

## 2021-07-16 ENCOUNTER — APPOINTMENT (OUTPATIENT)
Dept: OPHTHALMOLOGY | Facility: CLINIC | Age: 81
End: 2021-07-16
Payer: MEDICARE

## 2021-07-16 PROCEDURE — 92014 COMPRE OPH EXAM EST PT 1/>: CPT

## 2021-07-26 ENCOUNTER — RX RENEWAL (OUTPATIENT)
Age: 81
End: 2021-07-26

## 2021-07-27 ENCOUNTER — APPOINTMENT (OUTPATIENT)
Dept: PHYSICAL MEDICINE AND REHAB | Facility: CLINIC | Age: 81
End: 2021-07-27
Payer: MEDICARE

## 2021-07-27 PROCEDURE — 99214 OFFICE O/P EST MOD 30 MIN: CPT

## 2021-07-27 NOTE — ASSESSMENT
[FreeTextEntry1] : 82 yo F w/ PMH lumbar fusion who presents w/ low back pain with radiation into the left lower extremity consistent with lumbar radiculopathy secondary to lumbosacral stenosis and lumbar disc herniation.\par \par I had an extended discussion with Ms. Edward on this visit.  Various topics were covered including diagnosis, treatment options, and prognosis. Patient has been assessed recently by Spine Surgery and is deemed not to be a surgical candidate at this time.  Patient continues to be reluctant to try further spinal injections or interventions at this time.  We discussed several PO medication options, acupuncture, CBD oil and further PT/HEP.  Anticipatory guidance provided including instructions on how to manage future flares were discussed.  Red flag signs and symptoms were reviewed and patient instructed to seek immediate medical attention should these arise. Patient is noting improvement in her pain after starting acupuncture, and denies side effects from lyrica., Patient had several questions and all were answered by the conclusion of this visit.\par \par -Continue acupuncture, new referral provided\par -Continue lyrica 50 mg PO qHS prn pain.  Risks and benefits of this medication were reviewed.  Patient denies a hx of CKD and most renal function labs are wnl.  [place] I-stop reviewed and no signs of abuse.   Will monitor and uptitrate as tolerated.\par -RTC 6 weeks\par \par Liam Spicer MD\par Spine and Sports Medicine\par \par [facility] [employer]\par At [facility]\par \par

## 2021-07-27 NOTE — PHYSICAL EXAM
[FreeTextEntry1] : Gen: NAD\par HEENT: neck supple\par CV: no cyanosis\par Pulm: breathing well on room air\par Abd: soft\par Low back: range of motion limited by pain, tenderness to palpation lower lumbar paraspinals, neg seated straight leg raise, further provocative testing was bypassed given patient's discomfort with prone and supine lying, exam similar to previous physical examination.\par Msk: \par 5/5 hip flexion B/L, 5/5 knee extension B/L, 5/5 knee flexion B/L, 5/5 dorsiflexion B/L, 5/5 EHL B/L, 5/5 plantar flexion B/L\par Neuro: sensation intact to light touch in bilateral upper and lower extremities, reflexes 2+ patella, medial hamstring, achilles bilaterally, negative babinski, negative rodgers\par

## 2021-07-27 NOTE — HISTORY OF PRESENT ILLNESS
[FreeTextEntry1] : 80 yo F with PMH L3-L4 laminectomy 1/2020, parkinson's disease, hip AVN who presents with low back pain.\par \par 7/25/21\par Ms. Edward presents for follow up for LBP. Last visit she was provided a referral for acupuncture and rx lyrica 25mg. Lyrica dose was increased to 50mg on 7/14/21. She denies any side effects from Lyrica. She had 3 total sessions of acupuncture. She noted total relief after her 3rd session.  Pain was previously 7-9/10 on her last visit.  Since then, pain is at a 5-6/10.   \par \par Patient denies new weakness, numbness or paresthesia.  Patient denies bowel/bladder dysfunction, fevers, chills, weight loss, night pain, or night sweats.\par \par \par 6/15/21\par Onset: several years but worse over the last year.  No inciting events, trauma, or falls.\par Location: lower lumbar spine\par Characteristics: sharp \par Aggravating factors: prolonged standing, prolonged walking, sit to stand transfers\par Alleviating factors: rest\par Radiation: left lower extremity (left thigh)\par Treatments: tylenol, naproxen, rest, physical therapy, HEP with some relief.  Patient has had lumbar spine injections previously but is not interested in pursuing these interventions at this time.   Patient has tried gabapentin with minimal relief in her pain. \par Severity: 7-9/10\par \par Diagnostic studies:\par MRI lumbar spine 8/2020 showing post laminectomies at L3-L4 and L4-L5 with an increase in anterolisthesis of L3 on L4 with moderate to severe bilateral foraminal narrowing.\par No previous EMG/NCS\par \par Patient denies new weakness, numbness or paresthesia.  Patient denies bowel/bladder dysfunction, fevers, chills, weight loss, night pain, or night sweats.\par

## 2021-08-02 ENCOUNTER — APPOINTMENT (OUTPATIENT)
Dept: INTERNAL MEDICINE | Facility: CLINIC | Age: 81
End: 2021-08-02
Payer: MEDICARE

## 2021-08-02 VITALS
DIASTOLIC BLOOD PRESSURE: 70 MMHG | HEIGHT: 59 IN | HEART RATE: 84 BPM | SYSTOLIC BLOOD PRESSURE: 120 MMHG | OXYGEN SATURATION: 98 % | BODY MASS INDEX: 23.99 KG/M2 | TEMPERATURE: 97.3 F | WEIGHT: 119 LBS

## 2021-08-02 VITALS — HEART RATE: 76 BPM | SYSTOLIC BLOOD PRESSURE: 134 MMHG | DIASTOLIC BLOOD PRESSURE: 80 MMHG

## 2021-08-02 VITALS — DIASTOLIC BLOOD PRESSURE: 80 MMHG | SYSTOLIC BLOOD PRESSURE: 140 MMHG | HEART RATE: 88 BPM

## 2021-08-02 DIAGNOSIS — Z86.69 PERSONAL HISTORY OF OTHER DISEASES OF THE NERVOUS SYSTEM AND SENSE ORGANS: ICD-10-CM

## 2021-08-02 DIAGNOSIS — Z12.39 ENCOUNTER FOR OTHER SCREENING FOR MALIGNANT NEOPLASM OF BREAST: ICD-10-CM

## 2021-08-02 DIAGNOSIS — R53.83 OTHER FATIGUE: ICD-10-CM

## 2021-08-02 PROCEDURE — 36415 COLL VENOUS BLD VENIPUNCTURE: CPT

## 2021-08-02 PROCEDURE — G0444 DEPRESSION SCREEN ANNUAL: CPT | Mod: 59

## 2021-08-02 PROCEDURE — 99214 OFFICE O/P EST MOD 30 MIN: CPT | Mod: 25

## 2021-08-02 RX ORDER — PREGABALIN 25 MG/1
25 CAPSULE ORAL
Qty: 30 | Refills: 0 | Status: DISCONTINUED | COMMUNITY
Start: 2021-06-15 | End: 2021-08-02

## 2021-08-02 NOTE — HISTORY OF PRESENT ILLNESS
[FreeTextEntry1] : Low back pain\par Fatigue\par Episodic dysphagia\par Parkinson's disease\par Hypothyroidism [de-identified] : She is seeing pain management for ongoing pain in her low back radiating down the left leg worse when she is standing and walking and relieved by sitting. She is now on Lyrica 50 milligrams q.h.s. and Tylenol  1000mg and  Naproxen 500mg each morning. She feels the pain as soon as she gets up in the morning she is not aware of weakness or numbness. She can't walk more than 5 minutes ...she sits and the pain is gone. She has had 3 sessions of acupuncture  after the third session felt better for about 5 hours. She is trying to do exercises for low back pain. She fell once in April just tripping and sprained a ligament in her left third finger and this is healing slowly. 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 She complains of episodic dysphagia for solid foods not for liquids. She has no reflux.   She moves her bowels every  other day without any type of laxative. She is on a higher dose carbidopa levodopa

## 2021-08-02 NOTE — PHYSICAL EXAM
[No Acute Distress] : no acute distress [Well Nourished] : well nourished [Well Developed] : well developed [Well-Appearing] : well-appearing [Normal Voice/Communication] : normal voice/communication [Normal Sclera/Conjunctiva] : normal sclera/conjunctiva [PERRL] : pupils equal round and reactive to light [EOMI] : extraocular movements intact [Normal Outer Ear/Nose] : the outer ears and nose were normal in appearance [Normal Oropharynx] : the oropharynx was normal [Normal TMs] : both tympanic membranes were normal [No JVD] : no jugular venous distention [Supple] : supple [No Lymphadenopathy] : no lymphadenopathy [Thyroid Normal, No Nodules] : the thyroid was normal and there were no nodules present [No Respiratory Distress] : no respiratory distress  [Clear to Auscultation] : lungs were clear to auscultation bilaterally [No Accessory Muscle Use] : no accessory muscle use [Normal Percussion] : the chest was normal to percussion [Normal Rate] : normal rate  [Regular Rhythm] : with a regular rhythm [Normal S1, S2] : normal S1 and S2 [No Murmur] : no murmur heard [No Carotid Bruits] : no carotid bruits [No Abdominal Bruit] : a ~M bruit was not heard ~T in the abdomen [No Varicosities] : no varicosities [Pedal Pulses Present] : the pedal pulses are present [No Edema] : there was no peripheral edema [No Extremity Clubbing/Cyanosis] : no extremity clubbing/cyanosis [No Palpable Aorta] : no palpable aorta [Normal Appearance] : normal in appearance [No Nipple Discharge] : no nipple discharge [No Axillary Lymphadenopathy] : no axillary lymphadenopathy [Soft] : abdomen soft [Non Tender] : non-tender [Non-distended] : non-distended [No Masses] : no abdominal mass palpated [No HSM] : no HSM [Normal Bowel Sounds] : normal bowel sounds [Normal Supraclavicular Nodes] : no supraclavicular lymphadenopathy [Normal Axillary Nodes] : no axillary lymphadenopathy [Normal Posterior Cervical Nodes] : no posterior cervical lymphadenopathy [Normal Anterior Cervical Nodes] : no anterior cervical lymphadenopathy [Normal Inguinal Nodes] : no inguinal lymphadenopathy [No CVA Tenderness] : no CVA  tenderness [No Spinal Tenderness] : no spinal tenderness [No Joint Swelling] : no joint swelling [Grossly Normal Strength/Tone] : grossly normal strength/tone [No Rash] : no rash [No Skin Lesions] : no skin lesions [Coordination Grossly Intact] : coordination grossly intact [Deep Tendon Reflexes (DTR)] : deep tendon reflexes were 2+ and symmetric [Speech Grossly Normal] : speech grossly normal [Memory Grossly Normal] : memory grossly normal [Normal Affect] : the affect was normal [Alert and Oriented x3] : oriented to person, place, and time [Normal Mood] : the mood was normal [Normal Insight/Judgement] : insight and judgment were intact [Kyphosis] : no kyphosis [Scoliosis] : no scoliosis [de-identified] : masked facies.  No tremor or rigidity.  Motor5/5 bilaterally.  Walking with a cane. No pain on straight leg raising

## 2021-08-02 NOTE — ASSESSMENT
[FreeTextEntry1] : Her blood pressure is fine and she is minimally orthostatic by pulse. Encouraged increased fluids in her diet. I am not sure the etiology of her fatigue and will do a metabolic workup.Hemoglobin A1c will be checked.  It is possible that it is due to her Parkinson's and inactivity. She denies that she is depressed. She will followup with pain management regarding her left lumbar radiculopathy. We discussed that on days  that she knows she will be very active she could take naproxen b.i.d.\par We discussed her episodic dysphagia for solid foods and going back to see GI or perhaps an upper endoscopy but she refuses to consider this at the present time and wants to rediscuss this  at her next visit in November for her annual wellness visit\par She requests a referral for her yearly mammogram and breast sonogram and this was given

## 2021-08-08 LAB
25(OH)D3 SERPL-MCNC: 32.3 NG/ML
ALBUMIN SERPL ELPH-MCNC: 4.3 G/DL
ALP BLD-CCNC: 65 U/L
ALT SERPL-CCNC: 9 U/L
ANION GAP SERPL CALC-SCNC: 13 MMOL/L
AST SERPL-CCNC: 30 U/L
BASOPHILS # BLD AUTO: 0.1 K/UL
BASOPHILS NFR BLD AUTO: 0.9 %
BILIRUB SERPL-MCNC: 0.3 MG/DL
BUN SERPL-MCNC: 26 MG/DL
CALCIUM SERPL-MCNC: 9.8 MG/DL
CHLORIDE SERPL-SCNC: 101 MMOL/L
CO2 SERPL-SCNC: 25 MMOL/L
CREAT SERPL-MCNC: 0.7 MG/DL
EOSINOPHIL # BLD AUTO: 0.18 K/UL
EOSINOPHIL NFR BLD AUTO: 1.6 %
ESTIMATED AVERAGE GLUCOSE: 128 MG/DL
GLUCOSE SERPL-MCNC: 101 MG/DL
HBA1C MFR BLD HPLC: 6.1 %
HCT VFR BLD CALC: 40.3 %
HGB BLD-MCNC: 12.7 G/DL
IMM GRANULOCYTES NFR BLD AUTO: 0.4 %
LYMPHOCYTES # BLD AUTO: 1.58 K/UL
LYMPHOCYTES NFR BLD AUTO: 14.4 %
MAGNESIUM SERPL-MCNC: 1.8 MG/DL
MAN DIFF?: NORMAL
MCHC RBC-ENTMCNC: 29.3 PG
MCHC RBC-ENTMCNC: 31.5 GM/DL
MCV RBC AUTO: 93.1 FL
MONOCYTES # BLD AUTO: 0.96 K/UL
MONOCYTES NFR BLD AUTO: 8.7 %
NEUTROPHILS # BLD AUTO: 8.14 K/UL
NEUTROPHILS NFR BLD AUTO: 74 %
PLATELET # BLD AUTO: 293 K/UL
POTASSIUM SERPL-SCNC: 3.9 MMOL/L
PROT SERPL-MCNC: 6.5 G/DL
RBC # BLD: 4.33 M/UL
RBC # FLD: 14.3 %
SODIUM SERPL-SCNC: 139 MMOL/L
T4 FREE SERPL-MCNC: 1.7 NG/DL
TSH SERPL-ACNC: 0.32 UIU/ML
VIT B12 SERPL-MCNC: 641 PG/ML
WBC # FLD AUTO: 11 K/UL

## 2021-08-28 ENCOUNTER — RX RENEWAL (OUTPATIENT)
Age: 81
End: 2021-08-28

## 2021-09-02 ENCOUNTER — RESULT REVIEW (OUTPATIENT)
Age: 81
End: 2021-09-02

## 2021-09-02 ENCOUNTER — APPOINTMENT (OUTPATIENT)
Dept: MAMMOGRAPHY | Facility: CLINIC | Age: 81
End: 2021-09-02
Payer: MEDICARE

## 2021-09-02 ENCOUNTER — APPOINTMENT (OUTPATIENT)
Dept: ULTRASOUND IMAGING | Facility: CLINIC | Age: 81
End: 2021-09-02
Payer: MEDICARE

## 2021-09-02 PROCEDURE — 76641 ULTRASOUND BREAST COMPLETE: CPT | Mod: 50

## 2021-09-02 PROCEDURE — 77067 SCR MAMMO BI INCL CAD: CPT

## 2021-09-02 PROCEDURE — 77063 BREAST TOMOSYNTHESIS BI: CPT

## 2021-09-07 ENCOUNTER — APPOINTMENT (OUTPATIENT)
Dept: PHYSICAL MEDICINE AND REHAB | Facility: CLINIC | Age: 81
End: 2021-09-07

## 2021-09-13 ENCOUNTER — APPOINTMENT (OUTPATIENT)
Dept: PHYSICAL MEDICINE AND REHAB | Facility: CLINIC | Age: 81
End: 2021-09-13
Payer: MEDICARE

## 2021-09-13 DIAGNOSIS — Z98.890 OTHER SPECIFIED POSTPROCEDURAL STATES: ICD-10-CM

## 2021-09-13 PROCEDURE — 99214 OFFICE O/P EST MOD 30 MIN: CPT

## 2021-09-14 ENCOUNTER — TRANSCRIPTION ENCOUNTER (OUTPATIENT)
Age: 81
End: 2021-09-14

## 2021-09-15 ENCOUNTER — NON-APPOINTMENT (OUTPATIENT)
Age: 81
End: 2021-09-15

## 2021-09-18 PROBLEM — Z98.890 S/P LUMBAR LAMINECTOMY: Status: ACTIVE | Noted: 2019-05-07

## 2021-09-18 NOTE — HISTORY OF PRESENT ILLNESS
[FreeTextEntry1] : 80 yo F with PMH L3-L4 laminectomy 1/2020, parkinson's disease, hip AVN who presents with low back pain.\par \par 9/13/21\par 80 yo F who presents for follow up with low back pain.  Patient reports that low back pain is about the same as previously.  Patient is taking lyrica 50 mg qHS with no appreciable side effects but no improvement.  Patient reports  that pain is about the same as previously.  Patient has taken naproxen and tylenol but without improvement in her pain.  Patient reports that pain is severe but she is not interested in pursuing spinal interventions at this time.  Patient has been participating with acupuncture without benefit.  Patient had participated in PT/HEP with some improvement in the past.  Patient has been participating in water aerobics with some improvement.  Patient denies new weakness, numbness or paresthesia.  Denies bowel/bladder dysfunction, saddle anesthesia, fevers, chills, weight loss, night pain, or night sweats.\par \par 7/25/21\par Ms. Edward presents for follow up for LBP. Last visit she was provided a referral for acupuncture and rx lyrica 25mg. Lyrica dose was increased to 50mg on 7/14/21. She denies any side effects from Lyrica. She had 3 total sessions of acupuncture. She noted total relief after her 3rd session.  Pain was previously 7-9/10 on her last visit.  Since then, pain is at a 5-6/10.   \par \par Patient denies new weakness, numbness or paresthesia.  Patient denies bowel/bladder dysfunction, fevers, chills, weight loss, night pain, or night sweats.\par \par \par 6/15/21\par Onset: several years but worse over the last year.  No inciting events, trauma, or falls.\par Location: lower lumbar spine\par Characteristics: sharp \par Aggravating factors: prolonged standing, prolonged walking, sit to stand transfers\par Alleviating factors: rest\par Radiation: left lower extremity (left thigh)\par Treatments: tylenol, naproxen, rest, physical therapy, HEP with some relief.  Patient has had lumbar spine injections previously but is not interested in pursuing these interventions at this time.   Patient has tried gabapentin with minimal relief in her pain. \par Severity: 7-9/10\par \par Diagnostic studies:\par MRI lumbar spine 8/2020 showing post laminectomies at L3-L4 and L4-L5 with an increase in anterolisthesis of L3 on L4 with moderate to severe bilateral foraminal narrowing.\par No previous EMG/NCS\par \par Patient denies new weakness, numbness or paresthesia.  Patient denies bowel/bladder dysfunction, fevers, chills, weight loss, night pain, or night sweats.\par

## 2021-09-18 NOTE — ASSESSMENT
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 Edward on this visit.  Various topics were covered including diagnosis, treatment options, and prognosis. Patient has been assessed recently by Spine Surgery and is deemed not to be a surgical candidate at this time.  Patient continues to be reluctant to try further spinal injections or interventions at this time.  We discussed several PO medication options, acupuncture, and further PT/HEP.   Red flag signs and symptoms were reviewed and patient instructed to seek immediate medical attention should these arise. Patient reports pain has not improved despite completing a course of acupuncture but had improved with water aerobics.  Will restart PT and asked to consider aqua therapy.  Patient denies side effects from lyrica and will increase this dose cautiously. Patient had several questions and all were answered by the conclusion of this visit.\par \par -Start PT/HEP, consider aqua therapy.\par -Increase lyrica 50 mg PO qHS to 25 mg PO AM and 50 mg PO qHS.  No side effects from previous dose.  Risks and benefits of this medication were reviewed.  Patient denies a hx of CKD and most renal function labs are wnl.  [place] I-stop reviewed and no signs of abuse.   Will monitor and uptitrate as tolerated.\par -RTC 6 weeks\par \par Liam Spicer MD\par Spine and Sports Medicine\par \par [employer]\par At [facility]\par \par

## 2021-09-18 NOTE — PHYSICAL EXAM
[FreeTextEntry1] : Gen: NAD\par HEENT: neck supple\par CV: no cyanosis\par Pulm: breathing well on room air\par Abd: soft\par Low back: range of motion limited by pain, tenderness to palpation lower lumbar paraspinals and bilateral sciatic notches, neg seated straight leg raise, further provocative testing was bypassed given patient's discomfort with prone and supine lying, exam similar to previous physical examination.\par Msk: \par 5/5 hip flexion B/L, 5/5 knee extension B/L, 5/5 knee flexion B/L, 5/5 dorsiflexion B/L, 5/5 EHL B/L, 5/5 plantar flexion B/L\par Neuro: sensation intact to light touch in bilateral upper and lower extremities, reflexes 2+ patella, medial hamstring, achilles bilaterally, negative babinski, negative rodgers\par

## 2021-10-18 ENCOUNTER — APPOINTMENT (OUTPATIENT)
Dept: PHYSICAL MEDICINE AND REHAB | Facility: CLINIC | Age: 81
End: 2021-10-18
Payer: MEDICARE

## 2021-10-18 DIAGNOSIS — M48.07 SPINAL STENOSIS, LUMBOSACRAL REGION: ICD-10-CM

## 2021-10-18 DIAGNOSIS — M51.36 OTHER INTERVERTEBRAL DISC DEGENERATION, LUMBAR REGION: ICD-10-CM

## 2021-10-18 PROCEDURE — 99214 OFFICE O/P EST MOD 30 MIN: CPT

## 2021-10-19 PROBLEM — M51.36 DISC DEGENERATION, LUMBAR: Status: ACTIVE | Noted: 2020-09-22

## 2021-10-19 PROBLEM — M48.07 LUMBOSACRAL SPINAL STENOSIS: Status: ACTIVE | Noted: 2019-05-30

## 2021-10-19 NOTE — ASSESSMENT
[FreeTextEntry1] : 80 yo F w/ PMH lumbar fusion who presents w/ low back pain with radiation into the left lower extremity consistent with lumbar radiculopathy secondary to lumbosacral stenosis and lumbar disc herniation.\par \par Patient has been assessed recently by Spine Surgery and is deemed not to be a surgical candidate at this time.  Patient continues to be reluctant to try further spinal injections or interventions at this time.  \par \par Patient exhibits new left sided weakness.  Otherwise, neurologically intact with a normal gait.  Will order repeat MRI lumbar spine w/o contrast to evaluate.\par \par We discussed several PO medication options, acupuncture, and further PT/HEP.   Red flag signs and symptoms were reviewed and patient instructed to seek immediate medical attention should these arise. Patient reports pain has not improved despite completing a course of acupuncture and continued use of lyrica.  Patient denies side effects from lyrica and will increase this dose cautiously. \par \par -Start PT/HEP, consider aqua therapy.\par -MRI lumbar spine w/o contrast ordered.\par -Increase lyrica 25 mg - 50 mg PO to 50 mg PO BID.  No side effects from previous dose.  Risks and benefits of this medication were reviewed.  Patient denies a hx of CKD and most renal function labs are wnl.  [place] I-stop reviewed and no signs of abuse.   Will monitor and uptitrate as tolerated.\par -RTC following MRI to review results\par \par Liam Spicer MD\par Spine and Sports Medicine\par \par [employer]\par At [facility]\par \par

## 2021-10-19 NOTE — HISTORY OF PRESENT ILLNESS
[FreeTextEntry1] : 82 yo F with PMH L3-L4 laminectomy 1/2020, parkinson's disease, hip AVN who presents with low back pain.\par \par 10/18/21\par 82 yo F who presents for follow up with low back pain.  Patient reports that low back pain has since progressed.  Patient reports that she is having worsening pain in bilateral posterior thighs.  No side effects or benefits from lyrica 25 mg - 50 mg PO.  Patient reports right hip is becoming weaker.  Patient denies new weakness, numbness or paresthesia.  Denies bowel/bladder dysfunction, saddle anesthesia, fevers, chills, weight loss, night pain, or night sweats.\par \par 9/13/21\par 82 yo F who presents for follow up with low back pain.  Patient reports that low back pain is about the same as previously.  Patient is taking lyrica 50 mg qHS with no appreciable side effects but no improvement.  Patient reports  that pain is about the same as previously.  Patient has taken naproxen and tylenol but without improvement in her pain.  Patient reports that pain is severe but she is not interested in pursuing spinal interventions at this time.  Patient has been participating with acupuncture without benefit.  Patient had participated in PT/HEP with some improvement in the past.  Patient has been participating in water aerobics with some improvement.  Patient denies new weakness, numbness or paresthesia.  Denies bowel/bladder dysfunction, saddle anesthesia, fevers, chills, weight loss, night pain, or night sweats.\par \par 7/25/21\par Ms. Edward presents for follow up for LBP. Last visit she was provided a referral for acupuncture and rx lyrica 25mg. Lyrica dose was increased to 50mg on 7/14/21. She denies any side effects from Lyrica. She had 3 total sessions of acupuncture. She noted total relief after her 3rd session.  Pain was previously 7-9/10 on her last visit.  Since then, pain is at a 5-6/10.   \par \par Patient denies new weakness, numbness or paresthesia.  Patient denies bowel/bladder dysfunction, fevers, chills, weight loss, night pain, or night sweats.\par \par \par 6/15/21\par Onset: several years but worse over the last year.  No inciting events, trauma, or falls.\par Location: lower lumbar spine\par Characteristics: sharp \par Aggravating factors: prolonged standing, prolonged walking, sit to stand transfers\par Alleviating factors: rest\par Radiation: left lower extremity (left thigh)\par Treatments: tylenol, naproxen, rest, physical therapy, HEP with some relief.  Patient has had lumbar spine injections previously but is not interested in pursuing these interventions at this time.   Patient has tried gabapentin with minimal relief in her pain. \par Severity: 7-9/10\par \par Diagnostic studies:\par MRI lumbar spine 8/2020 showing post laminectomies at L3-L4 and L4-L5 with an increase in anterolisthesis of L3 on L4 with moderate to severe bilateral foraminal narrowing.\par No previous EMG/NCS\par \par Patient denies new weakness, numbness or paresthesia.  Patient denies bowel/bladder dysfunction, fevers, chills, weight loss, night pain, or night sweats.\par

## 2021-10-19 NOTE — PHYSICAL EXAM
[FreeTextEntry1] : Gen: NAD\par HEENT: neck supple\par CV: no cyanosis\par Pulm: breathing well on room air\par Abd: soft\par Low back: range of motion limited by pain, tenderness to palpation lower lumbar paraspinals and bilateral sciatic notches, neg seated straight leg raise, neg FABERE, neg FAIR\par Msk: \par 4/5 L, 5/5 R hip flexion, 5/5 knee extension B/L, 5/5 knee flexion B/L, 5/5 dorsiflexion B/L, 5/5 EHL B/L, 5/5 plantar flexion B/L\par Neuro: sensation intact to light touch in bilateral upper and lower extremities, reflexes 2+ patella, medial hamstring, achilles bilaterally, negative babinski, negative rodgers\par

## 2021-10-21 ENCOUNTER — NON-APPOINTMENT (OUTPATIENT)
Age: 81
End: 2021-10-21

## 2021-11-03 ENCOUNTER — RX RENEWAL (OUTPATIENT)
Age: 81
End: 2021-11-03

## 2021-11-14 PROBLEM — Z79.899 CURRENT USE OF PROTON PUMP INHIBITOR: Status: ACTIVE | Noted: 2020-09-03

## 2021-11-14 PROBLEM — R73.9 BLOOD GLUCOSE ELEVATED: Status: ACTIVE | Noted: 2018-05-20

## 2021-11-14 RX ORDER — OMEPRAZOLE MAGNESIUM 20 MG/1
20 TABLET, DELAYED RELEASE ORAL
Refills: 0 | Status: DISCONTINUED | COMMUNITY
End: 2021-11-14

## 2021-11-15 ENCOUNTER — NON-APPOINTMENT (OUTPATIENT)
Age: 81
End: 2021-11-15

## 2021-11-15 ENCOUNTER — APPOINTMENT (OUTPATIENT)
Dept: INTERNAL MEDICINE | Facility: CLINIC | Age: 81
End: 2021-11-15
Payer: MEDICARE

## 2021-11-15 VITALS — DIASTOLIC BLOOD PRESSURE: 80 MMHG | HEART RATE: 72 BPM | SYSTOLIC BLOOD PRESSURE: 136 MMHG

## 2021-11-15 VITALS
DIASTOLIC BLOOD PRESSURE: 76 MMHG | TEMPERATURE: 97.88 F | SYSTOLIC BLOOD PRESSURE: 144 MMHG | BODY MASS INDEX: 24.8 KG/M2 | OXYGEN SATURATION: 98 % | HEART RATE: 76 BPM | HEIGHT: 59 IN | WEIGHT: 123 LBS

## 2021-11-15 VITALS — SYSTOLIC BLOOD PRESSURE: 140 MMHG | HEART RATE: 80 BPM | DIASTOLIC BLOOD PRESSURE: 80 MMHG

## 2021-11-15 VITALS — HEART RATE: 70 BPM | SYSTOLIC BLOOD PRESSURE: 140 MMHG | DIASTOLIC BLOOD PRESSURE: 80 MMHG

## 2021-11-15 DIAGNOSIS — R55 SYNCOPE AND COLLAPSE: ICD-10-CM

## 2021-11-15 DIAGNOSIS — K59.09 OTHER CONSTIPATION: ICD-10-CM

## 2021-11-15 DIAGNOSIS — Z79.899 OTHER LONG TERM (CURRENT) DRUG THERAPY: ICD-10-CM

## 2021-11-15 DIAGNOSIS — Z79.1 LONG TERM (CURRENT) USE OF NON-STEROIDAL ANTI-INFLAMMATORIES (NSAID): ICD-10-CM

## 2021-11-15 DIAGNOSIS — Z00.00 ENCOUNTER FOR GENERAL ADULT MEDICAL EXAMINATION W/OUT ABNORMAL FINDINGS: ICD-10-CM

## 2021-11-15 DIAGNOSIS — S63.653D SPRAIN OF METACARPOPHALANGEAL JOINT OF LEFT MIDDLE FINGER, SUBSEQUENT ENCOUNTER: ICD-10-CM

## 2021-11-15 DIAGNOSIS — R73.9 HYPERGLYCEMIA, UNSPECIFIED: ICD-10-CM

## 2021-11-15 DIAGNOSIS — M87.051 IDIOPATHIC ASEPTIC NECROSIS OF RIGHT FEMUR: ICD-10-CM

## 2021-11-15 PROCEDURE — G0444 DEPRESSION SCREEN ANNUAL: CPT | Mod: 59

## 2021-11-15 PROCEDURE — G0439: CPT

## 2021-11-15 PROCEDURE — 93000 ELECTROCARDIOGRAM COMPLETE: CPT | Mod: 59

## 2021-11-15 PROCEDURE — 99214 OFFICE O/P EST MOD 30 MIN: CPT | Mod: 25

## 2021-11-15 PROCEDURE — 36415 COLL VENOUS BLD VENIPUNCTURE: CPT

## 2021-11-15 RX ORDER — NAPROXEN 500 MG/1
500 TABLET ORAL
Qty: 60 | Refills: 2 | Status: DISCONTINUED | COMMUNITY
Start: 2021-03-12 | End: 2021-11-15

## 2021-11-15 RX ORDER — PREGABALIN 25 MG/1
25 CAPSULE ORAL
Qty: 30 | Refills: 0 | Status: DISCONTINUED | COMMUNITY
Start: 2021-09-13 | End: 2021-11-15

## 2021-11-16 PROBLEM — S63.653D: Status: RESOLVED | Noted: 2021-07-13 | Resolved: 2021-11-16

## 2021-11-16 PROBLEM — Z79.1 NSAID LONG-TERM USE: Status: RESOLVED | Noted: 2018-11-15 | Resolved: 2021-11-16

## 2021-11-16 LAB
25(OH)D3 SERPL-MCNC: 28.1 NG/ML
ALBUMIN SERPL ELPH-MCNC: 4.5 G/DL
ALP BLD-CCNC: 75 U/L
ALT SERPL-CCNC: 7 U/L
ANION GAP SERPL CALC-SCNC: 15 MMOL/L
APPEARANCE: CLEAR
AST SERPL-CCNC: 29 U/L
BACTERIA: ABNORMAL
BASOPHILS # BLD AUTO: 0.08 K/UL
BASOPHILS NFR BLD AUTO: 1.1 %
BILIRUB SERPL-MCNC: 0.5 MG/DL
BILIRUBIN URINE: NEGATIVE
BLOOD URINE: NEGATIVE
BUN SERPL-MCNC: 21 MG/DL
CALCIUM SERPL-MCNC: 9.6 MG/DL
CHLORIDE SERPL-SCNC: 100 MMOL/L
CHOLEST SERPL-MCNC: 213 MG/DL
CO2 SERPL-SCNC: 24 MMOL/L
COLOR: YELLOW
CREAT SERPL-MCNC: 0.6 MG/DL
EOSINOPHIL # BLD AUTO: 0.12 K/UL
EOSINOPHIL NFR BLD AUTO: 1.6 %
ESTIMATED AVERAGE GLUCOSE: 120 MG/DL
GLUCOSE QUALITATIVE U: NEGATIVE
GLUCOSE SERPL-MCNC: 81 MG/DL
HBA1C MFR BLD HPLC: 5.8 %
HCT VFR BLD CALC: 42.1 %
HDLC SERPL-MCNC: 94 MG/DL
HGB BLD-MCNC: 13 G/DL
HYALINE CASTS: 0 /LPF
IMM GRANULOCYTES NFR BLD AUTO: 0.1 %
KETONES URINE: NEGATIVE
LDLC SERPL CALC-MCNC: 94 MG/DL
LEUKOCYTE ESTERASE URINE: ABNORMAL
LYMPHOCYTES # BLD AUTO: 2.05 K/UL
LYMPHOCYTES NFR BLD AUTO: 27.4 %
MAGNESIUM SERPL-MCNC: 1.9 MG/DL
MAN DIFF?: NORMAL
MCHC RBC-ENTMCNC: 28.4 PG
MCHC RBC-ENTMCNC: 30.9 GM/DL
MCV RBC AUTO: 91.9 FL
MICROSCOPIC-UA: NORMAL
MONOCYTES # BLD AUTO: 0.66 K/UL
MONOCYTES NFR BLD AUTO: 8.8 %
NEUTROPHILS # BLD AUTO: 4.55 K/UL
NEUTROPHILS NFR BLD AUTO: 61 %
NITRITE URINE: NEGATIVE
NONHDLC SERPL-MCNC: 118 MG/DL
PH URINE: 6.5
PLATELET # BLD AUTO: 293 K/UL
POTASSIUM SERPL-SCNC: 3.8 MMOL/L
PROT SERPL-MCNC: 7.1 G/DL
PROTEIN URINE: ABNORMAL
RBC # BLD: 4.58 M/UL
RBC # FLD: 13.9 %
RED BLOOD CELLS URINE: 2 /HPF
SODIUM SERPL-SCNC: 139 MMOL/L
SPECIFIC GRAVITY URINE: 1.02
SQUAMOUS EPITHELIAL CELLS: 4 /HPF
TRIGL SERPL-MCNC: 123 MG/DL
TSH SERPL-ACNC: 3.32 UIU/ML
UROBILINOGEN URINE: NORMAL
VIT B12 SERPL-MCNC: 685 PG/ML
WBC # FLD AUTO: 7.47 K/UL
WHITE BLOOD CELLS URINE: 23 /HPF

## 2021-11-16 RX ORDER — MULTIVIT-MIN/FOLIC/VIT K/LYCOP 400-300MCG
25 MCG TABLET ORAL DAILY
Qty: 90 | Refills: 0 | Status: ACTIVE | COMMUNITY
Start: 2021-11-16

## 2021-11-16 NOTE — HISTORY OF PRESENT ILLNESS
[FreeTextEntry1] : Annual wellness visit\par Hyperlipidemia\par Hypothyroidism\par Parkinsons\par Low back pain\par Near syncope [de-identified] : She is having ongoing issues with low back pain radiating down her right lateral thigh. When she is sitting she doesn't have much pain but standing and lying is an issue. She is walking with a cane. She is now on Lyrica 50 mg b.i.d. and just uses occasional Tylenol.She had an episode a month or so ago where she felt dizzy which sounds like near syncope not vertigo and fell to the ground. There was no loss of consciousness and has not recurred. She tries to stay hydrated.   She has no chest pain shortness of breath or palpitations. She saw the dermatologist Dr. Liu  and all was fine.   She is happy with her vision post cataracts.She feels her Parkinson's is stable. She postponed her visit to see neurology in [place] due to her back pain. She states she had an issue where she aspirated saliva and felt short of breath .....this happened twice She is moving her bowels well. She remains independent with all activities  of daily living and drove here today. She canceled a trip to [facility] to see her family due to her back pain. She gets up at  night to urinate and can't to go back to sleep as she has back pain. She got herself a pet cat

## 2021-11-16 NOTE — PHYSICAL EXAM
[No Acute Distress] : no acute distress [Well Nourished] : well nourished [Well Developed] : well developed [Well-Appearing] : well-appearing [Normal Voice/Communication] : normal voice/communication [Normal Sclera/Conjunctiva] : normal sclera/conjunctiva [PERRL] : pupils equal round and reactive to light [EOMI] : extraocular movements intact [Normal Outer Ear/Nose] : the outer ears and nose were normal in appearance [Normal Oropharynx] : the oropharynx was normal [Normal TMs] : both tympanic membranes were normal [No JVD] : no jugular venous distention [Supple] : supple [No Lymphadenopathy] : no lymphadenopathy [Thyroid Normal, No Nodules] : the thyroid was normal and there were no nodules present [No Respiratory Distress] : no respiratory distress  [Clear to Auscultation] : lungs were clear to auscultation bilaterally [No Accessory Muscle Use] : no accessory muscle use [Normal Percussion] : the chest was normal to percussion [Normal Rate] : normal rate  [Regular Rhythm] : with a regular rhythm [Normal S1, S2] : normal S1 and S2 [No Murmur] : no murmur heard [No Carotid Bruits] : no carotid bruits [No Abdominal Bruit] : a ~M bruit was not heard ~T in the abdomen [No Varicosities] : no varicosities [Pedal Pulses Present] : the pedal pulses are present [No Edema] : there was no peripheral edema [No Extremity Clubbing/Cyanosis] : no extremity clubbing/cyanosis [No Palpable Aorta] : no palpable aorta [Normal Appearance] : normal in appearance [No Nipple Discharge] : no nipple discharge [No Axillary Lymphadenopathy] : no axillary lymphadenopathy [Soft] : abdomen soft [Non Tender] : non-tender [Non-distended] : non-distended [No Masses] : no abdominal mass palpated [No HSM] : no HSM [Normal Bowel Sounds] : normal bowel sounds [Normal Supraclavicular Nodes] : no supraclavicular lymphadenopathy [Normal Axillary Nodes] : no axillary lymphadenopathy [Normal Posterior Cervical Nodes] : no posterior cervical lymphadenopathy [Normal Anterior Cervical Nodes] : no anterior cervical lymphadenopathy [Normal Inguinal Nodes] : no inguinal lymphadenopathy [No CVA Tenderness] : no CVA  tenderness [No Spinal Tenderness] : no spinal tenderness [No Joint Swelling] : no joint swelling [Grossly Normal Strength/Tone] : grossly normal strength/tone [Coordination Grossly Intact] : coordination grossly intact [Deep Tendon Reflexes (DTR)] : deep tendon reflexes were 2+ and symmetric [Speech Grossly Normal] : speech grossly normal [Memory Grossly Normal] : memory grossly normal [Normal Affect] : the affect was normal [Alert and Oriented x3] : oriented to person, place, and time [Normal Mood] : the mood was normal [Normal Insight/Judgement] : insight and judgment were intact [Kyphosis] : no kyphosis [Scoliosis] : no scoliosis [de-identified] : Multiple seborrheic keratoses and cherry angiomas. There is tinea cruris underneath the left breast [de-identified] : masked facies.  No tremor or rigidity.  Motor5/5 bilaterally.  Walking with a cane

## 2021-11-16 NOTE — ASSESSMENT
[FreeTextEntry1] : She'll followup with pain management and spine ortho regarding her ongoing low back pain and right radicular pain. She will be going for repeat MRI of the lumbar sacral spine.\par I am not sure the cause of her episode of dizziness several months ago. She is not orthostatic. Her EKG is fine. She was advised to maintain hydration and if  she should get recurrent dizziness she will probably need cardiac monitoring\par She was given miconazole nitrate cream to use twice a day for her tinea under her breasts and once the rash is resolved can  use OTC  antifungal powder\par She is completing 5 years of alendronate and will followup with endocrinology for repeat bone density and advice.\par We discussed going for modified barium swallow for her episodic dysphagia and aspiration but she refuses.          She will followup with neurology for her Parkinson's which  appears stable\par Blood work was sent for evaluations of her lipids chemistries thyroid functions\par She is up-to-date with all vaccinations\par Advanced directives were reviewed and the patient has them in place

## 2021-11-16 NOTE — HEALTH RISK ASSESSMENT
[No] : In the past 12 months have you used drugs other than those required for medical reasons? No [One fall no injury in past year] : Patient reported one fall in the past year without injury [0] : 2) Feeling down, depressed, or hopeless: Not at all (0) [Patient reported PAP Smear was normal] : Patient reported PAP Smear was normal [None] : None [Alone] : lives alone [Retired] : retired [College] : College [] :  [Feels Safe at Home] : Feels safe at home [Fully functional (bathing, dressing, toileting, transferring, walking, feeding)] : Fully functional (bathing, dressing, toileting, transferring, walking, feeding) [Fully functional (using the telephone, shopping, preparing meals, housekeeping, doing laundry, using] : Fully functional and needs no help or supervision to perform IADLs (using the telephone, shopping, preparing meals, housekeeping, doing laundry, using transportation, managing medications and managing finances) [Reports changes in vision] : Reports changes in vision [Smoke Detector] : smoke detector [Carbon Monoxide Detector] : carbon monoxide detector [Seat Belt] :  uses seat belt [Sunscreen] : uses sunscreen [PHQ-2 Negative - No further assessment needed] : PHQ-2 Negative - No further assessment needed [Yes] : Yes [4 or more  times a week (4 pts)] : 4 or more  times a week (4 points) [Reviewed no changes] : Reviewed, no changes [Designated Healthcare Proxy] : Designated healthcare proxy [Name: ___] : Health Care Proxy's Name: [unfilled]  [Relationship: ___] : Relationship: [unfilled] [Assistive Device] : Patient uses an assistive device [] : No [de-identified] : white wine with dinner [de-identified] : none [de-identified] : tries to eat healthy [de-identified] : cane.  Refused get up and go test [Change in mental status noted] : No change in mental status noted [Language] : denies difficulty with language [Behavior] : denies difficulty with behavior [Learning/Retaining New Information] : denies difficulty learning/retaining new information [Handling Complex Tasks] : denies difficulty handling complex tasks [Reasoning] : denies difficulty with reasoning [Spatial Ability and Orientation] : denies difficulty with spatial ability and orientation [Sexually Active] : not sexually active [High Risk Behavior] : no high risk behavior [Reports changes in hearing] : Reports no changes in hearing [Reports normal functional visual acuity (ie: able to read med bottle)] : Reports poor functional visual acuity.  [Reports changes in dental health] : Reports no changes in dental health [Guns at Home] : no guns at home [Travel to Developing Areas] : does not  travel to developing areas [MammogramDate] : 9/2021 [MammogramComments] : birads 2 [PapSmearDate] : 2017 [BoneDensityDate] : 10/2020 [BoneDensityComments] : stable osteopenia [ColonoscopyDate] : 6/2015 [ColonoscopyComments] : no polyps [de-identified] : happy post cataracts [AdvancecareDate] : 11/2021

## 2021-11-17 ENCOUNTER — APPOINTMENT (OUTPATIENT)
Dept: MRI IMAGING | Facility: CLINIC | Age: 81
End: 2021-11-17
Payer: MEDICARE

## 2021-11-17 ENCOUNTER — RESULT REVIEW (OUTPATIENT)
Age: 81
End: 2021-11-17

## 2021-11-17 ENCOUNTER — OUTPATIENT (OUTPATIENT)
Dept: OUTPATIENT SERVICES | Facility: HOSPITAL | Age: 81
LOS: 1 days | End: 2021-11-17
Payer: MEDICARE

## 2021-11-17 DIAGNOSIS — Z98.890 OTHER SPECIFIED POSTPROCEDURAL STATES: Chronic | ICD-10-CM

## 2021-11-17 DIAGNOSIS — M54.16 RADICULOPATHY, LUMBAR REGION: ICD-10-CM

## 2021-11-17 DIAGNOSIS — E89.0 POSTPROCEDURAL HYPOTHYROIDISM: Chronic | ICD-10-CM

## 2021-11-17 DIAGNOSIS — Z96.642 PRESENCE OF LEFT ARTIFICIAL HIP JOINT: Chronic | ICD-10-CM

## 2021-11-17 DIAGNOSIS — Z90.89 ACQUIRED ABSENCE OF OTHER ORGANS: Chronic | ICD-10-CM

## 2021-11-17 PROCEDURE — G1004: CPT

## 2021-11-17 PROCEDURE — 72158 MRI LUMBAR SPINE W/O & W/DYE: CPT | Mod: 26,ME

## 2021-11-17 PROCEDURE — A9585: CPT

## 2021-11-17 PROCEDURE — 72158 MRI LUMBAR SPINE W/O & W/DYE: CPT | Mod: ME

## 2021-11-22 ENCOUNTER — APPOINTMENT (OUTPATIENT)
Dept: PHYSICAL MEDICINE AND REHAB | Facility: CLINIC | Age: 81
End: 2021-11-22
Payer: MEDICARE

## 2021-11-22 DIAGNOSIS — M54.50 LOW BACK PAIN, UNSPECIFIED: ICD-10-CM

## 2021-11-22 DIAGNOSIS — M51.26 OTHER INTERVERTEBRAL DISC DISPLACEMENT, LUMBAR REGION: ICD-10-CM

## 2021-11-22 PROCEDURE — 99214 OFFICE O/P EST MOD 30 MIN: CPT

## 2021-11-23 ENCOUNTER — TRANSCRIPTION ENCOUNTER (OUTPATIENT)
Age: 81
End: 2021-11-23

## 2021-11-25 PROBLEM — M54.50 LUMBAGO: Status: ACTIVE | Noted: 2020-05-08

## 2021-11-25 PROBLEM — M51.26 LUMBAR HERNIATED DISC: Status: ACTIVE | Noted: 2019-12-10

## 2021-11-25 NOTE — PHYSICAL EXAM
[FreeTextEntry1] : Gen: NAD\par HEENT: neck supple\par CV: no cyanosis\par Pulm: breathing well on room air\par Abd: soft\par \par Low back: range of motion limited by pain, tenderness to palpation lower lumbar paraspinals and bilateral sciatic notches, neg seated straight leg raise, neg FABERE, neg FAIR\par \par Msk: \par 5/5 hip flexion B/L, 5/5 knee extension B/L, 5/5 knee flexion B/L, 5/5 dorsiflexion B/L, 5/5 EHL B/L, 5/5 plantar flexion B/L\par 5/5 shoulder abduction B/L, 5/5 elbow flexion B/L, 5/5 elbow extension B/L, 5/5 wrist extension B/L, 5/5 hand  B/L\par \par \par \par Neuro: sensation intact to light touch in bilateral upper and lower extremities, reflexes 2+ patella, medial hamstring, achilles bilaterally, negative babinski, negative rodgers\par

## 2021-11-25 NOTE — ASSESSMENT
[FreeTextEntry1] : 80 yo F w/ PMH lumbar fusion who presents w/ low back pain with radiation into the right lower extremity consistent with lumbar radiculopathy secondary to lumbosacral stenosis and lumbar disc herniation.\par \par Patient has been assessed recently by Spine Surgery and is deemed not to be a surgical candidate at this time.  Patient continues to be reluctant to try further spinal injections or interventions at this time due to concern for joint AVN from steroids. Despite increased pain and decreased function she is otherwise neurologically intact with a normal gait. \par \par We discussed several PO medication options, acupuncture, and further PT/HEP.   Red flag signs and symptoms were reviewed and patient instructed to seek immediate medical attention should these arise. Patient reports pain has not improved despite completing a course of acupuncture and continued use of lyrica.  She has been evaluated for MM for chronic pain and deemed to be a candidate. \par \par -MRI lumbar spine w/o contrast reviewed and results were discussed in detail with patient and her daughter. Will wait on official report and discuss further management based on findings. \par -continue PT/HEP as tolerated with gentle ROM and functional exercises with therapeutic modalities. Consider aqua therapy.\par -Start to wean Lyrica gradually.  No side effects from previous dose, however patient not finding significant relief.  Withdrawal side effects were discussed. Lyrica 25 mg PO AM and 50 mg PO qHS ordered.  [place]-stop checked and no signs of abuse.\par - Start Meloxicam 7.5mg PO daily. Risks and benefits were reviewed. Denies history of DM or CKD. \par - Reviewed and filled out disability form this visit for parking permit.\par - Recommended to follow up for medical marijuana evaluation. Patient would like to find a provider at [facility].\par - RTC with telehealth appointment to review final MRI results, monitor lyrica taper, and progress with therapy.  \par \par Liam Spicer MD\par Spine and Sports Medicine\par \par [employer]\par At [facility]\par \par

## 2021-11-25 NOTE — HISTORY OF PRESENT ILLNESS
[FreeTextEntry1] : 82 yo F with PMH L3-L4 laminectomy 1/2020, parkinson's disease, hip AVN s/p hip replacement who presents with low back pain.\par \par 11/22/21\par Pt presents today for follow up. She reports that her lower back pain has progressed in intensity and frequency. She feels the pain is now constant and can't find relief despite increased Lyrica dose. She is having difficulty sleeping and finding a comfortable position. She reports she now has radiation going past her right knee. She does report a fall a few months ago after feeling dizzy that did not result in injury. Patient denies new weakness, numbness or paresthesia.  Denies bowel/bladder dysfunction, saddle anesthesia, fevers, chills, weight loss, night pain, or night sweats.\par \par 10/18/21\par 82 yo F who presents for follow up with low back pain.  Patient reports that low back pain has since progressed.  Patient reports that she is having worsening pain in bilateral posterior thighs.  No side effects or benefits from lyrica 25 mg - 50 mg PO.  Patient reports right hip is becoming weaker.  Patient denies new weakness, numbness or paresthesia.  Denies bowel/bladder dysfunction, saddle anesthesia, fevers, chills, weight loss, night pain, or night sweats.\par \par 9/13/21\par 82 yo F who presents for follow up with low back pain.  Patient reports that low back pain is about the same as previously.  Patient is taking lyrica 50 mg qHS with no appreciable side effects but no improvement.  Patient reports  that pain is about the same as previously.  Patient has taken naproxen and tylenol but without improvement in her pain.  Patient reports that pain is severe but she is not interested in pursuing spinal interventions at this time.  Patient has been participating with acupuncture without benefit.  Patient had participated in PT/HEP with some improvement in the past.  Patient has been participating in water aerobics with some improvement.  Patient denies new weakness, numbness or paresthesia.  Denies bowel/bladder dysfunction, saddle anesthesia, fevers, chills, weight loss, night pain, or night sweats.\par \par 7/25/21\par Ms. Edward presents for follow up for LBP. Last visit she was provided a referral for acupuncture and rx lyrica 25mg. Lyrica dose was increased to 50mg on 7/14/21. She denies any side effects from Lyrica. She had 3 total sessions of acupuncture. She noted total relief after her 3rd session.  Pain was previously 7-9/10 on her last visit.  Since then, pain is at a 5-6/10.   \par \par Patient denies new weakness, numbness or paresthesia.  Patient denies bowel/bladder dysfunction, fevers, chills, weight loss, night pain, or night sweats.\par \par \par 6/15/21\par Onset: several years but worse over the last year.  No inciting events, trauma, or falls.\par Location: lower lumbar spine\par Characteristics: sharp \par Aggravating factors: prolonged standing, prolonged walking, sit to stand transfers\par Alleviating factors: rest\par Radiation: left lower extremity (left thigh)\par Treatments: tylenol, naproxen, rest, physical therapy, HEP with some relief.  Patient has had lumbar spine injections previously but is not interested in pursuing these interventions at this time.   Patient has tried gabapentin with minimal relief in her pain. \par Severity: 7-9/10\par \par Diagnostic studies:\par MRI lumbar spine 8/2020 showing post laminectomies at L3-L4 and L4-L5 with an increase in anterolisthesis of L3 on L4 with moderate to severe bilateral foraminal narrowing.\par No previous EMG/NCS\par \par Patient denies new weakness, numbness or paresthesia.  Patient denies bowel/bladder dysfunction, fevers, chills, weight loss, night pain, or night sweats.\par

## 2021-11-25 NOTE — DATA REVIEWED
[MRI] : MRI [FreeTextEntry1] : MRI Lumbar Spine (11/17/21):\par Preliminarily reviewed. Imaging demonstrated increased anterolisthesis of L3-L4 (Grade II) compared to prior MRI 08/08/21 with central canal stenosis.

## 2021-12-02 ENCOUNTER — APPOINTMENT (OUTPATIENT)
Dept: ORTHOPEDIC SURGERY | Facility: CLINIC | Age: 81
End: 2021-12-02
Payer: MEDICARE

## 2021-12-02 DIAGNOSIS — M48.07 SPINAL STENOSIS, LUMBOSACRAL REGION: ICD-10-CM

## 2021-12-02 PROCEDURE — 72100 X-RAY EXAM L-S SPINE 2/3 VWS: CPT

## 2021-12-02 PROCEDURE — 99214 OFFICE O/P EST MOD 30 MIN: CPT

## 2021-12-27 ENCOUNTER — APPOINTMENT (OUTPATIENT)
Dept: PHYSICAL MEDICINE AND REHAB | Facility: CLINIC | Age: 81
End: 2021-12-27
Payer: MEDICARE

## 2021-12-27 PROCEDURE — 99443: CPT | Mod: 95

## 2021-12-28 ENCOUNTER — TRANSCRIPTION ENCOUNTER (OUTPATIENT)
Age: 81
End: 2021-12-28

## 2022-01-15 ENCOUNTER — INPATIENT (INPATIENT)
Facility: HOSPITAL | Age: 82
LOS: 3 days | Discharge: HOME | DRG: 281 | End: 2022-01-19
Attending: INTERNAL MEDICINE | Admitting: INTERNAL MEDICINE
Payer: MEDICARE

## 2022-01-15 ENCOUNTER — TRANSCRIPTION ENCOUNTER (OUTPATIENT)
Age: 82
End: 2022-01-15

## 2022-01-15 VITALS
HEART RATE: 147 BPM | RESPIRATION RATE: 19 BRPM | WEIGHT: 126.1 LBS | OXYGEN SATURATION: 77 % | DIASTOLIC BLOOD PRESSURE: 63 MMHG | HEIGHT: 59 IN | SYSTOLIC BLOOD PRESSURE: 85 MMHG

## 2022-01-15 DIAGNOSIS — I48.91 UNSPECIFIED ATRIAL FIBRILLATION: ICD-10-CM

## 2022-01-15 DIAGNOSIS — Z98.890 OTHER SPECIFIED POSTPROCEDURAL STATES: Chronic | ICD-10-CM

## 2022-01-15 DIAGNOSIS — Z29.9 ENCOUNTER FOR PROPHYLACTIC MEASURES, UNSPECIFIED: ICD-10-CM

## 2022-01-15 DIAGNOSIS — G20 PARKINSON'S DISEASE: ICD-10-CM

## 2022-01-15 DIAGNOSIS — E03.9 HYPOTHYROIDISM, UNSPECIFIED: ICD-10-CM

## 2022-01-15 DIAGNOSIS — I10 ESSENTIAL (PRIMARY) HYPERTENSION: ICD-10-CM

## 2022-01-15 DIAGNOSIS — R77.8 OTHER SPECIFIED ABNORMALITIES OF PLASMA PROTEINS: ICD-10-CM

## 2022-01-15 DIAGNOSIS — Z96.642 PRESENCE OF LEFT ARTIFICIAL HIP JOINT: Chronic | ICD-10-CM

## 2022-01-15 DIAGNOSIS — M81.0 AGE-RELATED OSTEOPOROSIS WITHOUT CURRENT PATHOLOGICAL FRACTURE: ICD-10-CM

## 2022-01-15 DIAGNOSIS — Z90.89 ACQUIRED ABSENCE OF OTHER ORGANS: Chronic | ICD-10-CM

## 2022-01-15 DIAGNOSIS — R55 SYNCOPE AND COLLAPSE: ICD-10-CM

## 2022-01-15 DIAGNOSIS — M54.9 DORSALGIA, UNSPECIFIED: ICD-10-CM

## 2022-01-15 DIAGNOSIS — E89.0 POSTPROCEDURAL HYPOTHYROIDISM: Chronic | ICD-10-CM

## 2022-01-15 LAB
ALBUMIN SERPL ELPH-MCNC: 3.3 G/DL — LOW (ref 3.5–5)
ALP SERPL-CCNC: 69 U/L — SIGNIFICANT CHANGE UP (ref 40–120)
ALT FLD-CCNC: 9 U/L DA — LOW (ref 10–60)
ANION GAP SERPL CALC-SCNC: 9 MMOL/L — SIGNIFICANT CHANGE UP (ref 5–17)
APPEARANCE UR: CLEAR — SIGNIFICANT CHANGE UP
APTT BLD: 24.9 SEC — LOW (ref 27.5–35.5)
AST SERPL-CCNC: 26 U/L — SIGNIFICANT CHANGE UP (ref 10–40)
BACTERIA # UR AUTO: ABNORMAL /HPF
BASE EXCESS BLDA CALC-SCNC: -2.6 MMOL/L — LOW (ref -2–3)
BASOPHILS # BLD AUTO: 0.07 K/UL — SIGNIFICANT CHANGE UP (ref 0–0.2)
BASOPHILS NFR BLD AUTO: 0.5 % — SIGNIFICANT CHANGE UP (ref 0–2)
BILIRUB SERPL-MCNC: 0.7 MG/DL — SIGNIFICANT CHANGE UP (ref 0.2–1.2)
BILIRUB UR-MCNC: NEGATIVE — SIGNIFICANT CHANGE UP
BLD GP AB SCN SERPL QL: SIGNIFICANT CHANGE UP
BLOOD GAS COMMENTS ARTERIAL: SIGNIFICANT CHANGE UP
BUN SERPL-MCNC: 23 MG/DL — HIGH (ref 7–18)
CALCIUM SERPL-MCNC: 8.8 MG/DL — SIGNIFICANT CHANGE UP (ref 8.4–10.5)
CHLORIDE SERPL-SCNC: 104 MMOL/L — SIGNIFICANT CHANGE UP (ref 96–108)
CO2 SERPL-SCNC: 26 MMOL/L — SIGNIFICANT CHANGE UP (ref 22–31)
COLOR SPEC: YELLOW — SIGNIFICANT CHANGE UP
CREAT SERPL-MCNC: 1.16 MG/DL — SIGNIFICANT CHANGE UP (ref 0.5–1.3)
DIFF PNL FLD: ABNORMAL
EOSINOPHIL # BLD AUTO: 0.04 K/UL — SIGNIFICANT CHANGE UP (ref 0–0.5)
EOSINOPHIL NFR BLD AUTO: 0.3 % — SIGNIFICANT CHANGE UP (ref 0–6)
EPI CELLS # UR: SIGNIFICANT CHANGE UP /HPF
GLUCOSE SERPL-MCNC: 136 MG/DL — HIGH (ref 70–99)
GLUCOSE UR QL: NEGATIVE — SIGNIFICANT CHANGE UP
HCO3 BLDA-SCNC: 19 MMOL/L — LOW (ref 21–28)
HCT VFR BLD CALC: 39.6 % — SIGNIFICANT CHANGE UP (ref 34.5–45)
HGB BLD-MCNC: 12.8 G/DL — SIGNIFICANT CHANGE UP (ref 11.5–15.5)
HOROWITZ INDEX BLDA+IHG-RTO: 100 — SIGNIFICANT CHANGE UP
IMM GRANULOCYTES NFR BLD AUTO: 0.5 % — SIGNIFICANT CHANGE UP (ref 0–1.5)
INR BLD: 0.95 RATIO — SIGNIFICANT CHANGE UP (ref 0.88–1.16)
KETONES UR-MCNC: NEGATIVE — SIGNIFICANT CHANGE UP
LACTATE SERPL-SCNC: 1 MMOL/L — SIGNIFICANT CHANGE UP (ref 0.7–2)
LACTATE SERPL-SCNC: 4.3 MMOL/L — CRITICAL HIGH (ref 0.7–2)
LEUKOCYTE ESTERASE UR-ACNC: NEGATIVE — SIGNIFICANT CHANGE UP
LYMPHOCYTES # BLD AUTO: 1.6 K/UL — SIGNIFICANT CHANGE UP (ref 1–3.3)
LYMPHOCYTES # BLD AUTO: 11 % — LOW (ref 13–44)
MCHC RBC-ENTMCNC: 28.1 PG — SIGNIFICANT CHANGE UP (ref 27–34)
MCHC RBC-ENTMCNC: 32.3 GM/DL — SIGNIFICANT CHANGE UP (ref 32–36)
MCV RBC AUTO: 86.8 FL — SIGNIFICANT CHANGE UP (ref 80–100)
MONOCYTES # BLD AUTO: 1.01 K/UL — HIGH (ref 0–0.9)
MONOCYTES NFR BLD AUTO: 6.9 % — SIGNIFICANT CHANGE UP (ref 2–14)
NEUTROPHILS # BLD AUTO: 11.8 K/UL — HIGH (ref 1.8–7.4)
NEUTROPHILS NFR BLD AUTO: 80.8 % — HIGH (ref 43–77)
NITRITE UR-MCNC: NEGATIVE — SIGNIFICANT CHANGE UP
NRBC # BLD: 0 /100 WBCS — SIGNIFICANT CHANGE UP (ref 0–0)
PCO2 BLDA: 26 MMHG — LOW (ref 32–35)
PH BLDA: 7.48 — HIGH (ref 7.35–7.45)
PH UR: 7 — SIGNIFICANT CHANGE UP (ref 5–8)
PLATELET # BLD AUTO: 313 K/UL — SIGNIFICANT CHANGE UP (ref 150–400)
PO2 BLDA: 396 MMHG — HIGH (ref 83–108)
POTASSIUM SERPL-MCNC: 3.3 MMOL/L — LOW (ref 3.5–5.3)
POTASSIUM SERPL-SCNC: 3.3 MMOL/L — LOW (ref 3.5–5.3)
PROT SERPL-MCNC: 7.1 G/DL — SIGNIFICANT CHANGE UP (ref 6–8.3)
PROT UR-MCNC: 30 MG/DL
PROTHROM AB SERPL-ACNC: 11.3 SEC — SIGNIFICANT CHANGE UP (ref 10.6–13.6)
RAPID RVP RESULT: SIGNIFICANT CHANGE UP
RBC # BLD: 4.56 M/UL — SIGNIFICANT CHANGE UP (ref 3.8–5.2)
RBC # FLD: 16.8 % — HIGH (ref 10.3–14.5)
RBC CASTS # UR COMP ASSIST: ABNORMAL /HPF (ref 0–2)
SAO2 % BLDA: 100 % — SIGNIFICANT CHANGE UP
SARS-COV-2 RNA SPEC QL NAA+PROBE: SIGNIFICANT CHANGE UP
SODIUM SERPL-SCNC: 139 MMOL/L — SIGNIFICANT CHANGE UP (ref 135–145)
SP GR SPEC: 1 — LOW (ref 1.01–1.02)
TROPONIN I, HIGH SENSITIVITY RESULT: 7305.7 NG/L — HIGH
TROPONIN I, HIGH SENSITIVITY RESULT: HIGH NG/L
UROBILINOGEN FLD QL: NEGATIVE — SIGNIFICANT CHANGE UP
WBC # BLD: 14.6 K/UL — HIGH (ref 3.8–10.5)
WBC # FLD AUTO: 14.6 K/UL — HIGH (ref 3.8–10.5)
WBC UR QL: SIGNIFICANT CHANGE UP /HPF (ref 0–5)

## 2022-01-15 PROCEDURE — 70450 CT HEAD/BRAIN W/O DYE: CPT | Mod: 26,MA

## 2022-01-15 PROCEDURE — 71275 CT ANGIOGRAPHY CHEST: CPT | Mod: 26,MA

## 2022-01-15 PROCEDURE — 99291 CRITICAL CARE FIRST HOUR: CPT

## 2022-01-15 PROCEDURE — 99292 CRITICAL CARE ADDL 30 MIN: CPT | Mod: FS

## 2022-01-15 RX ORDER — ENOXAPARIN SODIUM 100 MG/ML
60 INJECTION SUBCUTANEOUS EVERY 12 HOURS
Refills: 0 | Status: DISCONTINUED | OUTPATIENT
Start: 2022-01-15 | End: 2022-01-19

## 2022-01-15 RX ORDER — CARBIDOPA AND LEVODOPA 25; 100 MG/1; MG/1
1.5 TABLET ORAL EVERY 8 HOURS
Refills: 0 | Status: DISCONTINUED | OUTPATIENT
Start: 2022-01-15 | End: 2022-01-19

## 2022-01-15 RX ORDER — METOPROLOL TARTRATE 50 MG
5 TABLET ORAL EVERY 6 HOURS
Refills: 0 | Status: DISCONTINUED | OUTPATIENT
Start: 2022-01-15 | End: 2022-01-19

## 2022-01-15 RX ORDER — ONDANSETRON 8 MG/1
4 TABLET, FILM COATED ORAL EVERY 8 HOURS
Refills: 0 | Status: DISCONTINUED | OUTPATIENT
Start: 2022-01-15 | End: 2022-01-19

## 2022-01-15 RX ORDER — CANNABIDIOL 100 MG/ML
3.5 SOLUTION ORAL
Qty: 0 | Refills: 0 | DISCHARGE

## 2022-01-15 RX ORDER — ACETAMINOPHEN 500 MG
650 TABLET ORAL EVERY 6 HOURS
Refills: 0 | Status: DISCONTINUED | OUTPATIENT
Start: 2022-01-15 | End: 2022-01-19

## 2022-01-15 RX ORDER — SODIUM CHLORIDE 9 MG/ML
1000 INJECTION INTRAMUSCULAR; INTRAVENOUS; SUBCUTANEOUS ONCE
Refills: 0 | Status: COMPLETED | OUTPATIENT
Start: 2022-01-15 | End: 2022-01-15

## 2022-01-15 RX ORDER — ASPIRIN/CALCIUM CARB/MAGNESIUM 324 MG
81 TABLET ORAL DAILY
Refills: 0 | Status: DISCONTINUED | OUTPATIENT
Start: 2022-01-15 | End: 2022-01-19

## 2022-01-15 RX ORDER — CARBIDOPA AND LEVODOPA 25; 100 MG/1; MG/1
1 TABLET ORAL
Qty: 0 | Refills: 0 | DISCHARGE

## 2022-01-15 RX ORDER — ENOXAPARIN SODIUM 100 MG/ML
40 INJECTION SUBCUTANEOUS DAILY
Refills: 0 | Status: DISCONTINUED | OUTPATIENT
Start: 2022-01-15 | End: 2022-01-15

## 2022-01-15 RX ORDER — ATORVASTATIN CALCIUM 80 MG/1
40 TABLET, FILM COATED ORAL AT BEDTIME
Refills: 0 | Status: DISCONTINUED | OUTPATIENT
Start: 2022-01-15 | End: 2022-01-19

## 2022-01-15 RX ORDER — SODIUM CHLORIDE 9 MG/ML
1000 INJECTION INTRAMUSCULAR; INTRAVENOUS; SUBCUTANEOUS
Refills: 0 | Status: DISCONTINUED | OUTPATIENT
Start: 2022-01-15 | End: 2022-01-16

## 2022-01-15 RX ORDER — LACTULOSE 10 G/15ML
15 SOLUTION ORAL
Qty: 0 | Refills: 0 | DISCHARGE

## 2022-01-15 RX ORDER — MULTIVIT-MIN/FERROUS GLUCONATE 9 MG/15 ML
1 LIQUID (ML) ORAL DAILY
Refills: 0 | Status: DISCONTINUED | OUTPATIENT
Start: 2022-01-15 | End: 2022-01-19

## 2022-01-15 RX ORDER — METOPROLOL TARTRATE 50 MG
12.5 TABLET ORAL EVERY 12 HOURS
Refills: 0 | Status: DISCONTINUED | OUTPATIENT
Start: 2022-01-15 | End: 2022-01-19

## 2022-01-15 RX ORDER — OXYCODONE HYDROCHLORIDE 5 MG/1
1 TABLET ORAL
Qty: 0 | Refills: 0 | DISCHARGE

## 2022-01-15 RX ORDER — ALENDRONATE SODIUM 70 MG/1
1 TABLET ORAL
Qty: 0 | Refills: 0 | DISCHARGE

## 2022-01-15 RX ORDER — RASAGILINE 0.5 MG/1
0.5 TABLET ORAL DAILY
Refills: 0 | Status: DISCONTINUED | OUTPATIENT
Start: 2022-01-15 | End: 2022-01-19

## 2022-01-15 RX ORDER — LANOLIN ALCOHOL/MO/W.PET/CERES
3 CREAM (GRAM) TOPICAL AT BEDTIME
Refills: 0 | Status: DISCONTINUED | OUTPATIENT
Start: 2022-01-15 | End: 2022-01-19

## 2022-01-15 RX ORDER — PANTOPRAZOLE SODIUM 20 MG/1
40 TABLET, DELAYED RELEASE ORAL
Refills: 0 | Status: DISCONTINUED | OUTPATIENT
Start: 2022-01-15 | End: 2022-01-19

## 2022-01-15 RX ORDER — RASAGILINE 0.5 MG/1
1 TABLET ORAL
Qty: 0 | Refills: 0 | DISCHARGE

## 2022-01-15 RX ORDER — LEVOTHYROXINE SODIUM 125 MCG
100 TABLET ORAL DAILY
Refills: 0 | Status: DISCONTINUED | OUTPATIENT
Start: 2022-01-15 | End: 2022-01-19

## 2022-01-15 RX ORDER — CHOLECALCIFEROL (VITAMIN D3) 125 MCG
2000 CAPSULE ORAL DAILY
Refills: 0 | Status: DISCONTINUED | OUTPATIENT
Start: 2022-01-15 | End: 2022-01-19

## 2022-01-15 RX ORDER — CARBIDOPA AND LEVODOPA 25; 100 MG/1; MG/1
1.5 TABLET ORAL
Qty: 0 | Refills: 0 | DISCHARGE

## 2022-01-15 RX ADMIN — SODIUM CHLORIDE 1000 MILLILITER(S): 9 INJECTION INTRAMUSCULAR; INTRAVENOUS; SUBCUTANEOUS at 15:10

## 2022-01-15 RX ADMIN — SODIUM CHLORIDE 1000 MILLILITER(S): 9 INJECTION INTRAMUSCULAR; INTRAVENOUS; SUBCUTANEOUS at 15:53

## 2022-01-15 NOTE — H&P ADULT - PROBLEM SELECTOR PLAN 6
Pt on Fosamax 70mg Qweekly   Will hold inpatient  Fall precautions   vitamin D supplementation, F/u vitamin D Pt on 100mcg x 6 days and 50mcg x 1 day   F/u TFTs, may be inc her own dosage resulting in Afib

## 2022-01-15 NOTE — H&P ADULT - HISTORY OF PRESENT ILLNESS
Patient is a 80 y/o female with HTN, HLD, hypothyroidism (S/p total thyroidectomy), Parkinson's Disease, avascular necrosis of bilateral hips (s/p intraarticular steroid injections), OA (s/p left THR 5/2019, R THR 3/2/20), s/p lumbar laminectomy with fusion on 3/1/19,  and s/p L3-4 right hemilaminotomy and discectomy 1/18/2020 was BIBEMS s/p syncopal episode sustained in her home. Patient reports that she had was walking within her house when she felt warm and lightheaded prior to falling to her knees; she denied LOC or head trauma. At that point she was able to drag herself to her bedroom and press her med-alert necklace to alert EMS. She denied any preceding chest pain, palpitations, leg swelling, headache, focal weakness in her extremities (apart from her baseline numbness d/t spinal stenosis), dysuria, urinary incontinence, or tongue bite. Of note, she reports taking medical marijuana drops (1:1 THC/CBD concentration) to deal with chronic pain and has increased her dose to 100mg 1 month ago. At the same time, she began having episodes of near syncope prior to the one that currently brought her in; these were also preceded by feelings of warmth and lightheadedness. She has no prior history of cardiac arrythmias.

## 2022-01-15 NOTE — ED PROVIDER NOTE - CLINICAL SUMMARY MEDICAL DECISION MAKING FREE TEXT BOX
81F presenting after possible syncopal episode. initially hypoxic and hypotensive but symptoms improved. CTA negative for PE. heart rate improved with 1L NS. patient's niece updated on all results. likely new onset afib 2/2 dehydration. will admit.

## 2022-01-15 NOTE — ED ADULT NURSE REASSESSMENT NOTE - NS ED NURSE REASSESS COMMENT FT1
Pt symptoms improved.  Denies chest, SOB, dizziness.  Breath steady & unlabored.  BP improved, currently 139/93.  AOx3.  Per Jorge L BROWNE, pt likely has new onset A-fib.  NS infusing.  Will continue to monitor frequently.

## 2022-01-15 NOTE — ED PROVIDER NOTE - OBJECTIVE STATEMENT
81F, pmh of parkinson's disease, mitral valve prolapse, presenting after syncopal episode. patient reports she went to get something ot drink when she suddenly had "ingestion" and fell to her knees. unclear if she lost consciousness. sonya reported to EMS that the patient seemed confused and not like her normal self. patient denies any fever, chest pain, shortness of breath, nausea, vomiting, abdominal pain.       [phone]-Alexia (niece)

## 2022-01-15 NOTE — ED PROVIDER NOTE - PHYSICAL EXAMINATION
General: uncomfortable appearing female, no acute distress   HEENT: normocephalic, atraumatic   Respiratory: normal work of breathing, lungs clear to auscultation bilaterally   Cardiac: irregular, 2+ cap refill  Abdomen: soft, non-tender, no guarding or rebound   MSK: no swelling or tenderness of lower extremities, moving all extremities spontaneously   Skin: warm, dry   Neuro: A&Ox3  Psych: appropriate affect

## 2022-01-15 NOTE — H&P ADULT - NSHPPHYSICALEXAM_GEN_ALL_CORE
GENERAL APPEARANCE: Well developed, AA0x3, in NAD   HEENT: Normocephalic, PERRL, EOMI External auditory canals clear, hearing grossly intact, no nasal discharge   THROAT: Oral cavity and pharynx normal. No inflammation, swelling, exudate, or lesions.  CARDIAC: Normal S1 and S2. No S3, S4 or murmurs. Rhythm is regular. There is no peripheral edema, cyanosis or pallor.  LUNGS: Clear to auscultation and percussion without rales, rhonchi, wheezing or diminished breath sounds.  ABDOMEN: Positive bowel sounds. Soft, nondistended, nontender. No guarding or rebound. No masses.  EXTREMITIES: No significant deformity or joint abnormality. No edema. Peripheral pulses intact. No varicosities.  NEUROLOGICAL: CN II-XII intact. Strength and sensation symmetric and intact throughout. Reflexes 2+ throughout.   SKIN: No skin breakdown, lesions or rashes noted GENERAL APPEARANCE: thin, AA0x3  lady, in NAD   HEENT: Normocephalic, PERRL, EOMI External auditory canals clear, hearing grossly intact, no nasal discharge   THROAT: Oral cavity and pharynx normal. No inflammation, swelling, exudate, or lesions.  CARDIAC: Irregularly irregular HR,  There is no peripheral edema, cyanosis or pallor.  LUNGS: Clear to auscultation and percussion without rales, rhonchi, wheezing or diminished breath sounds.  ABDOMEN: Positive bowel sounds. Soft, nondistended, nontender. No guarding or rebound. No masses.  EXTREMITIES: No significant deformity or joint abnormality. No edema. Peripheral pulses intact. No varicosities.  NEUROLOGICAL: CN II-XII intact. increased muscular tone, and mask facies +  SKIN: No skin breakdown, lesions or rashes noted

## 2022-01-15 NOTE — H&P ADULT - NSHPSOCIALHISTORY_GEN_ALL_CORE
Lives at home alone, no HHA; reports that the building super helps deliver things to her   Has a niece who lives in [facility], her two children live [place]

## 2022-01-15 NOTE — H&P ADULT - PROBLEM SELECTOR PLAN 2
New onset Afib RVR   Rate was > 140, and the improved to < 110 s/p IVF bolus   No obvious signs of fluid overload, no chest pain or Hypotension  Started on rate control with Lopressor 12.5mg PO BID, and IVP 5mg Q6h for HR> 140bpm  Explained the risks vs benefits of AC to the patient and started on full dose lovenox 60mg BID   F/u Echo, TFTs  Cardio consulted Dr. Jiménez New onset Afib RVR   Rate was > 140, and the improved to < 110 s/p IVF bolus   No obvious signs of fluid overload, no chest pain or Hypotension  Started on rate control with Lopressor 12.5mg PO BID, and IVP 5mg Q6h for HR> 140bpm  Lactate 4.3, no known source of infection -> repeat after IVF   Explained the risks vs benefits of AC to the patient and started on full dose lovenox 60mg BID   F/u Echo, TFTs  Cardio consulted Dr. Jiménez

## 2022-01-15 NOTE — ED ADULT NURSE NOTE - OBJECTIVE STATEMENT
Syncopal episode this morning at approx. 0800.  Pt rpeorts that she was pouring herself a glass of juice when she "collapsed."  Pt states she had to crawl.  Pt wears life alert, she pressed button EMS was called.  Pt VS unstable intially, Pt awake & alert, AOx3, on 15L NRB, on cardiac monitor.  Denies fever, chills, NVD, chest pain or SOB.  Pt endorses feeling dizzy at time of episode but denies it currently. Syncopal episode this morning at approx. 0800.  Pt rpeorts that she was pouring herself a glass of juice when she "collapsed."  Pt states she had to crawl.  Pt wears life alert, she pressed button EMS was called.  Pt VS unstable intially, Pt awake & alert, AOx3, on 15L NRB, on cardiac monitor.  Denies fever, chills, NVD, chest pain or SOB.  Pt endorses feeling dizzy at time of episode but denies it currently.  PMH avascular necrosis after receiving a shot of prednisone.

## 2022-01-15 NOTE — H&P ADULT - PROBLEM SELECTOR PLAN 8
Lovenox 60mg BID   Protonix 40mg Qd   IMPROVE VTE Individual Risk Assessment  RISK                                                                Points  [  ] Previous VTE                                                  3  [x  ] Thrombophilia                                               2  [  ] Lower limb paralysis                                      2        (unable to hold up >15 seconds)    [  ] Current Cancer                                              2         (within 6 months)  [x  ] Immobilization > 24 hrs                                1  [  ] ICU/CCU stay > 24 hours                              1  [ x ] Age > 60                                                      1  IMPROVE VTE Score _____4____

## 2022-01-15 NOTE — ED ADULT NURSE NOTE - ED STAT RN HANDOFF DETAILS
patient admitted to telemetry with box A afib, in no acute distress endorsed to hold RN Mable. Patient transported to holding via stretcher stable in no acute distress safety maintained.

## 2022-01-15 NOTE — ED ADULT NURSE NOTE - NSIMPLEMENTINTERV_GEN_ALL_ED
Implemented All Fall Risk Interventions:  [place] to call system. Call bell, personal items and telephone within reach. Instruct patient to call for assistance. Room bathroom lighting operational. Non-slip footwear when patient is off stretcher. Physically safe environment: no spills, clutter or unnecessary equipment. Stretcher in lowest position, wheels locked, appropriate side rails in place. Provide visual cue, wrist band, yellow gown, etc. Monitor gait and stability. Monitor for mental status changes and reorient to person, place, and time. Review medications for side effects contributing to fall risk. Reinforce activity limits and safety measures with patient and family.

## 2022-01-15 NOTE — H&P ADULT - PROBLEM SELECTOR PLAN 7
Lovenox 60mg BID   Protonix 40mg Qd   IMPROVE VTE Individual Risk Assessment  RISK                                                                Points  [  ] Previous VTE                                                  3  [x  ] Thrombophilia                                               2  [  ] Lower limb paralysis                                      2        (unable to hold up >15 seconds)    [  ] Current Cancer                                              2         (within 6 months)  [x  ] Immobilization > 24 hrs                                1  [  ] ICU/CCU stay > 24 hours                              1  [ x ] Age > 60                                                      1  IMPROVE VTE Score _____4____ Pt on Fosamax 70mg Qweekly   Will hold inpatient  Fall precautions   vitamin D supplementation, F/u vitamin D

## 2022-01-15 NOTE — H&P ADULT - PROBLEM SELECTOR PLAN 1
Likely multifactorial  Pt had classic prodrome for vasovagal syncope, high suspicion for cardiac syncope (new onset afib), orthostatic hypotension (PD on treatment), and drug induced (inc doses of THC/CBD)  C/w IVF for now, and f/u orthostatic BP   C/w tele monitoring   Hold all sedating medications (pt on medical marijuana, and previously Gabapentin, oxycodone)  C/w home dose of CD/LD for now (pt takes Q8h, may not be appropriately controlled)  Pt consult    Neuro consulted Dr. Bruno Likely multifactorial  Pt had classic prodrome for vasovagal syncope, high suspicion for cardiac syncope (new onset afib), orthostatic hypotension (PD on treatment), and drug induced (inc doses of THC/CBD)  lactate elevated w/o signs of infection or SIRS, will hold Abx for now   C/w IVF for now, and f/u orthostatic BP   C/w tele monitoring   Hold all sedating medications (pt on medical marijuana, and previously Gabapentin, oxycodone)  C/w home dose of CD/LD for now (pt takes Q8h, may not be appropriately controlled)  Pt consult    Neuro consulted Dr. Bruno

## 2022-01-15 NOTE — H&P ADULT - PROBLEM SELECTOR PLAN 4
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

## 2022-01-15 NOTE — H&P ADULT - PROBLEM SELECTOR PLAN 5
Pt on 100mcg x 6 days and 50mcg x 1 day   F/u TFTs, may be inc her own dosage resulting in Afib S/p multiple ortho procedures including laminectomy and B/L THR   Pt previously on gabapentin and oxycodone, and on medical marijuana (recently inc her own dosage)  Pain Mng consult

## 2022-01-15 NOTE — H&P ADULT - ASSESSMENT
Patient is a 78 y/o female with HTN, HLD, hypothyroidism (S/p total thyroidectomy), Parkinson's Disease, avascular necrosis of bilateral hips (s/p intraarticular steroid injections), OA (s/p left THR 5/2019, R THR 3/2/20), s/p lumbar laminectomy with fusion on 3/1/19,  and s/p L3-4 right hemilaminotomy and discectomy 1/18/2020 was BIBEMS s/p syncopal episode sustained in her home. Admitted for syncope workup and new onset Afib RVR.      In the ED,  VS; 97.9, , /93mmHg, RR 17, 97% on RA   Labs significant for WBC 14.6, campbell 80.8, K+ 3.3, Lactate 4.3, Trop 332.4  ABG 7.48/26/396/19 (metabolic acidosis w/ respiratory compensation)  CTPA negative for PE   CTH with minimal white matter ischemia and no acute CVA/bleed  EKG showing AFib RVR      Patient is a 80 y/o female with HTN, HLD, hypothyroidism (S/p total thyroidectomy), Parkinson's Disease, avascular necrosis of bilateral hips (s/p intraarticular steroid injections), OA (s/p left THR 5/2019, R THR 3/2/20), s/p lumbar laminectomy with fusion on 3/1/19,  and s/p L3-4 right hemilaminotomy and discectomy 1/18/2020 was BIBEMS s/p syncopal episode sustained in her home. Admitted for syncope workup and new onset Afib RVR.      In the ED,  VS; 97.9, , /93mmHg, RR 17, 97% on RA   Labs significant for WBC 14.6, campbell 80.8, K+ 3.3, Lactate 4.3, Trop 332.4  ABG 7.48/26/396/19 (metabolic acidosis w/ respiratory compensation)  CTPA negative for PE   CTH with minimal white matter ischemia and no acute CVA/bleed  EKG showing AFib RVR

## 2022-01-15 NOTE — H&P ADULT - PROBLEM SELECTOR PLAN 3
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 Jiménez Cardio consulted: Discussed the high trop with him, and as there as no ST changes, likely 2/2 to dCHF vs AFIB; no need to transfer for cardiac cath at this time as per Cardio

## 2022-01-16 LAB
ANION GAP SERPL CALC-SCNC: 8 MMOL/L — SIGNIFICANT CHANGE UP (ref 5–17)
BUN SERPL-MCNC: 21 MG/DL — HIGH (ref 7–18)
CALCIUM SERPL-MCNC: 7.8 MG/DL — LOW (ref 8.4–10.5)
CHLORIDE SERPL-SCNC: 109 MMOL/L — HIGH (ref 96–108)
CO2 SERPL-SCNC: 23 MMOL/L — SIGNIFICANT CHANGE UP (ref 22–31)
CREAT SERPL-MCNC: 0.84 MG/DL — SIGNIFICANT CHANGE UP (ref 0.5–1.3)
GLUCOSE SERPL-MCNC: 117 MG/DL — HIGH (ref 70–99)
HCT VFR BLD CALC: 35.5 % — SIGNIFICANT CHANGE UP (ref 34.5–45)
HGB BLD-MCNC: 11.4 G/DL — LOW (ref 11.5–15.5)
MCHC RBC-ENTMCNC: 27.8 PG — SIGNIFICANT CHANGE UP (ref 27–34)
MCHC RBC-ENTMCNC: 32.1 GM/DL — SIGNIFICANT CHANGE UP (ref 32–36)
MCV RBC AUTO: 86.6 FL — SIGNIFICANT CHANGE UP (ref 80–100)
NRBC # BLD: 0 /100 WBCS — SIGNIFICANT CHANGE UP (ref 0–0)
PLATELET # BLD AUTO: 253 K/UL — SIGNIFICANT CHANGE UP (ref 150–400)
POTASSIUM SERPL-MCNC: 3.1 MMOL/L — LOW (ref 3.5–5.3)
POTASSIUM SERPL-SCNC: 3.1 MMOL/L — LOW (ref 3.5–5.3)
RBC # BLD: 4.1 M/UL — SIGNIFICANT CHANGE UP (ref 3.8–5.2)
RBC # FLD: 17 % — HIGH (ref 10.3–14.5)
SODIUM SERPL-SCNC: 140 MMOL/L — SIGNIFICANT CHANGE UP (ref 135–145)
T3 SERPL-MCNC: 93 NG/DL — SIGNIFICANT CHANGE UP (ref 80–200)
T4 AB SER-ACNC: 10.9 UG/DL — SIGNIFICANT CHANGE UP (ref 4.6–12)
TROPONIN I, HIGH SENSITIVITY RESULT: HIGH NG/L
TSH SERPL-MCNC: 0.2 UU/ML — LOW (ref 0.34–4.82)
WBC # BLD: 11.93 K/UL — HIGH (ref 3.8–10.5)
WBC # FLD AUTO: 11.93 K/UL — HIGH (ref 3.8–10.5)

## 2022-01-16 PROCEDURE — 99222 1ST HOSP IP/OBS MODERATE 55: CPT

## 2022-01-16 RX ORDER — FAMOTIDINE 10 MG/ML
20 INJECTION INTRAVENOUS DAILY
Refills: 0 | Status: COMPLETED | OUTPATIENT
Start: 2022-01-16 | End: 2022-01-16

## 2022-01-16 RX ORDER — POTASSIUM CHLORIDE 20 MEQ
40 PACKET (EA) ORAL ONCE
Refills: 0 | Status: COMPLETED | OUTPATIENT
Start: 2022-01-16 | End: 2022-01-16

## 2022-01-16 RX ADMIN — Medication 650 MILLIGRAM(S): at 22:39

## 2022-01-16 RX ADMIN — ENOXAPARIN SODIUM 60 MILLIGRAM(S): 100 INJECTION SUBCUTANEOUS at 05:38

## 2022-01-16 RX ADMIN — Medication 40 MILLIEQUIVALENT(S): at 11:01

## 2022-01-16 RX ADMIN — CARBIDOPA AND LEVODOPA 1.5 TABLET(S): 25; 100 TABLET ORAL at 22:26

## 2022-01-16 RX ADMIN — FAMOTIDINE 20 MILLIGRAM(S): 10 INJECTION INTRAVENOUS at 21:50

## 2022-01-16 RX ADMIN — Medication 1 TABLET(S): at 11:02

## 2022-01-16 RX ADMIN — RASAGILINE 0.5 MILLIGRAM(S): 0.5 TABLET ORAL at 13:44

## 2022-01-16 RX ADMIN — Medication 2000 UNIT(S): at 11:02

## 2022-01-16 RX ADMIN — Medication 81 MILLIGRAM(S): at 11:02

## 2022-01-16 RX ADMIN — Medication 100 MICROGRAM(S): at 05:37

## 2022-01-16 RX ADMIN — Medication 3 MILLIGRAM(S): at 22:43

## 2022-01-16 RX ADMIN — ATORVASTATIN CALCIUM 40 MILLIGRAM(S): 80 TABLET, FILM COATED ORAL at 21:50

## 2022-01-16 RX ADMIN — SODIUM CHLORIDE 75 MILLILITER(S): 9 INJECTION INTRAMUSCULAR; INTRAVENOUS; SUBCUTANEOUS at 05:37

## 2022-01-16 RX ADMIN — CARBIDOPA AND LEVODOPA 1.5 TABLET(S): 25; 100 TABLET ORAL at 14:55

## 2022-01-16 RX ADMIN — CARBIDOPA AND LEVODOPA 1.5 TABLET(S): 25; 100 TABLET ORAL at 05:36

## 2022-01-16 RX ADMIN — PANTOPRAZOLE SODIUM 40 MILLIGRAM(S): 20 TABLET, DELAYED RELEASE ORAL at 05:38

## 2022-01-16 RX ADMIN — Medication 12.5 MILLIGRAM(S): at 05:37

## 2022-01-16 RX ADMIN — ENOXAPARIN SODIUM 60 MILLIGRAM(S): 100 INJECTION SUBCUTANEOUS at 18:12

## 2022-01-16 RX ADMIN — Medication 30 MILLILITER(S): at 18:39

## 2022-01-16 RX ADMIN — Medication 12.5 MILLIGRAM(S): at 18:12

## 2022-01-16 NOTE — PATIENT PROFILE ADULT - FALL HARM RISK - HARM RISK INTERVENTIONS

## 2022-01-16 NOTE — CONSULT NOTE ADULT - SUBJECTIVE AND OBJECTIVE BOX
DATE OF SERVICE:  01/16/2022  Patient was seen,examined and evaluated  by me.ER evaluation, Labs and Hospital course was reviewed,    CHIEF COMPLAINT:Syncope    HPI:HPI:  Patient is a 80 y/o female with HTN, HLD, hypothyroidism (S/p total thyroidectomy), Parkinson's Disease, avascular necrosis of bilateral hips (s/p intraarticular steroid injections), OA (s/p left THR 5/2019, R THR 3/2/20), s/p lumbar laminectomy with fusion on 3/1/19,  and s/p L3-4 right hemilaminotomy and discectomy 1/18/2020 was BIBEMS s/p syncopal episode sustained in her home. Patient reports that she had was walking within her house when she felt warm and lightheaded prior to falling to her knees; she denied LOC or head trauma. At that point she was able to drag herself to her bedroom and press her med-alert necklace to alert EMS. She denied any preceding chest pain, palpitations, leg swelling, headache, focal weakness in her extremities (apart from her baseline numbness d/t spinal stenosis), dysuria, urinary incontinence, or tongue bite. Of note, she reports taking medical marijuana drops (1:1 THC/CBD concentration) to deal with chronic pain and has increased her dose to 100mg 1 month ago. At the same time, she began having episodes of near syncope prior to the one that currently brought her in; these were also preceded by feelings of warmth and lightheadedness. She has no prior history of cardiac arrythmias.      Remains in Atrial Fibrillation noted increasing troponons  TTE done preliminary-Normal LV Systolic Function no significant WMA or Valvular abnormalities    PAST MEDICAL & SURGICAL HISTORY:  Parkinson&#x27;s disease  dx 2012    Essential hypertension  currently not on medication    HLD (hyperlipidemia)    Osteoporosis    Hypothyroidism    Spondylolisthesis of lumbar region  s/p lumbar laminectomy with fusion in 3/2019    MARK positive    Edema of lower extremity  seen by PMD doppler done -as per patient normal    MVP (mitral valve prolapse)  pt reports asymptomatic, last Echo &gt;10 years ago    H/O Graves&#x27; disease  s/p thyroidectomy    OA (osteoarthritis)    History of thyroidectomy, total  1992    History of shoulder surgery  Right- long time ago    History of tonsillectomy  childhood    H/O colonoscopy  2018    S/P lumbar laminectomy  with fusion in 3/1/2019    Status post left hip replacement  5/15/2019        MEDICATIONS  (STANDING):  aspirin enteric coated 81 milliGRAM(s) Oral daily  atorvastatin 40 milliGRAM(s) Oral at bedtime  carbidopa/levodopa  25/100 1.5 Tablet(s) Oral every 8 hours  cholecalciferol 2000 Unit(s) Oral daily  enoxaparin Injectable 60 milliGRAM(s) SubCutaneous every 12 hours  levothyroxine 100 MICROGram(s) Oral daily  metoprolol tartrate 12.5 milliGRAM(s) Oral every 12 hours  multivitamin/minerals 1 Tablet(s) Oral daily  pantoprazole    Tablet 40 milliGRAM(s) Oral before breakfast  rasagiline Tablet 0.5 milliGRAM(s) Oral daily    MEDICATIONS  (PRN):  acetaminophen     Tablet .. 650 milliGRAM(s) Oral every 6 hours PRN Temp greater or equal to 38C (100.4F), Mild Pain (1 - 3)  aluminum hydroxide/magnesium hydroxide/simethicone Suspension 30 milliLiter(s) Oral every 4 hours PRN Dyspepsia  melatonin 3 milliGRAM(s) Oral at bedtime PRN Insomnia  metoprolol tartrate Injectable 5 milliGRAM(s) IV Push every 6 hours PRN HR> 140  ondansetron Injectable 4 milliGRAM(s) IV Push every 8 hours PRN Nausea and/or Vomiting      FAMILY HISTORY:  FH: kidney cancer  father    Family history of cerebral hemorrhage  mother    FH: lung cancer  sister    FH: heart disease  sister      No family history of premature coronary artery disease or sudden cardiac death    SOCIAL HISTORY:  Smoking-[ ] Active  [ ] Former [x ] Non Smoker  Alcohol-[x ] Denies [ ] Social [ ] Daily  Ilicit Drug use-[x] Denies [ ] Active user    REVIEW OF SYSTEMS:  Constitutional: [ ] fever, [ ]weight loss, [x ]fatigue   Activity [ ] Bedbound,[x ] Ambulates [ ] Unassisted[x] Cane/Walker [ ] Assistence.  Effort tolerance:[ ] Excellent [ ] Good [ ] Fair [ ] Poor [ ]  Eyes: [ ] visual changes  Respiratory: [ ]shortness of breath;  [ ] cough, [ ]wheezing, [ ]chills, [ ]hemoptysis  Cardiovascular: [ ] chest pain, [ ]palpitations, [ ]dizziness,  [ ]leg swelling[ ]orthopnea [ ]PND  Gastrointestinal: [ ] abdominal pain, [ ]nausea, [ ]vomiting,  [ ]diarrhea,[ ]constipation  Genitourinary: [ ] dysuria, [ ] hematuria  Neurologic: [ ] headaches [ ] tremors[ ] weakness  Skin: [ ] itching, [ ]burning, [ ] rashes  Endocrine: [ ] heat or cold intolerance  Musculoskeletal: [ ] joint pain or swelling; [ ] muscle, back, or extremity pain  Psychiatric: [ ] depression, [ ]anxiety, [ ]mood swings, or [ ]difficulty sleeping  Hematologic: [ ] easy bruising, [ ] bleeding gums       [ x] All others negative	  [ ] Unable to obtain    Vital Signs Last 24 Hrs  T(C): 36.7 (16 Jan 2022 17:42), Max: 36.8 (16 Jan 2022 05:12)  T(F): 98 (16 Jan 2022 17:42), Max: 98.3 (16 Jan 2022 05:12)  HR: 80 (16 Jan 2022 17:42) (72 - 94)  BP: 122/55 (16 Jan 2022 17:42) (102/68 - 183/86)  RR: 18 (16 Jan 2022 17:42) (18 - 19)  SpO2: 98% (16 Jan 2022 17:42) (98% - 99%)  I&O's Summary      PHYSICAL EXAM:  General: No acute distress BMI-29  HEENT: EOMI, PERRL[ ] Icteric  Neck: Supple, No JVD  Lungs: Equal air entry bilaterally; [ ] Rales [ ] Rhonchi [ ] Wheezing  Heart: Irregular rate and rhythm;[ x Murmurs-   2/6 [x ] Systolic [ ] Diastolic [ ] Radiation,No rubs, or gallops  Abdomen: Nontender, bowel sounds present  Extremities: No clubbing, cyanosis, or edema[ ] Calf tenderness  Nervous system:  Alert & Oriented X3, no focal deficits  Psychiatric: Normal affect  Skin: No rashes or lesions      LABS:  01-16    140  |  109<H>  |  21<H>  ----------------------------<  117<H>  3.1<L>   |  23  |  0.84    Ca    7.8<L>      16 Jan 2022 07:18    TPro  7.1  /  Alb  3.3<L>  /  TBili  0.7  /  DBili  x   /  AST  26  /  ALT  9<L>  /  AlkPhos  69  01-15    Creatinine Trend: 0.84<--, 1.16<--                        11.4   11.93 )-----------( 253      ( 16 Jan 2022 07:18 )             35.5     PT/INR - ( 15 Tyler 2022 13:30 )   PT: 11.3 sec;   INR: 0.95 ratio         PTT - ( 15 Tyler 2022 13:30 )  PTT:24.9 sec    Serum Pro-Brain Natriuretic Peptide: 406 pg/mL (01-15-22 @ 13:30)    Troponin I, High Sensitivity Result: 1002 <-- 82435 <---7305 <--332 ng/L    RADIOLOGY:  CT ANGIO CHEST PULM ART W  IMPRESSION:  No pulmonary thromboembolism or displaced fracture of included axial  skeleton      ECG [my interpretation]:Atrial Fibrillation at 138 BPM   Non specific ST abnormalities    TELEMETRY:Atrial Fibrillation    ECHO: Prelim  EF 55%

## 2022-01-16 NOTE — CONSULT NOTE ADULT - SUBJECTIVE AND OBJECTIVE BOX
Patient is a 81y old  Female who presents with a chief complaint of Syncope (16 Jan 2022 18:35)    He reports several episodes of sudden loss of power in the lower extremity causing him to drop to the floor without losing consciousness.  HPI:    PAST MEDICAL & SURGICAL HISTORY:  Parkinson&#x27;s disease  dx 2012    Essential hypertension  currently not on medication    HLD (hyperlipidemia)    Osteoporosis    Hypothyroidism    Spondylolisthesis of lumbar region  s/p lumbar laminectomy with fusion in 3/2019    MARK positive    Edema of lower extremity  seen by PMD doppler done -as per patient normal    MVP (mitral valve prolapse)  pt reports asymptomatic, last Echo &gt;10 years ago    H/O Graves&#x27; disease  s/p thyroidectomy    OA (osteoarthritis)    History of thyroidectomy, total  1992    History of shoulder surgery  Right- long time ago    History of tonsillectomy  childhood    H/O colonoscopy  2018    S/P lumbar laminectomy  with fusion in 3/1/2019    Status post left hip replacement  5/15/2019        FAMILY HISTORY:  FH: kidney cancer  father    Family history of cerebral hemorrhage  mother    FH: lung cancer  sister    FH: heart disease  sister          Social Hx:  Nonsmoker, no drug or alcohol use    MEDICATIONS  (STANDING):  aspirin enteric coated 81 milliGRAM(s) Oral daily  atorvastatin 40 milliGRAM(s) Oral at bedtime  carbidopa/levodopa  25/100 1.5 Tablet(s) Oral every 8 hours  cholecalciferol 2000 Unit(s) Oral daily  enoxaparin Injectable 60 milliGRAM(s) SubCutaneous every 12 hours  levothyroxine 100 MICROGram(s) Oral daily  metoprolol tartrate 12.5 milliGRAM(s) Oral every 12 hours  multivitamin/minerals 1 Tablet(s) Oral daily  pantoprazole    Tablet 40 milliGRAM(s) Oral before breakfast  rasagiline Tablet 0.5 milliGRAM(s) Oral daily       Allergies    No Known Allergies    Intolerances        ROS: Pertinent positives in HPI, all other ROS were reviewed and are negative.      Vital Signs Last 24 Hrs  T(C): 36.8 (16 Jan 2022 20:21), Max: 36.8 (16 Jan 2022 05:12)  T(F): 98.3 (16 Jan 2022 20:21), Max: 98.3 (16 Jan 2022 05:12)  HR: 88 (16 Jan 2022 20:21) (72 - 88)  BP: 173/79 (16 Jan 2022 20:21) (102/68 - 183/86)  BP(mean): --  RR: 18 (16 Jan 2022 20:21) (18 - 19)  SpO2: 100% (16 Jan 2022 20:21) (98% - 100%)        Constitutional: awake and alert.  HEENT: PERRLA, EOMI,   Neck: Supple.  Respiratory: Breath sounds are clear bilaterally  Cardiovascular: S1 and S2, regular / rhythm  Gastrointestinal: soft, nontender  Extremities:  no edema  Vascular: Carotid Bruit - no  Musculoskeletal: no joint swelling/tenderness, no abnormal movements  Skin: No rashes    Neurological exam:  HF: A x O x 3. Appropriately interactive, normal affect. Speech fluent, No Aphasia or paraphasic errors. Naming /repetition intact   CN: JACKIE, EOMI, VFF, facial sensation normal, no NLFD, tongue midline, Palate moves equally, SCM equal bilaterally  Motor: Rigidity without pronator drift, Strength 5/5 in all 4 ext, normal bulk and tone, mild tremor, rigidity and bradykinesia.    Sens: Intact to light touch / PP/ VS/ JS    Reflexes : Symmetric and normal . BJ 2+, BR 2+, KJ 2+, AJ 2+, downgoing toes b/l  Coord:  No FNFA, dysmetria, CRUZ intact   Gait/Balance: Narrow stance, short steps some shuffling    NIHSS:  0        Labs:                        11.4   11.93 )-----------( 253      ( 16 Jan 2022 07:18 )             35.5     01-16    140  |  109<H>  |  21<H>  ----------------------------<  117<H>  3.1<L>   |  23  |  0.84    Ca    7.8<L>      16 Jan 2022 07:18    TPro  7.1  /  Alb  3.3<L>  /  TBili  0.7  /  DBili  x   /  AST  26  /  ALT  9<L>  /  AlkPhos  69  01-15        PT/INR - ( 15 Tyler 2022 13:30 )   PT: 11.3 sec;   INR: 0.95 ratio         PTT - ( 15 Tyler 2022 13:30 )  PTT:24.9 sec    Radiology report:  - CT head:  < from: CT Head No Cont (01.15.22 @ 14:25) >  ACC: [id number] EXAM:  CT BRAIN                          PROCEDURE DATE:  01/15/2022          INTERPRETATION:  CT head without IV contrast    CLINICAL INFORMATION:  Fall   Intracranial hemorrhage.    TECHNIQUE: Contiguous axial 5 mm sections were obtained through the head.   Sagittal and coronal 2-D reformatted images were also obtained.   This   scan was performed using automatic exposure control (radiation dose   reduction software) to obtain a diagnostic image quality scan with   patient dose as low as reasonably achievable.    FINDINGS:   No previous examinations are available for review.    The brain demonstrates minimal anterior periventricular white matter   ischemia.   No acute cerebral cortical infarct is seen.  No intracranial   hemorrhage is found.  No mass effect is found in the brain.    The ventricles, sulci and basal cisterns appear unremarkable.    The orbits are unremarkable.  The paranasal sinuses are clear.  The nasal   cavity appears intact.  The nasopharynx is symmetric.  The central skull   base, petrous temporal bones and calvarium remain intact.      IMPRESSION:   Minimal anterior periventricular white matter ischemia is   noted.    --- End of Report ---    < end of copied text >    - MRI brain  - MRA head/carotids  - EEG       Patient is a 81y old  Female who presents with a chief complaint of Syncope (16 Jan 2022 18:35)    She reports several episodes of sudden loss of power in the lower extremity causing her to drop to the floor without losing consciousness.  HPI:    PAST MEDICAL & SURGICAL HISTORY:  Parkinson&#x27;s disease  dx 2012    Essential hypertension  currently not on medication    HLD (hyperlipidemia)    Osteoporosis    Hypothyroidism    Spondylolisthesis of lumbar region  s/p lumbar laminectomy with fusion in 3/2019    MARK positive    Edema of lower extremity  seen by PMD doppler done -as per patient normal    MVP (mitral valve prolapse)  pt reports asymptomatic, last Echo &gt;10 years ago    H/O Graves&#x27; disease  s/p thyroidectomy    OA (osteoarthritis)    History of thyroidectomy, total  1992    History of shoulder surgery  Right- long time ago    History of tonsillectomy  childhood    H/O colonoscopy  2018    S/P lumbar laminectomy  with fusion in 3/1/2019    Status post left hip replacement  5/15/2019        FAMILY HISTORY:  FH: kidney cancer  father    Family history of cerebral hemorrhage  mother    FH: lung cancer  sister    FH: heart disease  sister          Social Hx:  Nonsmoker, no drug or alcohol use    MEDICATIONS  (STANDING):  aspirin enteric coated 81 milliGRAM(s) Oral daily  atorvastatin 40 milliGRAM(s) Oral at bedtime  carbidopa/levodopa  25/100 1.5 Tablet(s) Oral every 8 hours  cholecalciferol 2000 Unit(s) Oral daily  enoxaparin Injectable 60 milliGRAM(s) SubCutaneous every 12 hours  levothyroxine 100 MICROGram(s) Oral daily  metoprolol tartrate 12.5 milliGRAM(s) Oral every 12 hours  multivitamin/minerals 1 Tablet(s) Oral daily  pantoprazole    Tablet 40 milliGRAM(s) Oral before breakfast  rasagiline Tablet 0.5 milliGRAM(s) Oral daily       Allergies    No Known Allergies    Intolerances        ROS: Pertinent positives in HPI, all other ROS were reviewed and are negative.      Vital Signs Last 24 Hrs  T(C): 36.8 (16 Jan 2022 20:21), Max: 36.8 (16 Jan 2022 05:12)  T(F): 98.3 (16 Jan 2022 20:21), Max: 98.3 (16 Jan 2022 05:12)  HR: 88 (16 Jan 2022 20:21) (72 - 88)  BP: 173/79 (16 Jan 2022 20:21) (102/68 - 183/86)  BP(mean): --  RR: 18 (16 Jan 2022 20:21) (18 - 19)  SpO2: 100% (16 Jan 2022 20:21) (98% - 100%)        Constitutional: awake and alert.  HEENT: PERRLA, EOMI,   Neck: Supple.  Respiratory: Breath sounds are clear bilaterally  Cardiovascular: S1 and S2, regular / rhythm  Gastrointestinal: soft, nontender  Extremities:  no edema  Vascular: Carotid Bruit - no  Musculoskeletal: no joint swelling/tenderness, no abnormal movements  Skin: No rashes    Neurological exam:  HF: A x O x 3. Appropriately interactive, normal affect. Speech fluent, No Aphasia or paraphasic errors. Naming /repetition intact   CN: JACKIE, EOMI, VFF, facial sensation normal, no NLFD, tongue midline, Palate moves equally, SCM equal bilaterally  Motor: Rigidity without pronator drift, Strength 5/5 in all 4 ext, normal bulk and cogwheel rigidity tone, mild tremor, rigidity and bradykinesia.    Sens: Intact to light touch / PP/ VS/ JS    Reflexes : Symmetric and normal . BJ 2+, BR 2+, KJ 2+, AJ 2+, downgoing toes b/l  Coord:  No FNFA, dysmetria, CRUZ intact   Gait/Balance: Narrow stance, short steps some shuffling    NIHSS:  0        Labs:                        11.4   11.93 )-----------( 253      ( 16 Jan 2022 07:18 )             35.5     01-16    140  |  109<H>  |  21<H>  ----------------------------<  117<H>  3.1<L>   |  23  |  0.84    Ca    7.8<L>      16 Jan 2022 07:18    TPro  7.1  /  Alb  3.3<L>  /  TBili  0.7  /  DBili  x   /  AST  26  /  ALT  9<L>  /  AlkPhos  69  01-15        PT/INR - ( 15 Tyler 2022 13:30 )   PT: 11.3 sec;   INR: 0.95 ratio         PTT - ( 15 Tyler 2022 13:30 )  PTT:24.9 sec    Radiology report:  - CT head:  < from: CT Head No Cont (01.15.22 @ 14:25) >  ACC: [id number] EXAM:  CT BRAIN                          PROCEDURE DATE:  01/15/2022          INTERPRETATION:  CT head without IV contrast    CLINICAL INFORMATION:  Fall   Intracranial hemorrhage.    TECHNIQUE: Contiguous axial 5 mm sections were obtained through the head.   Sagittal and coronal 2-D reformatted images were also obtained.   This   scan was performed using automatic exposure control (radiation dose   reduction software) to obtain a diagnostic image quality scan with   patient dose as low as reasonably achievable.    FINDINGS:   No previous examinations are available for review.    The brain demonstrates minimal anterior periventricular white matter   ischemia.   No acute cerebral cortical infarct is seen.  No intracranial   hemorrhage is found.  No mass effect is found in the brain.    The ventricles, sulci and basal cisterns appear unremarkable.    The orbits are unremarkable.  The paranasal sinuses are clear.  The nasal   cavity appears intact.  The nasopharynx is symmetric.  The central skull   base, petrous temporal bones and calvarium remain intact.      IMPRESSION:   Minimal anterior periventricular white matter ischemia is   noted.    --- End of Report ---    < end of copied text >

## 2022-01-16 NOTE — CONSULT NOTE ADULT - ASSESSMENT
Continue current medication for PD.   A/P:    - No IV tpa given because of low NIH score and time of onset uncrear  - ASA/ PLavix  - Statin  - Dysphagia screen  - DVT prophylaxia  - MRI/A brain-carotids  - PT eval  - Speech/swallow eval    Total Critical Care Time spent: Continue current medication for PD.   A/P:  Transient Ischemic Attack involving posterior circulation producing drop attacks; alternatively she may have orthostatic hypotension due to PD  Recommend:   Maintain orthostatic BP/P record daily  - No IV tpa given because of low NIH score and time of onset unclear  - ASA/ PLavix  - Statin  - Dysphagia screen  - DVT prophylaxia  - MRI/A brain  - doppler carotids  - PT eval  - Speech/swallow eval    Total Critical Care Time spent:

## 2022-01-16 NOTE — CONSULT NOTE ADULT - ASSESSMENT
78 y/o female with HTN, HLD, hypothyroidism (S/p total thyroidectomy), Parkinson's Disease, avascular necrosis of bilateral hips (s/p intraarticular steroid injections), OA (s/p left THR 5/2019, R THR 3/2/20), s/p lumbar laminectomy with fusion on 3/1/19,  and s/p L3-4 right hemilaminotomy and discectomy 1/18/2020 was BIBEMS s/p syncopal episode sustained in her home.     In ED  ABG 7.48/26/396/19 (metabolic acidosis w/ respiratory compensation)  CTPA negative for PE   CTH with minimal white matter ischemia and no acute CVA/bleed  EKG showing AFib RVR   Troponins elevated    Problem/Plan - 1:  ·  Problem: Syncope.   ·  Plan: Likely multifactorial  Pt had classic prodrome for vasovagal syncope, high suspicion for cardiac syncope (new onset afib), orthostatic hypotension (PD on treatment), and drug induced (inc doses of THC/CBD)  lactate elevated w/o signs of infection or SIRS, will hold Abx for now   C/w IVF  Likely syncope 2/2 rapid AF    Problem/Plan - 2:  ·  Problem: Atrial fibrillation.   ·  Plan: New onset Afib RVR   Rate was > 140, and the improved to < 110 s/p IVF bolus   No obvious signs of fluid overload, no chest pain or Hypotension  Started on rate control with Lopressor 12.5mg PO BID, and IVP 5mg Q6h for HR> 140bpm  Lactate 4.3,       Problem/Plan - 3:  ·  Problem: Troponin level elevated.  ·  Plan: Pt w/o Chest pain  EKG Afib RVR with no ST changes   Trop [id number]  \\Type 2 MI demand 2/2 rapid AF    Problem/Plan - 4:  ·  Problem: Parkinsons disease.  ·  Plan: Patient has reports that her tremor and gait symptoms are well controlled  C/w home dose of CD/LD, rasagiline for now (confirm with pharmacy)  F/u orthostatic BP as can be Parkinson + syndrome.    Problem/Plan - 5:  ·  Problem: Back pain, chronic.  ·  Plan: S/p multiple ortho procedures including laminectomy and B/L THR   Pt previously on gabapentin and oxycodone, and on medical marijuana (recently inc her own dosage)  Pain Mng consult.    Problem/Plan - 6:  ·  Problem: Hypothyroidism.  ·  Plan: Pt on 100mcg x 6 days and 50mcg x 1 day   F/u TFTs, may be inc her own dosage resulting in Afib.    Problem/Plan - 7:  ·  Problem: Osteoporosis.  ·  Plan: Pt on Fosamax 70mg Qweekly   Will hold inpatient  Fall precautions   vitamin D supplementation, F/u vitamin D.    Problem/Plan - 8:  ·  Problem: Prophylactic measure.   ·  Plan: Lovenox 60mg BID   Protonix 40mg Qd   IMPROVE VTE Individual Risk Assessment  RISK                                                                Points  [  ] Previous VTE                                                  3  [x  ] Thrombophilia                                               2  [  ] Lower limb paralysis                                      2        (unable to hold up >15 seconds)    [  ] Current Cancer                                              2         (within 6 months)  [x  ] Immobilization > 24 hrs                                1  [  ] ICU/CCU stay > 24 hours                              1  [ x ] Age > 60                                                      1  IMPROVE VTE Score _____4____.

## 2022-01-16 NOTE — CONSULT NOTE ADULT - TIME BILLING
- Review of records, telemetry, vital signs and daily labs.   - General and cardiovascular physical examination.  - Generation of cardiovascular treatment plan.  - Coordination of care.      Patient was seen and examined by me on 01/16/2022,interim events noted,labs and radiology studies reviewed.  Juvenal Jiménez MD,FACC.  [address] [place], [place] [id number].  [phone]

## 2022-01-17 LAB
ANION GAP SERPL CALC-SCNC: 6 MMOL/L — SIGNIFICANT CHANGE UP (ref 5–17)
BUN SERPL-MCNC: 19 MG/DL — HIGH (ref 7–18)
CALCIUM SERPL-MCNC: 9.3 MG/DL — SIGNIFICANT CHANGE UP (ref 8.4–10.5)
CHLORIDE SERPL-SCNC: 106 MMOL/L — SIGNIFICANT CHANGE UP (ref 96–108)
CO2 SERPL-SCNC: 28 MMOL/L — SIGNIFICANT CHANGE UP (ref 22–31)
CREAT SERPL-MCNC: 0.7 MG/DL — SIGNIFICANT CHANGE UP (ref 0.5–1.3)
CULTURE RESULTS: SIGNIFICANT CHANGE UP
GLUCOSE SERPL-MCNC: 119 MG/DL — HIGH (ref 70–99)
HCT VFR BLD CALC: 36.9 % — SIGNIFICANT CHANGE UP (ref 34.5–45)
HGB BLD-MCNC: 11.9 G/DL — SIGNIFICANT CHANGE UP (ref 11.5–15.5)
MCHC RBC-ENTMCNC: 27.9 PG — SIGNIFICANT CHANGE UP (ref 27–34)
MCHC RBC-ENTMCNC: 32.2 GM/DL — SIGNIFICANT CHANGE UP (ref 32–36)
MCV RBC AUTO: 86.4 FL — SIGNIFICANT CHANGE UP (ref 80–100)
NRBC # BLD: 0 /100 WBCS — SIGNIFICANT CHANGE UP (ref 0–0)
PLATELET # BLD AUTO: 230 K/UL — SIGNIFICANT CHANGE UP (ref 150–400)
POTASSIUM SERPL-MCNC: 4.2 MMOL/L — SIGNIFICANT CHANGE UP (ref 3.5–5.3)
POTASSIUM SERPL-SCNC: 4.2 MMOL/L — SIGNIFICANT CHANGE UP (ref 3.5–5.3)
RBC # BLD: 4.27 M/UL — SIGNIFICANT CHANGE UP (ref 3.8–5.2)
RBC # FLD: 17.1 % — HIGH (ref 10.3–14.5)
SODIUM SERPL-SCNC: 140 MMOL/L — SIGNIFICANT CHANGE UP (ref 135–145)
SPECIMEN SOURCE: SIGNIFICANT CHANGE UP
VIT D25+D1,25 OH+D1,25 PNL SERPL-MCNC: 166 PG/ML — HIGH (ref 19.9–79.3)
WBC # BLD: 17.69 K/UL — HIGH (ref 3.8–10.5)
WBC # FLD AUTO: 17.69 K/UL — HIGH (ref 3.8–10.5)

## 2022-01-17 RX ADMIN — Medication 2000 UNIT(S): at 12:15

## 2022-01-17 RX ADMIN — RASAGILINE 0.5 MILLIGRAM(S): 0.5 TABLET ORAL at 12:15

## 2022-01-17 RX ADMIN — CARBIDOPA AND LEVODOPA 1.5 TABLET(S): 25; 100 TABLET ORAL at 13:30

## 2022-01-17 RX ADMIN — ENOXAPARIN SODIUM 60 MILLIGRAM(S): 100 INJECTION SUBCUTANEOUS at 05:12

## 2022-01-17 RX ADMIN — Medication 3 MILLIGRAM(S): at 21:46

## 2022-01-17 RX ADMIN — Medication 12.5 MILLIGRAM(S): at 17:29

## 2022-01-17 RX ADMIN — Medication 100 MICROGRAM(S): at 05:12

## 2022-01-17 RX ADMIN — Medication 1 TABLET(S): at 12:15

## 2022-01-17 RX ADMIN — Medication 12.5 MILLIGRAM(S): at 05:12

## 2022-01-17 RX ADMIN — PANTOPRAZOLE SODIUM 40 MILLIGRAM(S): 20 TABLET, DELAYED RELEASE ORAL at 05:13

## 2022-01-17 RX ADMIN — Medication 81 MILLIGRAM(S): at 12:15

## 2022-01-17 RX ADMIN — ENOXAPARIN SODIUM 60 MILLIGRAM(S): 100 INJECTION SUBCUTANEOUS at 17:29

## 2022-01-17 RX ADMIN — CARBIDOPA AND LEVODOPA 1.5 TABLET(S): 25; 100 TABLET ORAL at 06:55

## 2022-01-17 RX ADMIN — ATORVASTATIN CALCIUM 40 MILLIGRAM(S): 80 TABLET, FILM COATED ORAL at 21:36

## 2022-01-17 RX ADMIN — CARBIDOPA AND LEVODOPA 1.5 TABLET(S): 25; 100 TABLET ORAL at 21:35

## 2022-01-17 RX ADMIN — Medication 650 MILLIGRAM(S): at 00:15

## 2022-01-17 NOTE — PHYSICAL THERAPY INITIAL EVALUATION ADULT - GENERAL OBSERVATIONS, REHAB EVAL
Pt seen sitting in chair with telemetry, denied any c/o pain/discomfort, was cooperative during assessment

## 2022-01-17 NOTE — PHYSICAL THERAPY INITIAL EVALUATION ADULT - AMBULATION SKILLS, REHAB EVAL
st cane for outdoor ambulation; no AD when indoor. Pt also owns a rollator/RW/independent/needs device

## 2022-01-18 LAB
ALBUMIN SERPL ELPH-MCNC: 2.7 G/DL — LOW (ref 3.5–5)
ALP SERPL-CCNC: 81 U/L — SIGNIFICANT CHANGE UP (ref 40–120)
ALT FLD-CCNC: 7 U/L DA — LOW (ref 10–60)
ANION GAP SERPL CALC-SCNC: 9 MMOL/L — SIGNIFICANT CHANGE UP (ref 5–17)
AST SERPL-CCNC: 51 U/L — HIGH (ref 10–40)
BILIRUB SERPL-MCNC: 0.8 MG/DL — SIGNIFICANT CHANGE UP (ref 0.2–1.2)
BUN SERPL-MCNC: 21 MG/DL — HIGH (ref 7–18)
CALCIUM SERPL-MCNC: 8.9 MG/DL — SIGNIFICANT CHANGE UP (ref 8.4–10.5)
CHLORIDE SERPL-SCNC: 104 MMOL/L — SIGNIFICANT CHANGE UP (ref 96–108)
CO2 SERPL-SCNC: 25 MMOL/L — SIGNIFICANT CHANGE UP (ref 22–31)
CREAT SERPL-MCNC: 0.6 MG/DL — SIGNIFICANT CHANGE UP (ref 0.5–1.3)
GLUCOSE SERPL-MCNC: 119 MG/DL — HIGH (ref 70–99)
HCT VFR BLD CALC: 33.2 % — LOW (ref 34.5–45)
HGB BLD-MCNC: 10.8 G/DL — LOW (ref 11.5–15.5)
MAGNESIUM SERPL-MCNC: 1.8 MG/DL — SIGNIFICANT CHANGE UP (ref 1.6–2.6)
MCHC RBC-ENTMCNC: 27.8 PG — SIGNIFICANT CHANGE UP (ref 27–34)
MCHC RBC-ENTMCNC: 32.5 GM/DL — SIGNIFICANT CHANGE UP (ref 32–36)
MCV RBC AUTO: 85.3 FL — SIGNIFICANT CHANGE UP (ref 80–100)
NRBC # BLD: 0 /100 WBCS — SIGNIFICANT CHANGE UP (ref 0–0)
PHOSPHATE SERPL-MCNC: 2.1 MG/DL — LOW (ref 2.5–4.5)
PLATELET # BLD AUTO: 211 K/UL — SIGNIFICANT CHANGE UP (ref 150–400)
POTASSIUM SERPL-MCNC: 3.5 MMOL/L — SIGNIFICANT CHANGE UP (ref 3.5–5.3)
POTASSIUM SERPL-SCNC: 3.5 MMOL/L — SIGNIFICANT CHANGE UP (ref 3.5–5.3)
PROT SERPL-MCNC: 6.3 G/DL — SIGNIFICANT CHANGE UP (ref 6–8.3)
RBC # BLD: 3.89 M/UL — SIGNIFICANT CHANGE UP (ref 3.8–5.2)
RBC # FLD: 17.2 % — HIGH (ref 10.3–14.5)
SODIUM SERPL-SCNC: 138 MMOL/L — SIGNIFICANT CHANGE UP (ref 135–145)
WBC # BLD: 13.31 K/UL — HIGH (ref 3.8–10.5)
WBC # FLD AUTO: 13.31 K/UL — HIGH (ref 3.8–10.5)

## 2022-01-18 PROCEDURE — 93016 CV STRESS TEST SUPVJ ONLY: CPT

## 2022-01-18 PROCEDURE — 99232 SBSQ HOSP IP/OBS MODERATE 35: CPT

## 2022-01-18 PROCEDURE — 78452 HT MUSCLE IMAGE SPECT MULT: CPT | Mod: 26

## 2022-01-18 PROCEDURE — 93018 CV STRESS TEST I&R ONLY: CPT

## 2022-01-18 PROCEDURE — 70551 MRI BRAIN STEM W/O DYE: CPT | Mod: 26

## 2022-01-18 PROCEDURE — 70544 MR ANGIOGRAPHY HEAD W/O DYE: CPT | Mod: 26,59

## 2022-01-18 RX ORDER — MAGNESIUM SULFATE 500 MG/ML
2 VIAL (ML) INJECTION ONCE
Refills: 0 | Status: COMPLETED | OUTPATIENT
Start: 2022-01-18 | End: 2022-01-18

## 2022-01-18 RX ORDER — SODIUM CHLORIDE 9 MG/ML
1000 INJECTION INTRAMUSCULAR; INTRAVENOUS; SUBCUTANEOUS ONCE
Refills: 0 | Status: DISCONTINUED | OUTPATIENT
Start: 2022-01-18 | End: 2022-01-18

## 2022-01-18 RX ORDER — POTASSIUM PHOSPHATE, MONOBASIC POTASSIUM PHOSPHATE, DIBASIC 236; 224 MG/ML; MG/ML
30 INJECTION, SOLUTION INTRAVENOUS ONCE
Refills: 0 | Status: COMPLETED | OUTPATIENT
Start: 2022-01-18 | End: 2022-01-18

## 2022-01-18 RX ADMIN — CARBIDOPA AND LEVODOPA 1.5 TABLET(S): 25; 100 TABLET ORAL at 13:08

## 2022-01-18 RX ADMIN — POTASSIUM PHOSPHATE, MONOBASIC POTASSIUM PHOSPHATE, DIBASIC 83.33 MILLIMOLE(S): 236; 224 INJECTION, SOLUTION INTRAVENOUS at 10:43

## 2022-01-18 RX ADMIN — Medication 100 MICROGRAM(S): at 05:43

## 2022-01-18 RX ADMIN — ATORVASTATIN CALCIUM 40 MILLIGRAM(S): 80 TABLET, FILM COATED ORAL at 21:21

## 2022-01-18 RX ADMIN — ENOXAPARIN SODIUM 60 MILLIGRAM(S): 100 INJECTION SUBCUTANEOUS at 05:43

## 2022-01-18 RX ADMIN — Medication 650 MILLIGRAM(S): at 23:43

## 2022-01-18 RX ADMIN — CARBIDOPA AND LEVODOPA 1.5 TABLET(S): 25; 100 TABLET ORAL at 05:42

## 2022-01-18 RX ADMIN — RASAGILINE 0.5 MILLIGRAM(S): 0.5 TABLET ORAL at 11:11

## 2022-01-18 RX ADMIN — Medication 3 MILLIGRAM(S): at 21:21

## 2022-01-18 RX ADMIN — CARBIDOPA AND LEVODOPA 1.5 TABLET(S): 25; 100 TABLET ORAL at 21:20

## 2022-01-18 RX ADMIN — Medication 2000 UNIT(S): at 11:11

## 2022-01-18 RX ADMIN — Medication 25 GRAM(S): at 21:25

## 2022-01-18 RX ADMIN — ENOXAPARIN SODIUM 60 MILLIGRAM(S): 100 INJECTION SUBCUTANEOUS at 17:45

## 2022-01-18 RX ADMIN — Medication 12.5 MILLIGRAM(S): at 05:42

## 2022-01-18 RX ADMIN — Medication 1 TABLET(S): at 11:11

## 2022-01-18 RX ADMIN — PANTOPRAZOLE SODIUM 40 MILLIGRAM(S): 20 TABLET, DELAYED RELEASE ORAL at 05:43

## 2022-01-18 RX ADMIN — Medication 12.5 MILLIGRAM(S): at 17:45

## 2022-01-18 RX ADMIN — Medication 81 MILLIGRAM(S): at 11:11

## 2022-01-19 ENCOUNTER — TRANSCRIPTION ENCOUNTER (OUTPATIENT)
Age: 82
End: 2022-01-19

## 2022-01-19 VITALS — SYSTOLIC BLOOD PRESSURE: 179 MMHG | DIASTOLIC BLOOD PRESSURE: 76 MMHG

## 2022-01-19 LAB
ALBUMIN SERPL ELPH-MCNC: 2.3 G/DL — LOW (ref 3.5–5)
ALP SERPL-CCNC: 72 U/L — SIGNIFICANT CHANGE UP (ref 40–120)
ALT FLD-CCNC: 8 U/L DA — LOW (ref 10–60)
ANION GAP SERPL CALC-SCNC: 5 MMOL/L — SIGNIFICANT CHANGE UP (ref 5–17)
AST SERPL-CCNC: 32 U/L — SIGNIFICANT CHANGE UP (ref 10–40)
BILIRUB SERPL-MCNC: 0.4 MG/DL — SIGNIFICANT CHANGE UP (ref 0.2–1.2)
BUN SERPL-MCNC: 17 MG/DL — SIGNIFICANT CHANGE UP (ref 7–18)
CALCIUM SERPL-MCNC: 9.1 MG/DL — SIGNIFICANT CHANGE UP (ref 8.4–10.5)
CHLORIDE SERPL-SCNC: 105 MMOL/L — SIGNIFICANT CHANGE UP (ref 96–108)
CK MB BLD-MCNC: 5 % — HIGH (ref 0–3.5)
CK MB CFR SERPL CALC: 3.9 NG/ML — HIGH (ref 0–3.6)
CK SERPL-CCNC: 78 U/L — SIGNIFICANT CHANGE UP (ref 21–215)
CO2 SERPL-SCNC: 29 MMOL/L — SIGNIFICANT CHANGE UP (ref 22–31)
CREAT SERPL-MCNC: 0.69 MG/DL — SIGNIFICANT CHANGE UP (ref 0.5–1.3)
GLUCOSE SERPL-MCNC: 99 MG/DL — SIGNIFICANT CHANGE UP (ref 70–99)
HCT VFR BLD CALC: 35.3 % — SIGNIFICANT CHANGE UP (ref 34.5–45)
HGB BLD-MCNC: 11.1 G/DL — LOW (ref 11.5–15.5)
LACTATE SERPL-SCNC: 1.3 MMOL/L — SIGNIFICANT CHANGE UP (ref 0.7–2)
MAGNESIUM SERPL-MCNC: 2.2 MG/DL — SIGNIFICANT CHANGE UP (ref 1.6–2.6)
MCHC RBC-ENTMCNC: 27.8 PG — SIGNIFICANT CHANGE UP (ref 27–34)
MCHC RBC-ENTMCNC: 31.4 GM/DL — LOW (ref 32–36)
MCV RBC AUTO: 88.5 FL — SIGNIFICANT CHANGE UP (ref 80–100)
NRBC # BLD: 0 /100 WBCS — SIGNIFICANT CHANGE UP (ref 0–0)
PHOSPHATE SERPL-MCNC: 2.9 MG/DL — SIGNIFICANT CHANGE UP (ref 2.5–4.5)
PLATELET # BLD AUTO: 254 K/UL — SIGNIFICANT CHANGE UP (ref 150–400)
POTASSIUM SERPL-MCNC: 4.2 MMOL/L — SIGNIFICANT CHANGE UP (ref 3.5–5.3)
POTASSIUM SERPL-SCNC: 4.2 MMOL/L — SIGNIFICANT CHANGE UP (ref 3.5–5.3)
PROT SERPL-MCNC: 6.2 G/DL — SIGNIFICANT CHANGE UP (ref 6–8.3)
RBC # BLD: 3.99 M/UL — SIGNIFICANT CHANGE UP (ref 3.8–5.2)
RBC # FLD: 17.3 % — HIGH (ref 10.3–14.5)
SODIUM SERPL-SCNC: 139 MMOL/L — SIGNIFICANT CHANGE UP (ref 135–145)
TROPONIN I, HIGH SENSITIVITY RESULT: 868.9 NG/L — HIGH
WBC # BLD: 9.8 K/UL — SIGNIFICANT CHANGE UP (ref 3.8–10.5)
WBC # FLD AUTO: 9.8 K/UL — SIGNIFICANT CHANGE UP (ref 3.8–10.5)

## 2022-01-19 PROCEDURE — 83735 ASSAY OF MAGNESIUM: CPT

## 2022-01-19 PROCEDURE — 86850 RBC ANTIBODY SCREEN: CPT

## 2022-01-19 PROCEDURE — 80053 COMPREHEN METABOLIC PANEL: CPT

## 2022-01-19 PROCEDURE — 83605 ASSAY OF LACTIC ACID: CPT

## 2022-01-19 PROCEDURE — 81001 URINALYSIS AUTO W/SCOPE: CPT

## 2022-01-19 PROCEDURE — 70450 CT HEAD/BRAIN W/O DYE: CPT | Mod: MA

## 2022-01-19 PROCEDURE — 86900 BLOOD TYPING SEROLOGIC ABO: CPT

## 2022-01-19 PROCEDURE — 0225U NFCT DS DNA&RNA 21 SARSCOV2: CPT

## 2022-01-19 PROCEDURE — 84436 ASSAY OF TOTAL THYROXINE: CPT

## 2022-01-19 PROCEDURE — 85379 FIBRIN DEGRADATION QUANT: CPT

## 2022-01-19 PROCEDURE — 78452 HT MUSCLE IMAGE SPECT MULT: CPT

## 2022-01-19 PROCEDURE — 93306 TTE W/DOPPLER COMPLETE: CPT

## 2022-01-19 PROCEDURE — 83880 ASSAY OF NATRIURETIC PEPTIDE: CPT

## 2022-01-19 PROCEDURE — 87086 URINE CULTURE/COLONY COUNT: CPT

## 2022-01-19 PROCEDURE — 82962 GLUCOSE BLOOD TEST: CPT

## 2022-01-19 PROCEDURE — 87040 BLOOD CULTURE FOR BACTERIA: CPT

## 2022-01-19 PROCEDURE — 36415 COLL VENOUS BLD VENIPUNCTURE: CPT

## 2022-01-19 PROCEDURE — 80048 BASIC METABOLIC PNL TOTAL CA: CPT

## 2022-01-19 PROCEDURE — 71275 CT ANGIOGRAPHY CHEST: CPT | Mod: MA

## 2022-01-19 PROCEDURE — 84100 ASSAY OF PHOSPHORUS: CPT

## 2022-01-19 PROCEDURE — 84484 ASSAY OF TROPONIN QUANT: CPT

## 2022-01-19 PROCEDURE — 86901 BLOOD TYPING SEROLOGIC RH(D): CPT

## 2022-01-19 PROCEDURE — 99291 CRITICAL CARE FIRST HOUR: CPT | Mod: 25

## 2022-01-19 PROCEDURE — 85730 THROMBOPLASTIN TIME PARTIAL: CPT

## 2022-01-19 PROCEDURE — A9502: CPT

## 2022-01-19 PROCEDURE — 93005 ELECTROCARDIOGRAM TRACING: CPT

## 2022-01-19 PROCEDURE — 82803 BLOOD GASES ANY COMBINATION: CPT

## 2022-01-19 PROCEDURE — 70544 MR ANGIOGRAPHY HEAD W/O DYE: CPT

## 2022-01-19 PROCEDURE — 70551 MRI BRAIN STEM W/O DYE: CPT

## 2022-01-19 PROCEDURE — 84480 ASSAY TRIIODOTHYRONINE (T3): CPT

## 2022-01-19 PROCEDURE — 93017 CV STRESS TEST TRACING ONLY: CPT

## 2022-01-19 PROCEDURE — 85025 COMPLETE CBC W/AUTO DIFF WBC: CPT

## 2022-01-19 PROCEDURE — 82553 CREATINE MB FRACTION: CPT

## 2022-01-19 PROCEDURE — 82550 ASSAY OF CK (CPK): CPT

## 2022-01-19 PROCEDURE — 82652 VIT D 1 25-DIHYDROXY: CPT

## 2022-01-19 PROCEDURE — 99292 CRITICAL CARE ADDL 30 MIN: CPT

## 2022-01-19 PROCEDURE — 97162 PT EVAL MOD COMPLEX 30 MIN: CPT

## 2022-01-19 PROCEDURE — 84443 ASSAY THYROID STIM HORMONE: CPT

## 2022-01-19 PROCEDURE — 85027 COMPLETE CBC AUTOMATED: CPT

## 2022-01-19 PROCEDURE — 85610 PROTHROMBIN TIME: CPT

## 2022-01-19 RX ORDER — METOPROLOL TARTRATE 50 MG
1 TABLET ORAL
Qty: 60 | Refills: 0
Start: 2022-01-19 | End: 2022-02-17

## 2022-01-19 RX ORDER — APIXABAN 2.5 MG/1
1 TABLET, FILM COATED ORAL
Qty: 60 | Refills: 0
Start: 2022-01-19 | End: 2022-02-17

## 2022-01-19 RX ADMIN — Medication 12.5 MILLIGRAM(S): at 05:36

## 2022-01-19 RX ADMIN — Medication 2000 UNIT(S): at 11:19

## 2022-01-19 RX ADMIN — RASAGILINE 0.5 MILLIGRAM(S): 0.5 TABLET ORAL at 11:19

## 2022-01-19 RX ADMIN — CARBIDOPA AND LEVODOPA 1.5 TABLET(S): 25; 100 TABLET ORAL at 14:37

## 2022-01-19 RX ADMIN — CARBIDOPA AND LEVODOPA 1.5 TABLET(S): 25; 100 TABLET ORAL at 05:35

## 2022-01-19 RX ADMIN — Medication 100 MICROGRAM(S): at 05:43

## 2022-01-19 RX ADMIN — Medication 1 TABLET(S): at 11:19

## 2022-01-19 RX ADMIN — PANTOPRAZOLE SODIUM 40 MILLIGRAM(S): 20 TABLET, DELAYED RELEASE ORAL at 05:45

## 2022-01-19 RX ADMIN — Medication 650 MILLIGRAM(S): at 00:25

## 2022-01-19 RX ADMIN — Medication 81 MILLIGRAM(S): at 11:19

## 2022-01-19 RX ADMIN — ENOXAPARIN SODIUM 60 MILLIGRAM(S): 100 INJECTION SUBCUTANEOUS at 05:55

## 2022-01-19 RX ADMIN — ENOXAPARIN SODIUM 60 MILLIGRAM(S): 100 INJECTION SUBCUTANEOUS at 17:06

## 2022-01-19 RX ADMIN — Medication 12.5 MILLIGRAM(S): at 17:06

## 2022-01-19 NOTE — DISCHARGE NOTE PROVIDER - NSDCFUSCHEDAPPT_GEN_ALL_CORE_FT
RENETTA STALEY ; 03/03/2022 ; [facility] [address] [facility]  RENETTA STALEY ; 03/11/2022 ; [facility] [phone] [facility]  RENETTA STALEY ; 03/11/2022 ; [facility] [phone] [facility]

## 2022-01-19 NOTE — PROGRESS NOTE ADULT - SUBJECTIVE AND OBJECTIVE BOX
PGY-1 Progress Note discussed with attending    PAGER #: [[phone]] TILL 5:00 PM  PLEASE CONTACT ON CALL TEAM:  - On Call Team (Please refer to Kendrick) FROM 5:00 PM - 8:30PM  - Nightfloat Team FROM 8:30 -7:30 AM    INTERVAL HPI/ OVERNIGHT EVENTS: No acute events overnight.,       REVIEW OF SYSTEMS:  CONSTITUTIONAL: No fever, weight loss, or fatigue  RESPIRATORY: No cough, wheezing, chills or hemoptysis; No shortness of breath  CARDIOVASCULAR: No chest pain, palpitations, dizziness, or leg swelling  GASTROINTESTINAL: No abdominal pain. No nausea, vomiting, or hematemesis; No diarrhea or constipation. No melena or hematochezia.  GENITOURINARY: No dysuria or hematuria, urinary frequency  NEUROLOGICAL: No headaches, memory loss, loss of strength, numbness, or tremors  SKIN: No itching, burning, rashes, or lesions     MEDICATIONS  (STANDING):  aspirin enteric coated 81 milliGRAM(s) Oral daily  atorvastatin 40 milliGRAM(s) Oral at bedtime  carbidopa/levodopa  25/100 1.5 Tablet(s) Oral every 8 hours  cholecalciferol 2000 Unit(s) Oral daily  enoxaparin Injectable 60 milliGRAM(s) SubCutaneous every 12 hours  levothyroxine 100 MICROGram(s) Oral daily  metoprolol tartrate 12.5 milliGRAM(s) Oral every 12 hours  multivitamin/minerals 1 Tablet(s) Oral daily  pantoprazole    Tablet 40 milliGRAM(s) Oral before breakfast  rasagiline Tablet 0.5 milliGRAM(s) Oral daily    MEDICATIONS  (PRN):  acetaminophen     Tablet .. 650 milliGRAM(s) Oral every 6 hours PRN Temp greater or equal to 38C (100.4F), Mild Pain (1 - 3)  aluminum hydroxide/magnesium hydroxide/simethicone Suspension 30 milliLiter(s) Oral every 4 hours PRN Dyspepsia  melatonin 3 milliGRAM(s) Oral at bedtime PRN Insomnia  metoprolol tartrate Injectable 5 milliGRAM(s) IV Push every 6 hours PRN HR> 140  ondansetron Injectable 4 milliGRAM(s) IV Push every 8 hours PRN Nausea and/or Vomiting      Vital Signs Last 24 Hrs  T(C): 36.4 (17 Jan 2022 07:58), Max: 37.1 (16 Jan 2022 23:42)  T(F): 97.5 (17 Jan 2022 07:58), Max: 98.7 (16 Jan 2022 23:42)  HR: 88 (17 Jan 2022 07:58) (73 - 88)  BP: 166/93 (17 Jan 2022 07:58) (122/55 - 183/86)  BP(mean): --  RR: 18 (17 Jan 2022 07:58) (17 - 18)  SpO2: 98% (17 Jan 2022 07:58) (94% - 100%)    PHYSICAL EXAMINATION:  GENERAL: NAD, AAOx3  HEAD: AT/NC  EYES: conjunctiva and sclera clear  NECK: supple, No JVD noted, Normal thyroid  CHEST/LUNG: CTABL; no rales, rhonchi, wheezing, or rubs  HEART: regular rate and rhythm; no murmurs, rubs, or gallops  ABDOMEN: soft, nontender, nondistended; Bowel sounds present  EXTREMITIES:  2+ Peripheral Pulses, No clubbing, cyanosis, or edema  SKIN: warm dry                          11.9   17.69 )-----------( 230      ( 17 Jan 2022 08:21 )             36.9     01-17    140  |  106  |  19<H>  ----------------------------<  119<H>  4.2   |  28  |  0.70    Ca    9.3      17 Jan 2022 08:21    TPro  7.1  /  Alb  3.3<L>  /  TBili  0.7  /  DBili  x   /  AST  26  /  ALT  9<L>  /  AlkPhos  69  01-15    LIVER FUNCTIONS - ( 15 Tyler 2022 13:30 )  Alb: 3.3 g/dL / Pro: 7.1 g/dL / ALK PHOS: 69 U/L / ALT: 9 U/L DA / AST: 26 U/L / GGT: x               PT/INR - ( 15 Tyler 2022 13:30 )   PT: 11.3 sec;   INR: 0.95 ratio         PTT - ( 15 Tyler 2022 13:30 )  PTT:24.9 sec  SARS-CoV-2: NotDetec (15 Tyler 2022 13:30)      CAPILLARY BLOOD GLUCOSE          RADIOLOGY & ADDITIONAL TESTS:                  
INTERVAL HPI/OVERNIGHT EVENTS:  No new complaints  HEALTH ISSUES - PROBLEM Dx:  Syncope    Unspecified atrial fibrillation    Parkinsons disease    Back pain, chronic    Hypothyroidism    Osteoporosis    Prophylactic measure    Troponin level elevated            MEDICATIONS  (STANDING):  aspirin enteric coated 81 milliGRAM(s) Oral daily  atorvastatin 40 milliGRAM(s) Oral at bedtime  carbidopa/levodopa  25/100 1.5 Tablet(s) Oral every 8 hours  cholecalciferol 2000 Unit(s) Oral daily  enoxaparin Injectable 60 milliGRAM(s) SubCutaneous every 12 hours  levothyroxine 100 MICROGram(s) Oral daily  metoprolol tartrate 12.5 milliGRAM(s) Oral every 12 hours  multivitamin/minerals 1 Tablet(s) Oral daily  pantoprazole    Tablet 40 milliGRAM(s) Oral before breakfast  rasagiline Tablet 0.5 milliGRAM(s) Oral daily    MEDICATIONS  (PRN):  acetaminophen     Tablet .. 650 milliGRAM(s) Oral every 6 hours PRN Temp greater or equal to 38C (100.4F), Mild Pain (1 - 3)  aluminum hydroxide/magnesium hydroxide/simethicone Suspension 30 milliLiter(s) Oral every 4 hours PRN Dyspepsia  melatonin 3 milliGRAM(s) Oral at bedtime PRN Insomnia  metoprolol tartrate Injectable 5 milliGRAM(s) IV Push every 6 hours PRN HR> 140  ondansetron Injectable 4 milliGRAM(s) IV Push every 8 hours PRN Nausea and/or Vomiting      Allergies    No Known Allergies    Intolerances        REVIEW OF SYSTEMS        CONSTITUTIONAL: No weakness, fatigue, malaise, fevers or chills, no weight change, appetite change  EYES: No visual changes; No double vision,  No vertigo, eye pain  Ears: no otalgia, no otorhea, no hearing loss, tinnitus  Nose: no epistaxis, rhinorrhea, post-discharge, sinus pressure  Throat: no throat pain, no oral lesions, tooth pain   NECK: No pain or stiffness  RESPIRATORY: No cough (productive or dry), wheezing, hemoptysis; No shortness of breath, orthnopnea, PND, VEGA, snoring,  CARDIOVASCULAR: No chest pain or palpitations, no leg edema, no claudication    GASTROINTESTINAL: No abdominal or epigastric pain. No nausea, vomiting, or hematemesis; No diarrhea or constipation. No melena or hematochezia.  GENITOURINARY: No dysuria, frequency, urgency or hematuria, no pelvic pain, urinary incontinence, urgency  Muscloskeletal: no joints or muscle pain, no swelling in joints or muscles  NEUROLOGICAL: No numbness or weakness, headache, memory loss, seizures, dizziness, vertigo, syncope, ataxia  SKIN: No pruritis, rashes, lesions or new moles  Psych: No anxiety, sadness, insomnia, suicide thoughts  Endocrine: No Heat or Cold intolerance, polydipsia, polyphagia  Heme/Lymph: no LN enlargement, no easy bruising or bleeding     Vital Signs Last 24 Hrs  T(C): 36.2 (18 Jan 2022 17:02), Max: 37.4 (17 Jan 2022 23:55)  T(F): 97.2 (18 Jan 2022 17:02), Max: 99.3 (17 Jan 2022 23:55)  HR: 84 (18 Jan 2022 17:02) (79 - 88)  BP: 112/64 (18 Jan 2022 17:02) (112/57 - 161/92)  BP(mean): --  RR: 18 (18 Jan 2022 17:02) (18 - 18)  SpO2: 98% (18 Jan 2022 17:02) (94% - 98%)    PHYSICAL EXAM:    Constitutional: awake and alert.  HEENT: PERRLA, EOMI,   Neck: Supple.  Respiratory: Breath sounds are clear bilaterally  Cardiovascular: S1 and S2, regular / rhythm  Gastrointestinal: soft, nontender  Extremities:  no edema  Vascular: Carotid Bruit - no  Musculoskeletal: no joint swelling/tenderness, no abnormal movements  Skin: No rashes    Neurological exam:  HF: A x O x 3. Appropriately interactive, normal affect. Speech fluent, No Aphasia or paraphasic errors. Naming /repetition intact   CN: JACKIE, EOMI, VFF, facial sensation normal, no NLFD, tongue midline, Palate moves equally, SCM equal bilaterally  Motor: Rigidity without pronator drift, Strength 5/5 in all 4 ext, normal bulk and cogwheel rigidity tone, mild tremor, rigidity and bradykinesia.    Sens: Intact to light touch / PP/ VS/ JS    Reflexes : Symmetric and normal . BJ 2+, BR 2+, KJ 2+, AJ 2+, downgoing toes b/l  Coord:  No FNFA, dysmetria, CRUZ intact   Gait/Balance: Narrow stance, short steps some shuffling    LABS:                        10.8   13.31 )-----------( 211      ( 18 Jan 2022 07:22 )             33.2     01-18    138  |  104  |  21<H>  ----------------------------<  119<H>  3.5   |  25  |  0.60    Ca    8.9      18 Jan 2022 07:22  Phos  2.1     01-18  Mg     1.8     01-18    TPro  6.3  /  Alb  2.7<L>  /  TBili  0.8  /  DBili  x   /  AST  51<H>  /  ALT  7<L>  /  AlkPhos  81  01-18          RADIOLOGY & ADDITIONAL TESTS:  < from: MR Head No Cont (01.18.22 @ 13:42) >    ACC: [id number] EXAM:  MR BRAIN                        ACC: [id number] EXAM:  MR ANGIO BRAIN                          PROCEDURE DATE:  01/18/2022          INTERPRETATION:  CLINICAL STATEMENT: Pain.    TECHNIQUE: MRI of the brain and MRA of the head were performed without   gadolinium.    COMPARISON: CT head 1/15/2020    FINDINGS:  MRI of the brain:  There is mild diffuse parenchymal volume loss.  There are T2 prolongation   signal abnormalities in the brainstem, periventricular subcortical white   matter likely related to moderate chronic microvascular ischemic changes.    There is no acute parenchymal hemorrhage, parenchymal mass, mass effect   or midline shift. There is no extra-axial fluid collection.  There is no   hydrocephalus.  There is no acute infarct.    Degenerative changes noted cervical spine.    MRA of the head:  Limited exam due to motion. Evaluation for stenosis and intracranial   aneurysm limited due to motion    The proximal anterior, middle and posterior cerebral arteriesare patent.  Questionable narrowing distal V4 segment left vertebral artery  The intracranial vertebral and basilar arteries are patent.        IMPRESSION:  Limited exam due to motion.    No acute intracranial hemorrhage or acute infarct    MRA head exam markedly limited due to motion. Grossly patent visualized   vessels. Evaluation for stenosis and aneurysm limited. Repeat exam could   be obtained when patient more stable/cooperative    --- End of Report ---            CHITO CAMPO MD; Attending Radiologist  This document has been electronically signed. Jan 18 2022  2:15PM    < end of copied text >  
    SUBJECTIVE / OVERNIGHT EVENTS:pt seen and examined  22     MEDICATIONS  (STANDING):  aspirin enteric coated 81 milliGRAM(s) Oral daily  atorvastatin 40 milliGRAM(s) Oral at bedtime  carbidopa/levodopa  25/100 1.5 Tablet(s) Oral every 8 hours  cholecalciferol 2000 Unit(s) Oral daily  enoxaparin Injectable 60 milliGRAM(s) SubCutaneous every 12 hours  levothyroxine 100 MICROGram(s) Oral daily  metoprolol tartrate 12.5 milliGRAM(s) Oral every 12 hours  multivitamin/minerals 1 Tablet(s) Oral daily  pantoprazole    Tablet 40 milliGRAM(s) Oral before breakfast  rasagiline Tablet 0.5 milliGRAM(s) Oral daily    MEDICATIONS  (PRN):  acetaminophen     Tablet .. 650 milliGRAM(s) Oral every 6 hours PRN Temp greater or equal to 38C (100.4F), Mild Pain (1 - 3)  aluminum hydroxide/magnesium hydroxide/simethicone Suspension 30 milliLiter(s) Oral every 4 hours PRN Dyspepsia  melatonin 3 milliGRAM(s) Oral at bedtime PRN Insomnia  metoprolol tartrate Injectable 5 milliGRAM(s) IV Push every 6 hours PRN HR> 140  ondansetron Injectable 4 milliGRAM(s) IV Push every 8 hours PRN Nausea and/or Vomiting    T(C): 36.7 (22 @ 17:42), Max: 36.8 (22 @ 05:12)  HR: 80 (22 @ 17:42) (72 - 94)  BP: 122/55 (22 @ 17:42) (102/68 - 183/86)  RR: 18 (22 @ 17:42) (18 - 19)  SpO2: 98% (22 @ 17:42) (98% - 99%)    CAPILLARY BLOOD GLUCOSE        I&O's Summary      Constitutional: No fever, fatigue  Skin: No rash.  Eyes: No recent vision problems or eye pain.  ENT: No congestion, ear pain, or sore throat.  Cardiovascular: No chest pain or palpation.  Respiratory: No cough, shortness of breath, congestion, or wheezing.  Gastrointestinal: No abdominal pain, nausea, vomiting, or diarrhea.  Genitourinary: No dysuria.  Musculoskeletal: No joint swelling.  Neurologic: No headache.    PHYSICAL EXAM:  GENERAL: NAD  EYES: EOMI, PERRLA  NECK: Supple, No JVD  CHEST/LUNG: dec breath sounds at bases  HEART:  S1 , S2 +  ABDOMEN: soft , bs+  EXTREMITIES:  trace edema  NEUROLOGY:alert awake       LABS:                        11.4   11.93 )-----------( 253      ( 2022 07:18 )             35.5     01-16    140  |  109<H>  |  21<H>  ----------------------------<  117<H>  3.1<L>   |  23  |  0.84    Ca    7.8<L>      2022 07:18    TPro  7.1  /  Alb  3.3<L>  /  TBili  0.7  /  DBili  x   /  AST  26  /  ALT  9<L>  /  AlkPhos  69  01-15    PT/INR - ( 15 Tyler 2022 13:30 )   PT: 11.3 sec;   INR: 0.95 ratio         PTT - ( 15 Tyler 2022 13:30 )  PTT:24.9 sec      Urinalysis Basic - ( 15 Tyler 2022 15:37 )    Color: Yellow / Appearance: Clear / S.005 / pH: x  Gluc: x / Ketone: Negative  / Bili: Negative / Urobili: Negative   Blood: x / Protein: 30 mg/dL / Nitrite: Negative   Leuk Esterase: Negative / RBC: 5-10 /HPF / WBC 3-5 /HPF   Sq Epi: x / Non Sq Epi: Few /HPF / Bacteria: Few /HPF        RADIOLOGY & ADDITIONAL TESTS:    Imaging Personally Reviewed:    Consultant(s) Notes Reviewed:      Care Discussed with Consultants/Other Providers:  
PGY-1 Progress Note discussed with attending    PAGER #: [[phone]] TILL 5:00 PM  PLEASE CONTACT ON CALL TEAM:  - On Call Team (Please refer to Kendrick) FROM 5:00 PM - 8:30PM  - Nightfloat Team FROM 8:30 -7:30 AM    INTERVAL HPI/ OVERNIGHT EVENTS: Patient evaluated at beside this AM, no new complaints        REVIEW OF SYSTEMS:  CONSTITUTIONAL: No fever, weight loss, or fatigue  RESPIRATORY: No cough, wheezing, chills or hemoptysis; No shortness of breath  CARDIOVASCULAR: No chest pain, palpitations, dizziness, or leg swelling  GASTROINTESTINAL: No abdominal pain. No nausea, vomiting, or hematemesis; No diarrhea or constipation. No melena or hematochezia.  GENITOURINARY: No dysuria or hematuria, urinary frequency  NEUROLOGICAL: No headaches, memory loss, loss of strength, numbness, or tremors  SKIN: No itching, burning, rashes, or lesions     MEDICATIONS  (STANDING):  aspirin enteric coated 81 milliGRAM(s) Oral daily  atorvastatin 40 milliGRAM(s) Oral at bedtime  carbidopa/levodopa  25/100 1.5 Tablet(s) Oral every 8 hours  cholecalciferol 2000 Unit(s) Oral daily  enoxaparin Injectable 60 milliGRAM(s) SubCutaneous every 12 hours  levothyroxine 100 MICROGram(s) Oral daily  metoprolol tartrate 12.5 milliGRAM(s) Oral every 12 hours  multivitamin/minerals 1 Tablet(s) Oral daily  pantoprazole    Tablet 40 milliGRAM(s) Oral before breakfast  rasagiline Tablet 0.5 milliGRAM(s) Oral daily    MEDICATIONS  (PRN):  acetaminophen     Tablet .. 650 milliGRAM(s) Oral every 6 hours PRN Temp greater or equal to 38C (100.4F), Mild Pain (1 - 3)  aluminum hydroxide/magnesium hydroxide/simethicone Suspension 30 milliLiter(s) Oral every 4 hours PRN Dyspepsia  melatonin 3 milliGRAM(s) Oral at bedtime PRN Insomnia  metoprolol tartrate Injectable 5 milliGRAM(s) IV Push every 6 hours PRN HR> 140  ondansetron Injectable 4 milliGRAM(s) IV Push every 8 hours PRN Nausea and/or Vomiting      Vital Signs Last 24 Hrs  T(C): 36.3 (18 Jan 2022 11:03), Max: 37.4 (17 Jan 2022 23:55)  T(F): 97.4 (18 Jan 2022 11:03), Max: 99.3 (17 Jan 2022 23:55)  HR: 88 (18 Jan 2022 11:03) (79 - 88)  BP: 127/67 (18 Jan 2022 11:03) (103/56 - 161/92)  BP(mean): --  RR: 18 (18 Jan 2022 11:03) (18 - 18)  SpO2: 95% (18 Jan 2022 11:03) (93% - 99%)    PHYSICAL EXAMINATION:  GENERAL: NAD, AAOx3  HEAD: AT/NC  EYES: conjunctiva and sclera clear  NECK: supple, No JVD noted, Normal thyroid  CHEST/LUNG: CTABL; no rales, rhonchi, wheezing, or rubs  HEART: regular rate and rhythm; no murmurs, rubs, or gallops  ABDOMEN: soft, nontender, nondistended; Bowel sounds present  EXTREMITIES:  2+ Peripheral Pulses, No clubbing, cyanosis, or edema  SKIN: warm dry                          10.8   13.31 )-----------( 211      ( 18 Jan 2022 07:22 )             33.2     01-18    138  |  104  |  21<H>  ----------------------------<  119<H>  3.5   |  25  |  0.60    Ca    8.9      18 Jan 2022 07:22  Phos  2.1     01-18  Mg     1.8     01-18    TPro  6.3  /  Alb  2.7<L>  /  TBili  0.8  /  DBili  x   /  AST  51<H>  /  ALT  7<L>  /  AlkPhos  81  01-18    LIVER FUNCTIONS - ( 18 Jan 2022 07:22 )  Alb: 2.7 g/dL / Pro: 6.3 g/dL / ALK PHOS: 81 U/L / ALT: 7 U/L DA / AST: 51 U/L / GGT: x                 SARS-CoV-2: NotDetec (15 Tyler 2022 13:30)      CAPILLARY BLOOD GLUCOSE          RADIOLOGY & ADDITIONAL TESTS:                  
PGY-1 Progress Note discussed with attending    PAGER #: [[phone]] TILL 5:00 PM  PLEASE CONTACT ON CALL TEAM:  - On Call Team (Please refer to Kendrick) FROM 5:00 PM - 8:30PM  - Nightfloat Team FROM 8:30 -7:30 AM    INTERVAL HPI/ OVERNIGHT EVENTS: Patient evaluated at bedside this AM no acute events overnight.       REVIEW OF SYSTEMS:  CONSTITUTIONAL: No fever, weight loss, or fatigue  RESPIRATORY: No cough, wheezing, chills or hemoptysis; No shortness of breath  CARDIOVASCULAR: No chest pain, palpitations, dizziness, or leg swelling  GASTROINTESTINAL: No abdominal pain. No nausea, vomiting, or hematemesis; No diarrhea or constipation. No melena or hematochezia.  GENITOURINARY: No dysuria or hematuria, urinary frequency  NEUROLOGICAL: No headaches, memory loss, loss of strength, numbness, or tremors  SKIN: No itching, burning, rashes, or lesions     MEDICATIONS  (STANDING):  aspirin enteric coated 81 milliGRAM(s) Oral daily  atorvastatin 40 milliGRAM(s) Oral at bedtime  carbidopa/levodopa  25/100 1.5 Tablet(s) Oral every 8 hours  cholecalciferol 2000 Unit(s) Oral daily  enoxaparin Injectable 60 milliGRAM(s) SubCutaneous every 12 hours  levothyroxine 100 MICROGram(s) Oral daily  metoprolol tartrate 12.5 milliGRAM(s) Oral every 12 hours  multivitamin/minerals 1 Tablet(s) Oral daily  pantoprazole    Tablet 40 milliGRAM(s) Oral before breakfast  rasagiline Tablet 0.5 milliGRAM(s) Oral daily    MEDICATIONS  (PRN):  acetaminophen     Tablet .. 650 milliGRAM(s) Oral every 6 hours PRN Temp greater or equal to 38C (100.4F), Mild Pain (1 - 3)  aluminum hydroxide/magnesium hydroxide/simethicone Suspension 30 milliLiter(s) Oral every 4 hours PRN Dyspepsia  melatonin 3 milliGRAM(s) Oral at bedtime PRN Insomnia  ondansetron Injectable 4 milliGRAM(s) IV Push every 8 hours PRN Nausea and/or Vomiting      Vital Signs Last 24 Hrs  T(C): 36.7 (19 Jan 2022 11:23), Max: 36.9 (19 Jan 2022 08:00)  T(F): 98 (19 Jan 2022 11:23), Max: 98.4 (19 Jan 2022 08:00)  HR: 78 (19 Jan 2022 11:23) (71 - 84)  BP: 162/77 (19 Jan 2022 11:23) (112/64 - 168/83)  BP(mean): --  RR: 18 (19 Jan 2022 11:23) (17 - 18)  SpO2: 98% (19 Jan 2022 11:23) (93% - 98%)    PHYSICAL EXAMINATION:  GENERAL: NAD, AAOx3  HEAD: AT/NC  EYES: conjunctiva and sclera clear  NECK: supple, No JVD noted, Normal thyroid  CHEST/LUNG: CTABL; no rales, rhonchi, wheezing, or rubs  HEART: regular rate and rhythm; no murmurs, rubs, or gallops  ABDOMEN: soft, nontender, nondistended; Bowel sounds present  EXTREMITIES:  2+ Peripheral Pulses, No clubbing, cyanosis, or edema  SKIN: warm dry                          11.1   9.80  )-----------( 254      ( 19 Jan 2022 07:46 )             35.3     01-19    139  |  105  |  17  ----------------------------<  99  4.2   |  29  |  0.69    Ca    9.1      19 Jan 2022 07:46  Phos  2.9     01-19  Mg     2.2     01-19    TPro  6.2  /  Alb  2.3<L>  /  TBili  0.4  /  DBili  x   /  AST  32  /  ALT  8<L>  /  AlkPhos  72  01-19    LIVER FUNCTIONS - ( 19 Jan 2022 07:46 )  Alb: 2.3 g/dL / Pro: 6.2 g/dL / ALK PHOS: 72 U/L / ALT: 8 U/L DA / AST: 32 U/L / GGT: x           CARDIAC MARKERS ( 19 Jan 2022 07:46 )  x     / x     / 78 U/L / x     / 3.9 ng/mL        SARS-CoV-2: NotDetec (15 Tyler 2022 13:30)      CAPILLARY BLOOD GLUCOSE          RADIOLOGY & ADDITIONAL TESTS:

## 2022-01-19 NOTE — PROGRESS NOTE ADULT - PROBLEM SELECTOR PLAN 2
New onset Afib RVR   Rate was > 140, and the improved to < 110 s/p IVF bolus   No obvious signs of fluid overload, no chest pain or Hypotension  rate control+ac   monitor for active signs of bleeding given ac - risk and benefits of ac informed pt  c/w metoprolol 12.5 BID  lovenox 60mg BID  patient will be d/c on 2.5mg eliquis BID and ASA.
New onset Afib RVR   Rate was > 140, and the improved to < 110 s/p IVF bolus   No obvious signs of fluid overload, no chest pain or Hypotension  rate control+ac   monitor for active signs of bleeding given ac - risk and benefits of ac informed pt  c/w metoprolol 12.5 BID
New onset Afib RVR   Rate was > 140, and the improved to < 110 s/p IVF bolus   No obvious signs of fluid overload, no chest pain or Hypotension  rate control+ac   monitor for active signs of bleeding given ac - risk and benefits of ac informed pt  c/w metoprolol 12.5 BID  lovenox 60mg BID
New onset Afib RVR   Rate was > 140, and the improved to < 110 s/p IVF bolus   No obvious signs of fluid overload, no chest pain or Hypotension  rate control+ac   monitor fir active signs of bleeding given ac - risk and benefits of ac informed pt

## 2022-01-19 NOTE — PROGRESS NOTE ADULT - ATTENDING COMMENTS
discussed management plan with house staff

## 2022-01-19 NOTE — PROGRESS NOTE ADULT - PROBLEM SELECTOR PLAN 6
Pt on levothyroxine 100mcg x 6 days  TSH .2
Pt on levothyroxine 100mcg x 6 days  TSH .2
Pt on 100mcg x 6 days  f/u tfts
Pt on 100mcg x 6 days  TSH .2

## 2022-01-19 NOTE — PROGRESS NOTE ADULT - PROBLEM SELECTOR PROBLEM 2
Unspecified atrial fibrillation

## 2022-01-19 NOTE — PROGRESS NOTE ADULT - ASSESSMENT
The MRI does not demonstrate a stroke.  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    She may be discharged on aspirin clopidogrel (for 3 weeks) atorvastatin and her usual anti-Parkinson's medications.  Follow-up with cardiology for further investigations of cardiac arrhythmia is recommended.  
Patient is a 78 y/o female with HTN, HLD, hypothyroidism (S/p total thyroidectomy), Parkinson's Disease, avascular necrosis of bilateral hips (s/p intraarticular steroid injections), OA (s/p left THR 5/2019, R THR 3/2/20), s/p lumbar laminectomy with fusion on 3/1/19,  and s/p L3-4 right hemilaminotomy and discectomy 1/18/2020 was BIBEMS s/p syncopal episode sustained in her home. Admitted for syncope workup and new onset Afib RVR.      In the ED,  VS; 97.9, , /93mmHg, RR 17, 97% on RA   Labs significant for WBC 14.6, campbell 80.8, K+ 3.3, Lactate 4.3, Trop 332.4  ABG 7.48/26/396/19 (metabolic acidosis w/ respiratory compensation)  CTPA negative for PE   CTH with minimal white matter ischemia and no acute CVA/bleed  EKG showing AFib RVR     
Patient is a 80 y/o female with HTN, HLD, hypothyroidism (S/p total thyroidectomy), Parkinson's Disease, avascular necrosis of bilateral hips (s/p intraarticular steroid injections), OA (s/p left THR 5/2019, R THR 3/2/20), s/p lumbar laminectomy with fusion on 3/1/19,  and s/p L3-4 right hemilaminotomy and discectomy 1/18/2020 was BIBEMS s/p syncopal episode sustained in her home. Admitted for syncope workup and new onset Afib RVR.      In the ED,  VS; 97.9, , /93mmHg, RR 17, 97% on RA   Labs significant for WBC 14.6, campbell 80.8, K+ 3.3, Lactate 4.3, Trop 332.4  ABG 7.48/26/396/19 (metabolic acidosis w/ respiratory compensation)  CTPA negative for PE   CTH with minimal white matter ischemia and no acute CVA/bleed  EKG showing AFib RVR     
Patient is a 80 y/o female with HTN, HLD, hypothyroidism (S/p total thyroidectomy), Parkinson's Disease, avascular necrosis of bilateral hips (s/p intraarticular steroid injections), OA (s/p left THR 5/2019, R THR 3/2/20), s/p lumbar laminectomy with fusion on 3/1/19,  and s/p L3-4 right hemilaminotomy and discectomy 1/18/2020 was BIBEMS s/p syncopal episode sustained in her home. Admitted for syncope workup and new onset Afib RVR.      In the ED,  VS; 97.9, , /93mmHg, RR 17, 97% on RA   Labs significant for WBC 14.6, campbell 80.8, K+ 3.3, Lactate 4.3, Trop 332.4  ABG 7.48/26/396/19 (metabolic acidosis w/ respiratory compensation)  CTPA negative for PE   CTH with minimal white matter ischemia and no acute CVA/bleed  EKG showing AFib RVR     
Patient is a 78 y/o female with HTN, HLD, hypothyroidism (S/p total thyroidectomy), Parkinson's Disease, avascular necrosis of bilateral hips (s/p intraarticular steroid injections), OA (s/p left THR 5/2019, R THR 3/2/20), s/p lumbar laminectomy with fusion on 3/1/19,  and s/p L3-4 right hemilaminotomy and discectomy 1/18/2020 was BIBEMS s/p syncopal episode sustained in her home. Admitted for syncope workup and new onset Afib RVR.      In the ED,  VS; 97.9, , /93mmHg, RR 17, 97% on RA   Labs significant for WBC 14.6, campbell 80.8, K+ 3.3, Lactate 4.3, Trop 332.4  ABG 7.48/26/396/19 (metabolic acidosis w/ respiratory compensation)  CTPA negative for PE   CTH with minimal white matter ischemia and no acute CVA/bleed  EKG showing AFib RVR

## 2022-01-19 NOTE — DISCHARGE NOTE PROVIDER - NSDCMRMEDTOKEN_GEN_ALL_CORE_FT
aspirin 81 mg oral delayed release tablet: 1 tab(s) orally 2 times a day  atorvastatin 40 mg oral tablet: 1 tab(s) orally once a day  cannabidiol 100 mg/mL oral liquid: 3.5 milliliter(s) orally 2 times a day  unclear dosage that pt takes at home   (self medicating)  carbidopa-levodopa 25 mg-100 mg oral tablet: 1.5 tab(s) orally 3 times a day  Centrum Silver oral tablet: 1 tab(s) orally once a day LD 2/25/2020  Eliquis 2.5 mg oral tablet: 1 tab(s) orally 2 times a day   Fosamax 70 mg oral tablet: 1 tab(s) orally once a week  Levoxyl 100 mcg (0.1 mg) oral tablet: 1 tab(s) orally once a day x 6 days   Levoxyl 100 mcg (0.1 mg) oral tablet: 0.5 tab(s) orally once a day x 1 day  Melatonin 10 mg oral capsule: 1 cap(s) orally once a day (at bedtime)  metoprolol tartrate 25 mg oral tablet: 1 tab(s) orally 2 times a day   Please hold medication if your blood pressure is less than 110 systolic and 55 diastolic. Please hold if your heart rate is less than 60.   pantoprazole 40 mg oral delayed release tablet: 1 tab(s) orally once a day (before a meal)  rasagiline 0.5 mg oral tablet: 1 tab(s) orally once a day  senna oral tablet: 2 tab(s) orally once a day (at bedtime)

## 2022-01-19 NOTE — PROGRESS NOTE ADULT - PROBLEM SELECTOR PLAN 4
Patient has reports that her tremor and gait symptoms are well controlled  C/w home dose of CD/LD, rasagiline for now (confirm with pharmacy)
Patient has reports that her tremor and gait symptoms are well controlled  C/w home dose of CD/LD, rasagiline for now (confirm with pharmacy)
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

## 2022-01-19 NOTE — DISCHARGE NOTE PROVIDER - HOSPITAL COURSE
80 y/o female with HTN, HLD, hypothyroidism (S/p total thyroidectomy), Parkinson's Disease, avascular necrosis of bilateral hips (s/p intraarticular steroid injections), OA (s/p left THR 5/2019, R THR 3/2/20), s/p lumbar laminectomy with fusion on 3/1/19,  and s/p L3-4 right hemilaminotomy and discectomy 1/18/2020 was BIBEMS s/p syncopal episode sustained in her home. Patient reports that she had was walking within her house when she felt warm and lightheaded prior to falling to her knees; she denied LOC or head trauma. At that point she was able to drag herself to her bedroom and press her med-alert necklace to alert EMS. She denied any preceding chest pain, palpitations, leg swelling, headache, focal weakness in her extremities (apart from her baseline numbness d/t spinal stenosis), dysuria, urinary incontinence, or tongue bite. Of note, she reports taking medical marijuana drops (1:1 THC/CBD concentration) to deal with chronic pain and has increased her dose to 100mg 1 month ago. At the same time, she began having episodes of near syncope prior to the one that currently brought her in; these were also preceded by feelings of warmth and lightheadedness. She has no prior history of cardiac arrythmias. In ED, patient had CT head which was negative. EKG showed new onset A-FIb with RVR and CT angio was negative for PE. Neurology was consulted and MRI and MRA of brain were performed which were negative for PE. Patient had elevated troponin which was likely due to demand ischemia, EKG was negative for ischemic changes. Echocardiogram was negative, EF 55-60% and nuclear stress test was also negative. Cardiology was consulted and patient was started on full dose lovenox for anticoagulation and metoprolol for rate control. Physical therapy recommended home physical therapy. Patient clinical status continued to improve and patient will be going home on Eliquis and Aspirin.       Given patient's improved clinical status and current hemodynamic stability, decision was made to discharge. Discussed with attending  Please refer to patient's complete medical chart with documents for a full hospital course, for this is only a brief summary. 82 y/o female with HTN, HLD, hypothyroidism (S/p total thyroidectomy), Parkinson's Disease, avascular necrosis of bilateral hips (s/p intraarticular steroid injections), OA (s/p left THR 5/2019, R THR 3/2/20), s/p lumbar laminectomy with fusion on 3/1/19,  and s/p L3-4 right hemilaminotomy and discectomy 1/18/2020 was BIBEMS s/p syncopal episode sustained in her home. Patient reports that she had was walking within her house when she felt warm and lightheaded prior to falling to her knees; she denied LOC or head trauma. At that point she was able to drag herself to her bedroom and press her med-alert necklace to alert EMS. She denied any preceding chest pain, palpitations, leg swelling, headache, focal weakness in her extremities (apart from her baseline numbness d/t spinal stenosis), dysuria, urinary incontinence, or tongue bite. Of note, she reports taking medical marijuana drops (1:1 THC/CBD concentration) to deal with chronic pain and has increased her dose to 100mg 1 month ago. At the same time, she began having episodes of near syncope prior to the one that currently brought her in; these were also preceded by feelings of warmth and lightheadedness. She has no prior history of cardiac arrythmias. In ED, patient had CT head which was negative. EKG showed new onset A-FIb with RVR and CT angio was negative for PE. Neurology was consulted and MRI and MRA of brain were performed which were negative for PE. Patient had elevated troponin which was likely due to demand ischemia, EKG was negative for ischemic changes. Echocardiogram was negative, EF 55-60% and nuclear stress test was also negative. Cardiology was consulted and patient was started on full dose lovenox for anticoagulation and metoprolol for rate control. Physical therapy recommended home physical therapy. Patient clinical status continued to improve and patient will be going home on Eliquis and Aspirin.       Given patient's improved clinical status and current hemodynamic stability, decision was made to discharge. Discussed with attending  Please refer to patient's complete medical chart with documents for a full hospital course, for this is only a brief summary.

## 2022-01-19 NOTE — SBIRT NOTE ADULT - NSSBIRTALCPOSREINDET_GEN_A_CORE
Patient declined the use of any alcohol; education and positive reinforcement reg: alcohol use provided. 
ppi

## 2022-01-19 NOTE — DISCHARGE NOTE NURSING/CASE MANAGEMENT/SOCIAL WORK - PATIENT PORTAL LINK FT
You can access the FollowMyHealth Patient Portal offered by [facility] by registering at the following website: [email or web address]. By joining [facility]’s FollowMyHealth portal, you will also be able to view your health information using other applications (apps) compatible with our system.

## 2022-01-19 NOTE — SBIRT NOTE ADULT - NSSBIRTDRGPOSREINDET_GEN_A_CORE
Patient reports she does not engage in any substance use that are not required for medical reasons or that are not prescribed.  Education and positive reinforcement provided.

## 2022-01-19 NOTE — PROGRESS NOTE ADULT - PROBLEM SELECTOR PLAN 3
Pt w/o Chest pain  EKG Afib RVR with no ST changes   Trop 332>7305>13K>10K, likely due to demand ischemia   cards f/u - pt chest pain free
Pt w/o Chest pain  EKG Afib RVR with no ST changes   Trop 332>7305  cards f/u - pt chest pain free

## 2022-01-19 NOTE — PROGRESS NOTE ADULT - PROBLEM SELECTOR PLAN 1
Likely multifactorial  Pt had classic prodrome for vasovagal syncope, high suspicion for cardiac syncope (new onset afib), orthostatic hypotension (PD on treatment), and drug induced (inc doses of THC/CBD)  lactate elevated w/o signs of infection or SIRS, will hold Abx for now   C/w IVF for now  C/w tele monitoring   Hold all sedating medications (pt on medical marijuana, and previously Gabapentin, oxycodone)  C/w home dose of CD/LD for now (pt takes Q8h, may not be appropriately controlled)  Pt consult    Neuro consulted Dr. Bruno, recommending brain MRA and MRI, negative for acute events   -Orthostatic positive.
Likely multifactorial  Pt had classic prodrome for vasovagal syncope, high suspicion for cardiac syncope (new onset afib), orthostatic hypotension (PD on treatment), and drug induced (inc doses of THC/CBD)  lactate elevated w/o signs of infection or SIRS, will hold Abx for now   C/w IVF for now, and f/u orthostatic BP   C/w tele monitoring   Hold all sedating medications (pt on medical marijuana, and previously Gabapentin, oxycodone)  C/w home dose of CD/LD for now (pt takes Q8h, may not be appropriately controlled)  Pt consult    Neuro consulted Dr. Bruno
Likely multifactorial  Pt had classic prodrome for vasovagal syncope, high suspicion for cardiac syncope (new onset afib), orthostatic hypotension (PD on treatment), and drug induced (inc doses of THC/CBD)  lactate elevated w/o signs of infection or SIRS, will hold Abx for now   C/w IVF for now  C/w tele monitoring   Hold all sedating medications (pt on medical marijuana, and previously Gabapentin, oxycodone)  C/w home dose of CD/LD for now (pt takes Q8h, may not be appropriately controlled)  Pt consult    Neuro consulted Dr. Bruno, recommending brain MRA and MRI, f/u results  -Orthostatic positive.
Likely multifactorial  Pt had classic prodrome for vasovagal syncope, high suspicion for cardiac syncope (new onset afib), orthostatic hypotension (PD on treatment), and drug induced (inc doses of THC/CBD)  lactate elevated w/o signs of infection or SIRS, will hold Abx for now   C/w IVF for now, and f/u orthostatic BP   C/w tele monitoring   Hold all sedating medications (pt on medical marijuana, and previously Gabapentin, oxycodone)  C/w home dose of CD/LD for now (pt takes Q8h, may not be appropriately controlled)  Pt consult    Neuro consulted Dr. Bruno, recommending brain MRA and MRI

## 2022-01-19 NOTE — DISCHARGE NOTE NURSING/CASE MANAGEMENT/SOCIAL WORK - NSDCPEFALRISK_GEN_ALL_CORE
For information on Fall & Injury Prevention, visit: [email or web address] OR  [email or web address] OR  https://www.cdc.gov/steadi/patient.html

## 2022-01-19 NOTE — PROGRESS NOTE ADULT - PROBLEM SELECTOR PLAN 5
S/p multiple ortho procedures including laminectomy and B/L THR   Pt previously on gabapentin and oxycodone, and on medical marijuana (recently inc her own dosage)  Pain Mng consult

## 2022-01-19 NOTE — PROGRESS NOTE ADULT - PROBLEM SELECTOR PLAN 7
Pt on Fosamax 70mg Qweekly

## 2022-01-19 NOTE — DISCHARGE NOTE PROVIDER - NSDCCPCAREPLAN_GEN_ALL_CORE_FT
PRINCIPAL DISCHARGE DIAGNOSIS  Diagnosis: Syncope  Assessment and Plan of Treatment: You came to the hospital because you fell possibly due to syncope or weakness. A CT scan of your head and MRI of your head were both negative. You also had an echocardiogram and a stress which were negative. You were found to have a new onset of Atrial fibrillation which can sometimes cause you to be dizzy and fall. You were seen by yiscal therapy who recommended home physical therapy. Please try to take precautions when ambulating to prevent future falls. Your Parkinsons Disease can also cause your blood pressure to drop when getting up out of a seated position rapidly. Please allow one or two minutes and sit in bed before getting up and walking.      SECONDARY DISCHARGE DIAGNOSES  Diagnosis: Unspecified atrial fibrillation  Assessment and Plan of Treatment: You were found to have an abnormal heart rate and rythm. A cardiologist was consulted. We started you on a heart rate control medication metoprolol and a blood thinner medication called eliqiuis. Please take those medications as prescribed and follow up with your doctor after leaving the hospital.    Diagnosis: Parkinsons disease  Assessment and Plan of Treatment: Please continue to take your home medications as prescribed.    Diagnosis: Back pain, chronic  Assessment and Plan of Treatment: Please continue to take your home medication as prescribed.    Diagnosis: Hypothyroidism  Assessment and Plan of Treatment: Please continue to take your home medication as prescribed.    Diagnosis: Osteoporosis  Assessment and Plan of Treatment: Please continue to take your home medication as prescribed.     PRINCIPAL DISCHARGE DIAGNOSIS  Diagnosis: Syncope  Assessment and Plan of Treatment: You came to the hospital because you fell possibly due to syncope or weakness. A CT scan of your head and MRI of your head were both negative. You also had an echocardiogram and a stress which were negative. You were found to have a new onset of Atrial fibrillation which can sometimes cause you to be dizzy and fall. You were seen by yiscal therapy who recommended home physical therapy. Please try to take precautions when ambulating to prevent future falls. Your Parkinsons Disease can also cause your blood pressure to drop when getting up out of a seated position rapidly. Please allow one or two minutes and sit in bed before getting up and walking. please stay well hydarted at all times and wear stockings to help with blood pressure.      SECONDARY DISCHARGE DIAGNOSES  Diagnosis: Unspecified atrial fibrillation  Assessment and Plan of Treatment: You were found to have an abnormal heart rate and rythm. A cardiologist was consulted. We started you on a heart rate control medication metoprolol and a blood thinner medication called eliqiuis. Please take those medications as prescribed and follow up with your doctor after leaving the hospital.    Diagnosis: Parkinsons disease  Assessment and Plan of Treatment: Please continue to take your home medications as prescribed.    Diagnosis: Back pain, chronic  Assessment and Plan of Treatment: Please continue to take your home medication as prescribed.    Diagnosis: Hypothyroidism  Assessment and Plan of Treatment: Please continue to take your home medication as prescribed.    Diagnosis: Osteoporosis  Assessment and Plan of Treatment: Please continue to take your home medication as prescribed.

## 2022-01-20 ENCOUNTER — FORM ENCOUNTER (OUTPATIENT)
Age: 82
End: 2022-01-20

## 2022-01-20 LAB
CULTURE RESULTS: SIGNIFICANT CHANGE UP
CULTURE RESULTS: SIGNIFICANT CHANGE UP
SPECIMEN SOURCE: SIGNIFICANT CHANGE UP
SPECIMEN SOURCE: SIGNIFICANT CHANGE UP

## 2022-01-21 ENCOUNTER — NON-APPOINTMENT (OUTPATIENT)
Age: 82
End: 2022-01-21

## 2022-01-21 RX ORDER — APIXABAN 2.5 MG/1
2.5 TABLET, FILM COATED ORAL
Qty: 60 | Refills: 1 | Status: ACTIVE | COMMUNITY
Start: 2022-01-21

## 2022-01-25 ENCOUNTER — NON-APPOINTMENT (OUTPATIENT)
Age: 82
End: 2022-01-25

## 2022-01-28 ENCOUNTER — APPOINTMENT (OUTPATIENT)
Dept: INTERNAL MEDICINE | Facility: CLINIC | Age: 82
End: 2022-01-28
Payer: MEDICARE

## 2022-01-28 VITALS
HEART RATE: 66 BPM | WEIGHT: 124 LBS | TEMPERATURE: 98.2 F | OXYGEN SATURATION: 98 % | DIASTOLIC BLOOD PRESSURE: 80 MMHG | HEIGHT: 59 IN | BODY MASS INDEX: 25 KG/M2 | SYSTOLIC BLOOD PRESSURE: 127 MMHG

## 2022-01-28 DIAGNOSIS — Z86.19 PERSONAL HISTORY OF OTHER INFECTIOUS AND PARASITIC DISEASES: ICD-10-CM

## 2022-01-28 DIAGNOSIS — K21.9 GASTRO-ESOPHAGEAL REFLUX DISEASE W/OUT ESOPHAGITIS: ICD-10-CM

## 2022-01-28 DIAGNOSIS — M43.10 SPONDYLOLISTHESIS, SITE UNSPECIFIED: ICD-10-CM

## 2022-01-28 DIAGNOSIS — R13.19 OTHER DYSPHAGIA: ICD-10-CM

## 2022-01-28 DIAGNOSIS — M54.50 LOW BACK PAIN, UNSPECIFIED: ICD-10-CM

## 2022-01-28 DIAGNOSIS — Z87.39 PERSONAL HISTORY OF OTHER DISEASES OF THE MUSCULOSKELETAL SYSTEM AND CONNECTIVE TISSUE: ICD-10-CM

## 2022-01-28 DIAGNOSIS — Z13.31 ENCOUNTER FOR SCREENING FOR DEPRESSION: ICD-10-CM

## 2022-01-28 DIAGNOSIS — Z86.39 PERSONAL HISTORY OF OTHER ENDOCRINE, NUTRITIONAL AND METABOLIC DISEASE: ICD-10-CM

## 2022-01-28 DIAGNOSIS — R92.2 INCONCLUSIVE MAMMOGRAM: ICD-10-CM

## 2022-01-28 DIAGNOSIS — Z86.79 PERSONAL HISTORY OF OTHER DISEASES OF THE CIRCULATORY SYSTEM: ICD-10-CM

## 2022-01-28 DIAGNOSIS — M70.61 TROCHANTERIC BURSITIS, RIGHT HIP: ICD-10-CM

## 2022-01-28 DIAGNOSIS — G89.29 LOW BACK PAIN, UNSPECIFIED: ICD-10-CM

## 2022-01-28 DIAGNOSIS — M79.642 PAIN IN LEFT HAND: ICD-10-CM

## 2022-01-28 DIAGNOSIS — M70.62 TROCHANTERIC BURSITIS, RIGHT HIP: ICD-10-CM

## 2022-01-28 DIAGNOSIS — M43.16 SPONDYLOLISTHESIS, LUMBAR REGION: ICD-10-CM

## 2022-01-28 DIAGNOSIS — Z91.89 OTHER SPECIFIED PERSONAL RISK FACTORS, NOT ELSEWHERE CLASSIFIED: ICD-10-CM

## 2022-01-28 DIAGNOSIS — N39.41 URGE INCONTINENCE: ICD-10-CM

## 2022-01-28 DIAGNOSIS — M70.62 TROCHANTERIC BURSITIS, LEFT HIP: ICD-10-CM

## 2022-01-28 PROCEDURE — 36415 COLL VENOUS BLD VENIPUNCTURE: CPT

## 2022-01-28 PROCEDURE — 99495 TRANSJ CARE MGMT MOD F2F 14D: CPT | Mod: 25

## 2022-01-28 RX ORDER — PREGABALIN 25 MG/1
25 CAPSULE ORAL
Qty: 30 | Refills: 0 | Status: DISCONTINUED | COMMUNITY
Start: 2021-11-22 | End: 2022-01-28

## 2022-01-28 RX ORDER — MELOXICAM 7.5 MG/1
7.5 TABLET ORAL
Qty: 30 | Refills: 0 | Status: DISCONTINUED | COMMUNITY
Start: 2021-11-22 | End: 2022-01-28

## 2022-01-28 RX ORDER — METOPROLOL TARTRATE 25 MG/1
25 TABLET, FILM COATED ORAL TWICE DAILY
Qty: 60 | Refills: 3 | Status: ACTIVE | COMMUNITY
Start: 2022-01-19 | End: 1900-01-01

## 2022-01-28 RX ORDER — PREGABALIN 50 MG/1
50 CAPSULE ORAL
Qty: 30 | Refills: 0 | Status: DISCONTINUED | COMMUNITY
Start: 2021-07-14 | End: 2022-01-28

## 2022-01-28 RX ORDER — OMEPRAZOLE 20 MG/1
20 CAPSULE, DELAYED RELEASE ORAL
Qty: 90 | Refills: 2 | Status: DISCONTINUED | COMMUNITY
Start: 2021-03-12 | End: 2022-01-28

## 2022-01-28 RX ORDER — MICONAZOLE NITRATE 20 MG/G
2 CREAM TOPICAL TWICE DAILY
Qty: 1 | Refills: 1 | Status: DISCONTINUED | COMMUNITY
Start: 2021-11-15 | End: 2022-01-28

## 2022-01-28 NOTE — PHYSICAL EXAM
[No Acute Distress] : no acute distress [Well Nourished] : well nourished [Well Developed] : well developed [Well-Appearing] : well-appearing [No Lymphadenopathy] : no lymphadenopathy [Supple] : supple [No Respiratory Distress] : no respiratory distress  [No Accessory Muscle Use] : no accessory muscle use [Clear to Auscultation] : lungs were clear to auscultation bilaterally [Normal Rate] : normal rate  [Regular Rhythm] : with a regular rhythm [Normal S1, S2] : normal S1 and S2 [No Edema] : there was no peripheral edema [Alert and Oriented x3] : oriented to person, place, and time [de-identified] : walks with rolling walker

## 2022-01-28 NOTE — ASSESSMENT
[FreeTextEntry1] : Labs today\par Continue current meds\par Cardio eval\par Bleeding risk and fall risk discussed with pt today.  If any falls, must go to Er for an evaluation\par No NSAIDs discussed\par fuv in 1 -2 months or sooner if necessary

## 2022-01-28 NOTE — HISTORY OF PRESENT ILLNESS
[Post-hospitalization from ___ Hospital] : Post-hospitalization from [unfilled] Hospital [Admitted on: ___] : The patient was admitted on [unfilled] [Discharged on ___] : discharged on [unfilled] [Discharge Summary] : discharge summary [Pertinent Labs] : pertinent labs [Discharge Med List] : discharge medication list [Patient Contacted By: ____] : and contacted by [unfilled] [FreeTextEntry2] : RENETTA STALEY is an 80 y/o female with HTN, hypercholesterolemia, hypothyroidism (S/p total thyroidectomy), Parkinson's Disease, avascular necrosis of bilateral hips (s/p intraarticular steroid injections), OA (s/p left THR 5/2019, R THR 3/2/20), s/p lumbar laminectomy with fusion on 3/1/19, and s/p L3-4 right hemilaminotomy and discectomy 1/18/2020 was hospitalized from 1/15 thru 1/19/22 for syncopal episode.  The patient was found to be in a fib and started on metoprolol and lovenox.  She was discharged in stable condition on metoprolol and eliquis. \par \par The patient has chronic back pain and was taking lyrica and medical marijuana. She stopped medical marijuana and has been tapering herself off of lyrica.  She has been using tylenol prn for pain. \par \par Sees neurologist Dr Correa.  According to pt, saw cardiologist dr duc shearer in the past.  Will schedule appt soon\par \par The patient is  with 2 children and 5 grandchildren. 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 Walks slowly with rolling walker.  2 children [place]. Nieces and nephews are local

## 2022-01-31 LAB
ALBUMIN SERPL ELPH-MCNC: 4.3 G/DL
ALP BLD-CCNC: 80 U/L
ALT SERPL-CCNC: 15 U/L
ANION GAP SERPL CALC-SCNC: 13 MMOL/L
AST SERPL-CCNC: 27 U/L
BASOPHILS # BLD AUTO: 0.09 K/UL
BASOPHILS NFR BLD AUTO: 0.9 %
BILIRUB SERPL-MCNC: 0.3 MG/DL
BUN SERPL-MCNC: 23 MG/DL
CALCIUM SERPL-MCNC: 9.8 MG/DL
CHLORIDE SERPL-SCNC: 100 MMOL/L
CO2 SERPL-SCNC: 26 MMOL/L
CREAT SERPL-MCNC: 0.86 MG/DL
EOSINOPHIL # BLD AUTO: 0.13 K/UL
EOSINOPHIL NFR BLD AUTO: 1.3 %
GLUCOSE SERPL-MCNC: 105 MG/DL
HCT VFR BLD CALC: 41.9 %
HGB BLD-MCNC: 12.8 G/DL
IMM GRANULOCYTES NFR BLD AUTO: 0.5 %
LYMPHOCYTES # BLD AUTO: 1.87 K/UL
LYMPHOCYTES NFR BLD AUTO: 18.6 %
MAN DIFF?: NORMAL
MCHC RBC-ENTMCNC: 27.8 PG
MCHC RBC-ENTMCNC: 30.5 GM/DL
MCV RBC AUTO: 90.9 FL
MONOCYTES # BLD AUTO: 0.74 K/UL
MONOCYTES NFR BLD AUTO: 7.4 %
NEUTROPHILS # BLD AUTO: 7.18 K/UL
NEUTROPHILS NFR BLD AUTO: 71.3 %
PLATELET # BLD AUTO: 388 K/UL
POTASSIUM SERPL-SCNC: 4.8 MMOL/L
PROT SERPL-MCNC: 6.9 G/DL
RBC # BLD: 4.61 M/UL
RBC # FLD: 17 %
SODIUM SERPL-SCNC: 139 MMOL/L
T4 FREE SERPL-MCNC: 1.6 NG/DL
TSH SERPL-ACNC: 1.43 UIU/ML
WBC # FLD AUTO: 10.06 K/UL

## 2022-02-03 ENCOUNTER — APPOINTMENT (OUTPATIENT)
Dept: ORTHOPEDIC SURGERY | Facility: CLINIC | Age: 82
End: 2022-02-03
Payer: MEDICARE

## 2022-02-03 VITALS — WEIGHT: 124 LBS | HEIGHT: 59 IN | BODY MASS INDEX: 25 KG/M2

## 2022-02-03 DIAGNOSIS — M54.16 RADICULOPATHY, LUMBAR REGION: ICD-10-CM

## 2022-02-03 DIAGNOSIS — Z98.1 ARTHRODESIS STATUS: ICD-10-CM

## 2022-02-03 PROCEDURE — 99214 OFFICE O/P EST MOD 30 MIN: CPT

## 2022-02-04 ENCOUNTER — RX RENEWAL (OUTPATIENT)
Age: 82
End: 2022-02-04

## 2022-02-15 RX ORDER — ALENDRONATE SODIUM 70 MG/1
70 TABLET ORAL
Qty: 13 | Refills: 0 | Status: ACTIVE | COMMUNITY
Start: 2020-10-30 | End: 1900-01-01

## 2022-03-11 ENCOUNTER — APPOINTMENT (OUTPATIENT)
Dept: ENDOCRINOLOGY | Facility: CLINIC | Age: 82
End: 2022-03-11

## 2022-04-07 ENCOUNTER — APPOINTMENT (OUTPATIENT)
Dept: INTERNAL MEDICINE | Facility: CLINIC | Age: 82
End: 2022-04-07
Payer: MEDICARE

## 2022-04-07 VITALS
OXYGEN SATURATION: 98 % | SYSTOLIC BLOOD PRESSURE: 180 MMHG | DIASTOLIC BLOOD PRESSURE: 92 MMHG | HEIGHT: 59 IN | TEMPERATURE: 97.16 F | HEART RATE: 78 BPM

## 2022-04-07 VITALS — DIASTOLIC BLOOD PRESSURE: 84 MMHG | SYSTOLIC BLOOD PRESSURE: 158 MMHG

## 2022-04-07 DIAGNOSIS — Z23 ENCOUNTER FOR IMMUNIZATION: ICD-10-CM

## 2022-04-07 PROCEDURE — 99214 OFFICE O/P EST MOD 30 MIN: CPT | Mod: 25

## 2022-04-07 RX ORDER — ASPIRIN 81 MG/1
81 TABLET ORAL
Refills: 0 | Status: DISCONTINUED | COMMUNITY
Start: 2022-01-21 | End: 2022-04-07

## 2022-04-07 NOTE — ASSESSMENT
[FreeTextEntry1] : Tdap today\par COnsider covid 19 vaccine #4\par Continue current meds\par Cardio, neuro fuv\par CPX when due or sooner if necessary

## 2022-04-07 NOTE — PHYSICAL EXAM
[No Acute Distress] : no acute distress [Well Nourished] : well nourished [Well Developed] : well developed [Well-Appearing] : well-appearing [No Lymphadenopathy] : no lymphadenopathy [Supple] : supple [No Respiratory Distress] : no respiratory distress  [No Accessory Muscle Use] : no accessory muscle use [Clear to Auscultation] : lungs were clear to auscultation bilaterally [Normal Rate] : normal rate  [Regular Rhythm] : with a regular rhythm [Normal S1, S2] : normal S1 and S2 [No Edema] : there was no peripheral edema [Alert and Oriented x3] : oriented to person, place, and time [de-identified] : walks with rolling walker.  Slow gait

## 2022-04-07 NOTE — HISTORY OF PRESENT ILLNESS
[FreeTextEntry1] : FUV [de-identified] : RENETTA STALEY is an 83 y/o female with HTN, hypercholesterolemia, hypothyroidism (S/p total thyroidectomy), Parkinson's Disease, avascular necrosis of bilateral hips (s/p intraarticular steroid injections), OA (s/p left THR 5/2019, R THR 3/2/20), s/p lumbar laminectomy with fusion on 3/1/19, and s/p L3-4 right hemilaminotomy and discectomy 1/18/2020 here for a bp check and fuv.  She has been well overall.  Has long term care policy so recently met with coordinator and may be approved for some help at home.\par \par RENETTA STALEY was hospitalized from 1/15 thru 1/19/22 for syncopal episode. The patient was found to be in a fib and started on metoprolol and lovenox. She was discharged in stable condition on metoprolol and eliquis. She is seeing cardiologist dr kylie gibson.  BP has been varying. High in office today but usually lower at home\par \par The patient has chronic back pain and was taking lyrica and medical marijuana.She has been using tylenol prn for pain. \par \par Sees neurologist GULSHAN Patten.  Due for visit\par \par The patient is  with 2 children and 5 grandchildren. 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 Walks slowly with rolling walker. 2 children are abroad. Nieces and nephews are local.   Daughter who is in [place] will be resettling in [place] in 9/22 so her son can start high school.\par  \par

## 2022-04-22 ENCOUNTER — EMERGENCY (EMERGENCY)
Facility: HOSPITAL | Age: 82
LOS: 1 days | Discharge: HOME | End: 2022-04-22
Attending: STUDENT IN AN ORGANIZED HEALTH CARE EDUCATION/TRAINING PROGRAM
Payer: MEDICARE

## 2022-04-22 VITALS
RESPIRATION RATE: 19 BRPM | HEIGHT: 59 IN | TEMPERATURE: 98 F | WEIGHT: 119.93 LBS | DIASTOLIC BLOOD PRESSURE: 75 MMHG | HEART RATE: 65 BPM | SYSTOLIC BLOOD PRESSURE: 176 MMHG | OXYGEN SATURATION: 97 %

## 2022-04-22 VITALS
TEMPERATURE: 98 F | HEART RATE: 74 BPM | DIASTOLIC BLOOD PRESSURE: 72 MMHG | SYSTOLIC BLOOD PRESSURE: 140 MMHG | RESPIRATION RATE: 18 BRPM | OXYGEN SATURATION: 99 %

## 2022-04-22 DIAGNOSIS — Z90.89 ACQUIRED ABSENCE OF OTHER ORGANS: Chronic | ICD-10-CM

## 2022-04-22 DIAGNOSIS — Z98.890 OTHER SPECIFIED POSTPROCEDURAL STATES: Chronic | ICD-10-CM

## 2022-04-22 DIAGNOSIS — E89.0 POSTPROCEDURAL HYPOTHYROIDISM: Chronic | ICD-10-CM

## 2022-04-22 DIAGNOSIS — Z96.642 PRESENCE OF LEFT ARTIFICIAL HIP JOINT: Chronic | ICD-10-CM

## 2022-04-22 LAB
ALBUMIN SERPL ELPH-MCNC: 3.5 G/DL — SIGNIFICANT CHANGE UP (ref 3.5–5)
ALP SERPL-CCNC: 54 U/L — SIGNIFICANT CHANGE UP (ref 40–120)
ALT FLD-CCNC: 22 U/L DA — SIGNIFICANT CHANGE UP (ref 10–60)
ANION GAP SERPL CALC-SCNC: 8 MMOL/L — SIGNIFICANT CHANGE UP (ref 5–17)
APPEARANCE UR: CLEAR — SIGNIFICANT CHANGE UP
AST SERPL-CCNC: 30 U/L — SIGNIFICANT CHANGE UP (ref 10–40)
BACTERIA # UR AUTO: ABNORMAL /HPF
BASOPHILS # BLD AUTO: 0.09 K/UL — SIGNIFICANT CHANGE UP (ref 0–0.2)
BASOPHILS NFR BLD AUTO: 1.1 % — SIGNIFICANT CHANGE UP (ref 0–2)
BILIRUB SERPL-MCNC: 0.5 MG/DL — SIGNIFICANT CHANGE UP (ref 0.2–1.2)
BILIRUB UR-MCNC: NEGATIVE — SIGNIFICANT CHANGE UP
BUN SERPL-MCNC: 21 MG/DL — HIGH (ref 7–18)
CALCIUM SERPL-MCNC: 9.3 MG/DL — SIGNIFICANT CHANGE UP (ref 8.4–10.5)
CHLORIDE SERPL-SCNC: 103 MMOL/L — SIGNIFICANT CHANGE UP (ref 96–108)
CO2 SERPL-SCNC: 27 MMOL/L — SIGNIFICANT CHANGE UP (ref 22–31)
COLOR SPEC: YELLOW — SIGNIFICANT CHANGE UP
CREAT SERPL-MCNC: 0.66 MG/DL — SIGNIFICANT CHANGE UP (ref 0.5–1.3)
DIFF PNL FLD: ABNORMAL
EGFR: 87 ML/MIN/1.73M2 — SIGNIFICANT CHANGE UP
EOSINOPHIL # BLD AUTO: 0.1 K/UL — SIGNIFICANT CHANGE UP (ref 0–0.5)
EOSINOPHIL NFR BLD AUTO: 1.2 % — SIGNIFICANT CHANGE UP (ref 0–6)
EPI CELLS # UR: ABNORMAL /HPF
ETHANOL SERPL-MCNC: 154 MG/DL — HIGH (ref 0–10)
GLUCOSE SERPL-MCNC: 96 MG/DL — SIGNIFICANT CHANGE UP (ref 70–99)
GLUCOSE UR QL: NEGATIVE — SIGNIFICANT CHANGE UP
HCT VFR BLD CALC: 38 % — SIGNIFICANT CHANGE UP (ref 34.5–45)
HGB BLD-MCNC: 12.7 G/DL — SIGNIFICANT CHANGE UP (ref 11.5–15.5)
IMM GRANULOCYTES NFR BLD AUTO: 0.2 % — SIGNIFICANT CHANGE UP (ref 0–1.5)
KETONES UR-MCNC: NEGATIVE — SIGNIFICANT CHANGE UP
LEUKOCYTE ESTERASE UR-ACNC: NEGATIVE — SIGNIFICANT CHANGE UP
LYMPHOCYTES # BLD AUTO: 1.97 K/UL — SIGNIFICANT CHANGE UP (ref 1–3.3)
LYMPHOCYTES # BLD AUTO: 23.9 % — SIGNIFICANT CHANGE UP (ref 13–44)
MAGNESIUM SERPL-MCNC: 1.7 MG/DL — SIGNIFICANT CHANGE UP (ref 1.6–2.6)
MCHC RBC-ENTMCNC: 30 PG — SIGNIFICANT CHANGE UP (ref 27–34)
MCHC RBC-ENTMCNC: 33.4 GM/DL — SIGNIFICANT CHANGE UP (ref 32–36)
MCV RBC AUTO: 89.8 FL — SIGNIFICANT CHANGE UP (ref 80–100)
MONOCYTES # BLD AUTO: 0.57 K/UL — SIGNIFICANT CHANGE UP (ref 0–0.9)
MONOCYTES NFR BLD AUTO: 6.9 % — SIGNIFICANT CHANGE UP (ref 2–14)
NEUTROPHILS # BLD AUTO: 5.48 K/UL — SIGNIFICANT CHANGE UP (ref 1.8–7.4)
NEUTROPHILS NFR BLD AUTO: 66.7 % — SIGNIFICANT CHANGE UP (ref 43–77)
NITRITE UR-MCNC: NEGATIVE — SIGNIFICANT CHANGE UP
NRBC # BLD: 0 /100 WBCS — SIGNIFICANT CHANGE UP (ref 0–0)
PH UR: 6.5 — SIGNIFICANT CHANGE UP (ref 5–8)
PLATELET # BLD AUTO: 288 K/UL — SIGNIFICANT CHANGE UP (ref 150–400)
POTASSIUM SERPL-MCNC: 3.2 MMOL/L — LOW (ref 3.5–5.3)
POTASSIUM SERPL-SCNC: 3.2 MMOL/L — LOW (ref 3.5–5.3)
PROT SERPL-MCNC: 7.2 G/DL — SIGNIFICANT CHANGE UP (ref 6–8.3)
PROT UR-MCNC: NEGATIVE — SIGNIFICANT CHANGE UP
RBC # BLD: 4.23 M/UL — SIGNIFICANT CHANGE UP (ref 3.8–5.2)
RBC # FLD: 15.5 % — HIGH (ref 10.3–14.5)
RBC CASTS # UR COMP ASSIST: ABNORMAL /HPF (ref 0–2)
SODIUM SERPL-SCNC: 138 MMOL/L — SIGNIFICANT CHANGE UP (ref 135–145)
SP GR SPEC: 1 — LOW (ref 1.01–1.02)
T4 AB SER-ACNC: 13 UG/DL — HIGH (ref 4.6–12)
TROPONIN I, HIGH SENSITIVITY RESULT: 14.5 NG/L — SIGNIFICANT CHANGE UP
TSH SERPL-MCNC: 1.88 UU/ML — SIGNIFICANT CHANGE UP (ref 0.34–4.82)
UROBILINOGEN FLD QL: NEGATIVE — SIGNIFICANT CHANGE UP
WBC # BLD: 8.23 K/UL — SIGNIFICANT CHANGE UP (ref 3.8–10.5)
WBC # FLD AUTO: 8.23 K/UL — SIGNIFICANT CHANGE UP (ref 3.8–10.5)
WBC UR QL: SIGNIFICANT CHANGE UP /HPF (ref 0–5)

## 2022-04-22 PROCEDURE — 84443 ASSAY THYROID STIM HORMONE: CPT

## 2022-04-22 PROCEDURE — 84436 ASSAY OF TOTAL THYROXINE: CPT

## 2022-04-22 PROCEDURE — 99285 EMERGENCY DEPT VISIT HI MDM: CPT | Mod: 25

## 2022-04-22 PROCEDURE — 36415 COLL VENOUS BLD VENIPUNCTURE: CPT

## 2022-04-22 PROCEDURE — 83735 ASSAY OF MAGNESIUM: CPT

## 2022-04-22 PROCEDURE — 99284 EMERGENCY DEPT VISIT MOD MDM: CPT

## 2022-04-22 PROCEDURE — 85025 COMPLETE CBC W/AUTO DIFF WBC: CPT

## 2022-04-22 PROCEDURE — 93010 ELECTROCARDIOGRAM REPORT: CPT

## 2022-04-22 PROCEDURE — 82962 GLUCOSE BLOOD TEST: CPT

## 2022-04-22 PROCEDURE — 84484 ASSAY OF TROPONIN QUANT: CPT

## 2022-04-22 PROCEDURE — 96360 HYDRATION IV INFUSION INIT: CPT

## 2022-04-22 PROCEDURE — 81001 URINALYSIS AUTO W/SCOPE: CPT

## 2022-04-22 PROCEDURE — 80307 DRUG TEST PRSMV CHEM ANLYZR: CPT

## 2022-04-22 PROCEDURE — 80053 COMPREHEN METABOLIC PANEL: CPT

## 2022-04-22 PROCEDURE — 93005 ELECTROCARDIOGRAM TRACING: CPT

## 2022-04-22 RX ORDER — SODIUM CHLORIDE 9 MG/ML
1000 INJECTION INTRAMUSCULAR; INTRAVENOUS; SUBCUTANEOUS ONCE
Refills: 0 | Status: COMPLETED | OUTPATIENT
Start: 2022-04-22 | End: 2022-04-22

## 2022-04-22 RX ORDER — SODIUM CHLORIDE 9 MG/ML
3 INJECTION INTRAMUSCULAR; INTRAVENOUS; SUBCUTANEOUS EVERY 8 HOURS
Refills: 0 | Status: DISCONTINUED | OUTPATIENT
Start: 2022-04-22 | End: 2022-04-25

## 2022-04-22 RX ORDER — POTASSIUM CHLORIDE 20 MEQ
40 PACKET (EA) ORAL ONCE
Refills: 0 | Status: COMPLETED | OUTPATIENT
Start: 2022-04-22 | End: 2022-04-22

## 2022-04-22 RX ADMIN — SODIUM CHLORIDE 1000 MILLILITER(S): 9 INJECTION INTRAMUSCULAR; INTRAVENOUS; SUBCUTANEOUS at 05:52

## 2022-04-22 RX ADMIN — SODIUM CHLORIDE 3 MILLILITER(S): 9 INJECTION INTRAMUSCULAR; INTRAVENOUS; SUBCUTANEOUS at 06:54

## 2022-04-22 RX ADMIN — Medication 40 MILLIEQUIVALENT(S): at 06:57

## 2022-04-22 RX ADMIN — SODIUM CHLORIDE 1000 MILLILITER(S): 9 INJECTION INTRAMUSCULAR; INTRAVENOUS; SUBCUTANEOUS at 07:22

## 2022-04-22 NOTE — ED ADULT TRIAGE NOTE - WEIGHT METHOD
The patient was counseled regarding the time commitment, risks, and benefits of  allergen immunotherapy and he wishes to proceed.  Reviewed immunotherapy packet with patient. Patient states understanding. Has no further questions. Patient signed the consent form for immunotherapy and was told that the Allergy Lab would contact patient once the serums arrive.    Writer demonstrated Epipen technique.  Informed that patient must pull blue end off of pen before use.  Hold firmly in one hand and press down into the thigh. The injection should go in the middle, outer thigh and hold for 10 seconds to ensure the delivery of all medication.  Informed that the needle can go through clothing but that any seams should be avoided. Patient advised that once used, needle will not be exposed, as it retracts back into the device. Patient was able to practice with the training device and demonstrated correct technique. Patient advised to call 911 or go to emergency department after Epipen use for further monitoring. Instructed to keep both pens together in case a second dose is needed. Instructed to keep Epipen out of extreme temperatures.  Check expiration date.  Do not use if    Patient verbalized understanding.    Writer demonstrated how to use an Auvi-Q Epinephrine auto-injector.  Patient instructed to remove cap from device and follow the instructions given by the recorded audio. This includes removing the red safety release by pulling straight out, then firmly pushing the black tip against outer thigh until it clicks, hold for 5 seconds.  Patient advised that once used, needle will not be exposed, as it retracts back into the device. Patient was able to practice with the training device and demonstrated correct technique. Patient advised to call 911 or go to emergency department after Auvi-Q use for further monitoring.       Patient would like to proceed with Auvi-Q.        Kriss Hamilton RN       stated

## 2022-04-22 NOTE — ED ADULT TRIAGE NOTE - CHIEF COMPLAINT QUOTE
S/P FALL C/O LOWER BACK PAIN /LEG PAIN /UNABLE TO GET UP FROM THE FLOOR AS PT STATED/DENIES LOC /PT A/O X 3 UPON ED ARRIVAL

## 2022-04-22 NOTE — ED PROVIDER NOTE - PHYSICAL EXAMINATION
Vital Signs Reviewed  GEN: Comfortable, NAD, AAOx3  HEENT: NCAT, MMM, Neck Supple  RESP: CTAB, No rales/rhonchi/wheezing  CV: RRR, S1S2, No murmurs  ABD: No TTP, Soft, ND, No masses, No CVA Tenderness  Extrem/Skin: No signs of trauma, Equal pulses bilat, No cyanosis/edema/rashes  Neuro: Moving all extremities, global weakness

## 2022-04-22 NOTE — ED PROVIDER NOTE - NSFOLLOWUPINSTRUCTIONS_ED_ALL_ED_FT
IMPORTANT INSTRUCTIONS FROM Dr. DESOUZA:    Please follow up with your personal medical doctor in 24-48 hours.   Bring results from today to your visit.    If you were advised to take any medications - be sure to review the package insert.    If your symptoms change, get worse or if you have any new symptoms, come to the ER right away.  If you have any questions, call the ER at the phone number on this page.

## 2022-04-22 NOTE — ED PROVIDER NOTE - CLINICAL SUMMARY MEDICAL DECISION MAKING FREE TEXT BOX
Pt p/w generalized weakness after taking a small amount of vodka. Exam is reassuring. EKG wnl. Labs pending. Pt stable. Will reassess. Pt p/w generalized weakness after taking a small amount of vodka. Exam is reassuring. EKG wnl. Labs pending. Pt stable. Will reassess.  See Progress Note.

## 2022-04-22 NOTE — ED ADULT NURSE NOTE - NSIMPLEMENTINTERV_GEN_ALL_ED
Implemented All Fall Risk Interventions:  [place] to call system. Call bell, personal items and telephone within reach. Instruct patient to call for assistance. Room bathroom lighting operational. Non-slip footwear when patient is off stretcher. Physically safe environment: no spills, clutter or unnecessary equipment. Stretcher in lowest position, wheels locked, appropriate side rails in place. Provide visual cue, wrist band, yellow gown, etc. Monitor gait and stability. Monitor for mental status changes and reorient to person, place, and time. Review medications for side effects contributing to fall risk. Reinforce activity limits and safety measures with patient and family.

## 2022-04-22 NOTE — ED PROVIDER NOTE - PROGRESS NOTE DETAILS
Labs showing etoh 154, K 3.2, and slight elevation of T4 and otw unremarkable. On reassessment pt states that she's feeling tired and wants to sleep. Given that pt has no signs of trauma, denies any trauma/pain, and already has reason for generalized weakness will not CT head nor pursue other imaging. Plan to s/o to incoming doc pending sobriety and improvement in weakness. Pt stable. endorsed to me. feeling better. amb at baseline status (slight assistance but uses walker). states has no back pain or other complaints.  reviewed case and results w pt, questions answered and pt understands, advised return precautions and care plan.

## 2022-04-22 NOTE — ED ADULT NURSE NOTE - OBJECTIVE STATEMENT
81 yo female lying on bed c/o lower back pain. Patient endorses weakness. As per patient, she woke up and walked to the bathroom and felt weak on her lower extremities. Patient denies fall, injury, or trauma.

## 2022-04-22 NOTE — ED PROVIDER NOTE - OBJECTIVE STATEMENT
81 yo F h/o HTN, HLD, hypothyroidism, parkinsons dz, multiple spine surgeries and bilat avascular necrosis p/w generalized weakness. Triage sheet incorrect as it states that she fell and has pain, pt denies both. Pt states that tonight she was having trouble falling asleep and had 2 shots of vodka. She then got up from bed and walked to the bathroom and when she returned to bed she sat down on the bed but was unable to lift her legs on to the bed because she felt too weak. Pt believes this is due to the etoh and lives alone. Pt denies any falls/injuries or pain/sitting on floor. Pt denies recent fever or infectious symptoms/ N/V/ diarrhea, dysuria or frequency/hesitancy, chest pain, SOB, focal numbness/weakness, syncope, other recent illness or hospitalizations.

## 2022-04-22 NOTE — ED PROVIDER NOTE - PATIENT PORTAL LINK FT
You can access the FollowMyHealth Patient Portal offered by [facility] by registering at the following website: [email or web address]. By joining [facility]’s FollowMyHealth portal, you will also be able to view your health information using other applications (apps) compatible with our system.

## 2022-04-29 ENCOUNTER — APPOINTMENT (OUTPATIENT)
Dept: ENDOCRINOLOGY | Facility: CLINIC | Age: 82
End: 2022-04-29
Payer: MEDICARE

## 2022-04-29 VITALS — WEIGHT: 118 LBS | TEMPERATURE: 97 F | HEIGHT: 57.8 IN | BODY MASS INDEX: 24.77 KG/M2

## 2022-04-29 DIAGNOSIS — R79.89 OTHER SPECIFIED ABNORMAL FINDINGS OF BLOOD CHEMISTRY: ICD-10-CM

## 2022-04-29 DIAGNOSIS — M85.80 OTHER SPECIFIED DISORDERS OF BONE DENSITY AND STRUCTURE, UNSPECIFIED SITE: ICD-10-CM

## 2022-04-29 PROCEDURE — 77080 DXA BONE DENSITY AXIAL: CPT

## 2022-04-29 PROCEDURE — 99214 OFFICE O/P EST MOD 30 MIN: CPT | Mod: 25

## 2022-04-29 PROCEDURE — ZZZZZ: CPT

## 2022-04-30 NOTE — ASSESSMENT
[Bisphosphonate Therapy] : Risks  and benefits of bisphosphonate therapy were  discussed with the patient including gastroesophageal irritation, osteonecrosis of the jaw, and atypical femur fractures, and acute phase reaction [Bisphosphonates] : The patient was instructed to take bisphosphonates on an empty stomach with a full glass of water,and wait at least 30 minutes before eating or lying down [FreeTextEntry1] : 82 y.o. female with h/o osteopenia, hypothyroidism s/p surgery for Graves disease, hyperlipidemia and vitamin D def.\par \par 1. Osteopenia- DEXA scan performed today shows stability with spine -1.0 stable with degenerative changes and 1/3 radius -1.8 with 2.3% increase. Patient is at increased risk of fracture. Fall precautions discussed. Encouraged weight bearing activity. Reviewed appropriate calcium and vitamin D intake. Will continue Alendronate 70 mg po weekly and will plan for drug holiday in 2024.  Will repeat DEXA scan in 2024.\par \par 2. Hypothyroidism- Patient is euthyroid. Will continue LT4 100 mcg daily 6 days per week and 1/2 tab once weekly\par \par 3. Hyperlipidemia- Will continue statin. \par \par 4. Vitamin D def- WIll continue supplement.\par \par Recommend follow up in 1 year and planning to establish endocrine care in [place] when she moves there this summer\par

## 2022-04-30 NOTE — HISTORY OF PRESENT ILLNESS
[FreeTextEntry1] : 82 y.o. female with h/o osteopenia and hypothyroidism s/p surgery for Graves disease and nodules here for follow up visit. H/o left ankle fracture in January 2015. Had posterior lumbar laminectomy L3-L5 and fusion of L4-L5 on 3/1/19. Had L3/4 laminectomy on 1/18/2020. Currently dealing with low back pain related to spondylolisthesis.  Also had left hip replacement on 5/5/19. Had right hip replacement on 3/2/2020 for avascular necrosis. Was doing PT. Doing better now. Does have falls given Parkinson's disease which is stable. Sees neurology every 6 months. Last fall in March 2022 but no fractures. Uses walker. Was taking Alendronate 70 mg weekly since June 2015 but stopped it in February 2019 prior to surgery. Restarted Alendronate 70 mg po weekly since October 2020. No dental issues and sees dentist. Off calcium supplement and now taking MVI and vitamin D ? IU daily. DEXA scan in May 2015 showed spine -1.4, femoral neck -2.4 and forearm -2.2. DEXA scan in June 2017 shows stability with spine -1.6, left femoral neck -2.2, total hip -1.8 and 1/3 radius -1.6. DEXA scan in October 2020 shows stability with spine -1.1 and 1/3 radius -2.0. \par \par Also has orthostatic hypotension and PAF and now being followed by cardiology. \par \par In regards to hypothyroidism,  taking LT4 100 mcg 6 days per week and 1/2 tab once weekly. No neck complaints.

## 2022-04-30 NOTE — PHYSICAL EXAM
[Alert] : alert [No Acute Distress] : no acute distress [Normal Sclera/Conjunctiva] : normal sclera/conjunctiva [EOMI] : extra ocular movement intact [No LAD] : no lymphadenopathy [No Respiratory Distress] : no respiratory distress [Clear to Auscultation] : lungs were clear to auscultation bilaterally [Normal S1, S2] : normal S1 and S2 [Regular Rhythm] : with a regular rhythm [Normal Bowel Sounds] : normal bowel sounds [Not Tender] : non-tender [Not Distended] : not distended [Soft] : abdomen soft [Normal Anterior Cervical Nodes] : no anterior cervical lymphadenopathy [No Spinal Tenderness] : no spinal tenderness [Kyphosis] : kyphosis present [No Clubbing, Cyanosis] : no clubbing  or cyanosis of the fingernails [No Rash] : no rash [Normal Reflexes] : deep tendon reflexes were 2+ and symmetric [Normal Affect] : the affect was normal [Normal Mood] : the mood was normal [Well Healed Scar] : well healed scar [de-identified] : mild edema b/l

## 2022-04-30 NOTE — REVIEW OF SYSTEMS
[Joint Pain] : joint pain [Poor Balance] : poor balance [Negative] : Genitourinary [Fatigue] : no fatigue [Recent Weight Gain (___ Lbs)] : no recent weight gain [Recent Weight Loss (___ Lbs)] : no recent weight loss [Back Pain] : back pain [Dry Skin] : no dry skin [Hair Loss] : no hair loss [Polydipsia] : no polydipsia [Swelling] : no swelling

## 2022-05-05 ENCOUNTER — APPOINTMENT (OUTPATIENT)
Dept: ORTHOPEDIC SURGERY | Facility: CLINIC | Age: 82
End: 2022-05-05
Payer: MEDICARE

## 2022-05-05 VITALS — WEIGHT: 11 LBS | HEIGHT: 57 IN | BODY MASS INDEX: 2.37 KG/M2

## 2022-05-05 DIAGNOSIS — M51.36 OTHER INTERVERTEBRAL DISC DEGENERATION, LUMBAR REGION: ICD-10-CM

## 2022-05-05 PROCEDURE — 99214 OFFICE O/P EST MOD 30 MIN: CPT

## 2022-05-05 NOTE — HISTORY OF PRESENT ILLNESS
[Prolonged Standing] : worsened by prolonged standing [Standing] : worsened by standing [Walking] : worsened by walking [NSAIDs] : relieved by nonsteroidal anti-inflammatory drugs [Opioid Analgesics] : relieved by opioid analgesics [de-identified] : Ms. Edward presents to the office for a follow-up visit.  She had persistent diffuse right leg pain that is not improving.  She takes Tylenol as needed.  Symptoms have not really changed since her last visit. \par \par Pt is s/p  revision L34 laminectomy on 01/18/20, progressive spondylolisthesis L34 and stenosis. Pt presents today for f/u. Recently hospitalized for AFIB. Ambulating with walker, progressively worsening symptoms. Pt saw Dr Spicer, she refused ELE as t is afraid of having AVN. Pt sates symptoms are now present on RLE.  Pt states she has radiating pain to B/L thighs R>L, tingling on RLE when changing positions. Pt denies numbness or weakness on B/L LE.  Pt takes Lyrica  25mg, prior to this she was taking 100mg and Meloxicam 7.5mg .  Pt Uses cane to ambulate. Pt participates with PT, this provides no pain relief. Pt denies fever, chills, weight changes, loss of bladder control, bowel incontinence or urinary retention or saddle anesthesia. \par \par  [Ataxia] : no ataxia [Incontinence] : no incontinence [Loss of Dexterity] : good dexterity [Urinary Ret.] : no urinary retention [de-identified] : raising from chair, recumbency exacerbates pain

## 2022-05-05 NOTE — DISCUSSION/SUMMARY
[de-identified] : 80 yo female s/p lumbar surgery x 2, progression of lumbar stenosis and spondylolisthesis, recently exacerbated , and also history of AFIB, recommend conservative care. \par \par  Diagnosis, prognosis, natural history and treatment was discussed with patient. Patient was advised if the following symptoms develop: chills, fever, \par loss of bladder control, bowel incontinence or urinary retention, numbness/tingling or weakness is present in upper or lower extremities, to go to the nearest emergency room. This may be a new clinical condition not present at the time of the patient visit  that may lead to paralysis and/or death, Patient advised if the above symptoms developed to also call the office immediately to inform us and to go to the nearest emergency room.\par \par

## 2022-05-05 NOTE — PHYSICAL EXAM
[Walker] : ambulates with walker [] : Motor: [NL] : Motor strength of the right lower extremity was normal [Motor Strength Lower Extremities] : left (5/5) [UE/LE] : Sensory: Intact in bilateral upper & lower extremities [2+] : left brachioradialis 2+ [1+] : left patella 1+ [ALL] : dorsalis pedis, posterior tibial, femoral, popliteal, and radial 2+ and symmetric bilaterally [Normal] : Oriented to person, place, and time, insight and judgement were intact and the affect was normal [Collins's Sign] : negative Collins's sign [Pronator Drift] : negative pronator drift [SLR] : negative straight leg raise [de-identified] : Lumbar ROM:  limited, painful\par NTTP L spine and b/l paraspinals L region. \par Skin intact L spine. No rashes, ulcers, blisters. \par Well healed posterior incision\par \par  [de-identified] : 2 views AP and Lat of Lumbar Spine from [id number] . Read and dictated by Dr. Tee Patricia Board Certified Orthopaedic surgeon 12/2/2021 - L45 grade 1 spondylolisthesis\par \par EXAM: MR SPINE LUMBAR WAW IC\par \par \par PROCEDURE DATE: 11/17/2021\par \par \par \par INTERPRETATION: LUMBOSACRAL SPINE MRI\par \par CLINICAL INFORMATION: Lower back pain with worsening left lower extremity weakness. Prior lower back surgery in 2019. Pain for 18 months.\par TECHNIQUE: Multiplanar, multisequence MR imaging was obtained of the lumbosacral spine. Images were obtained before and after the administration of IV contrast.\par Contrast: Gadavist. Administered: 5.5 cc. Discarded: 2 cc.\par COMPARISON: Lumbar spine MRI dated 8/8/2020\par FINDINGS:\par \par DISC LEVEL EVALUATION:\par \par T10/T11: Evaluated only in the sagittal plane. No central canal or foraminal narrowing.\par T11/T12: Evaluated only in the sagittal plane. Minimal disc bulging. No central canal narrowing. Mild bilateral foraminal narrowing.\par T12/L1: Evaluated only in the sagittal plane. No central canal or foraminal narrowing.\par L1/L2: Mild facet degenerative change. Mild disc bulging asymmetric to the right. Mild right lateral recess narrowing. No central canal or foraminal narrowing.\par L2/L3: Posterior osseous ridging effacing ventral thecal sac. Mild bilateral lateral recess narrowing. Mild central canal narrowing. Moderate bilateral foraminal narrowing. Similar to prior study.\par L3/L4: Severe facet degenerative change. Anterolisthesis of L3 on L4 with uncovering the disc space. There is no moderate to severe central canal stenosis with severe bilateral foraminal narrowing. The central canal stenosis has progressed compared to prior study.\par L4/L5: Status post laminectomy with posterior decompression. There is clumping of the nerve roots centrally. Posterior osseous ridging contributing to mild to moderate left and mild right foraminal narrowing. No central canal stenosis. Unchanged.\par L5/S1: Moderate to severe facet degenerative changes small facet effusions. Mild osseous ridging. Moderate right foraminal narrowing. No central canal or lateral recess narrowing.\par \par SPINAL ALIGNMENT: Mild to moderate anterolisthesis of L3 on L4. Mild anterolisthesis of L4 on L5. Mild right lateral listhesis of L4 on L5. Straightening of the lumbar spine. Varying levels of disc space narrowing noted.\par DISTAL CORD AND CONUS: The spinal cord terminates at the L1-2 level. No abnormal signal seen in the conus. No significant epidural enhancement is noted. There is clumping of the nerve roots at the L4-5 level suggestive of arachnoiditis.\par SI JOINTS: Spurring and mild sclerosis of both lower sacroiliac joints.\par MARROW: Susceptibility artifact in the region of the hips in keeping with bilateral hip arthroplasties. Degenerative sclerosis surrounding the L3-4 disc space.\par PARASPINAL MUSCLE AND SOFT TISSUES: Surgical change and atrophy of the paraspinal musculature at the level of surgery at L4-5.\par INTRAABDOMINAL/INTRAPELVIC SOFT TISSUES: No abnormality.\par \par IMPRESSION:\par \par 1. L3-4: Moderate to severe central canal stenosis, worsened compared to prior study. Unchanged severe bilateral foraminal narrowing.\par 2. L4-5: Status post laminectomy with posterior decompression. Moderate left foraminal narrowing. 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 Additional multilevel spondylosis is similar to the prior exam.\par \par --- End of Report --\par \par \par \par \par STARLA VILLALBA MD; Attending Radiologist\par This document has been electronically signed. Nov 24 2021 10:26AM\par \par 2 views AP and Lat of Lumbar Spine from [id number] 09/22/20. Read and dictated by Dr. Tee Patricia Board Certified Orthopaedic surgeon L34 spondylolisthesis Grade 1 \par \par \par EXAM: MR SPINE LUMBAR WAW IC \par \par \par PROCEDURE DATE: 08/08/2020 \par \par \par \par INTERPRETATION: LUMBOSACRAL SPINE MRI \par \par CLINICAL INFORMATION: Left lower back and hip pain. \par TECHNIQUE: Multiplanar, multisequence MR imaging was obtained of the lumbosacral spine with and without the administration of intravenous contrast. 5.5 cc of Gadavist were administered intravenously. 2.0 cc were discarded. \par \par COMPARISON: Lumbar spine MRI 9/26/2019. \par \par FINDINGS: \par \par DISC LEVEL EVALUATION: \par \par L1/L2: No spinal canal or foraminal narrowing. \par L2/L3: Small disc bulge with superimposed right foraminal protrusion and bilateral facet arthrosis and thickening of the ligamentum flavum resulting mild spinal canal narrowing and mild bilateral foraminal narrowing, right greater than left. No significant change compared with prior exam. \par L3/L4: Status post decompression. Anterolisthesis with uncovering of the disc with small disc bulge, resulting moderate to severe bilateral foraminal narrowing, increased when compared with prior MRI. No spinal canal narrowing. \par L4/L5: Small diffuse disc bulge and mild facet arthrosis resulting in mild to moderate bilateral foraminal narrowing. No spinal canal narrowing. No change when compared with prior MRI. \par L5/S1: Bilateral facet arthrosis. Mild right foraminal narrowing. No spinal canal or left foraminal narrowing. \par \par SPINAL ALIGNMENT: Preservation of the vertebral body heights. There is grade 1 anterolisthesis of L3 on L4, which has increased when compared with prior MRI. There is grade 1 anterolisthesis of L4 and L5, not changed when compared with prior MRI. There is mild rightward lateral subluxation of L4 on L5. Degenerative endplate marrow edema at L3/L4. Status post laminectomies at L3/L4 and L4/L5. \par DISTAL CORD AND CONUS: Cord terminates at L1/L2. Cauda equina are unremarkable. \par SI JOINTS: Sacroiliac joints are intact. \par PARASPINAL MUSCLE AND SOFT TISSUES: Atrophy of the left iliopsoas muscle. Mild paraspinal muscle edema to the level of the sacrum. \par INTRAABDOMINAL/INTRAPELVIC SOFT TISSUES: Unremarkable. \par \par IMPRESSION: \par Status post laminectomies at L3/L4 and L4/L5. \par Increase in anterolisthesis of L3 on L4 with moderate to severe bilateral foraminal narrowing. No spinal canal narrowing at this level. \par Mild spinal canal and foraminal narrowing at L2/L3 and mild to moderate foraminal narrowing at L4/L5, unchanged from prior exam. \par \par \par QUENTIN GLASER M.D., ATTENDING RADIOLOGIST \par This document has been electronically signed. Aug 11 2020 10:32AM \par \par \par 2 views AP and Lat of LS Spine from [id number] . Read and dictated by Dr. Tee Patricia Board Certified orthopaedic surgeon 6/9/2020 L34 Spondylolisthesis grade 1 \par \par

## 2022-06-06 NOTE — OCCUPATIONAL THERAPY INITIAL EVALUATION ADULT - IMPAIRMENTS CONTRIBUTING IMPAIRED BED MOBILITY, REHAB EVAL
decreased ROM/decreased strength

## 2022-07-08 NOTE — DISCHARGE NOTE NURSING/CASE MANAGEMENT/SOCIAL WORK - HISTORY OF COVID-19 VACCINATION
What Type Of Note Output Would You Prefer (Optional)?: Standard Output Is The Patient Presenting As Previously Scheduled?: Yes How Severe Is Your Rash?: moderate Is This A New Presentation, Or A Follow-Up?: Rash Additional History: Patient states they spoke with primary care and they believe this rash to just be a part of patients psoriasis. Patient did not agree and scheduled an appt to see us. Patient states it looks similar to a sensitivity to the sun they get. Patient has been using triamcinolone to the affected areas and states that is why the rash is all flaky today. Patient states it started off as red bumps and very itchy. Yes

## 2022-07-18 ENCOUNTER — APPOINTMENT (OUTPATIENT)
Dept: INTERNAL MEDICINE | Facility: CLINIC | Age: 82
End: 2022-07-18

## 2022-07-18 VITALS
WEIGHT: 118 LBS | DIASTOLIC BLOOD PRESSURE: 60 MMHG | SYSTOLIC BLOOD PRESSURE: 120 MMHG | OXYGEN SATURATION: 98 % | HEART RATE: 68 BPM | RESPIRATION RATE: 14 BRPM | BODY MASS INDEX: 25.46 KG/M2 | TEMPERATURE: 97.1 F | HEIGHT: 57 IN

## 2022-07-18 DIAGNOSIS — I48.0 PAROXYSMAL ATRIAL FIBRILLATION: ICD-10-CM

## 2022-07-18 DIAGNOSIS — E03.9 HYPOTHYROIDISM, UNSPECIFIED: ICD-10-CM

## 2022-07-18 DIAGNOSIS — G20 PARKINSON'S DISEASE: ICD-10-CM

## 2022-07-18 DIAGNOSIS — E78.5 HYPERLIPIDEMIA, UNSPECIFIED: ICD-10-CM

## 2022-07-18 PROCEDURE — 99214 OFFICE O/P EST MOD 30 MIN: CPT | Mod: 25

## 2022-07-18 PROCEDURE — 36415 COLL VENOUS BLD VENIPUNCTURE: CPT

## 2022-07-18 NOTE — HISTORY OF PRESENT ILLNESS
[FreeTextEntry1] : FUV [de-identified] : RENETTA STALEY is an 83 y/o female with HTN, hypercholesterolemia, hypothyroidism (S/p total thyroidectomy), Parkinson's Disease, avascular necrosis of bilateral hips (s/p intraarticular steroid injections), OA (s/p left THR 5/2019, R THR 3/2/20), s/p lumbar laminectomy with fusion on 3/1/19, and s/p L3-4 right hemilaminotomy and discectomy 1/18/2020 here for a bp check and fuv.  She has been well overall.  Will be moving into [facility] in [place], 15 minutes away from where daughter is.\par \par RENETTA STALEY was hospitalized from 1/15 thru 1/19/22 for syncopal episode. The patient was found to be in a fib and started on metoprolol and lovenox. She was discharged in stable condition on metoprolol and eliquis. She is seeing cardiologist dr kylie gibson.  \par \par The patient has chronic back pain and was taking lyrica and medical marijuana, now not taking.  She has been using tylenol prn for pain. \par \par Sees neurologist Dr Correa, [facility].  Due for visit\par \par The patient is  with 2 children and 5 grandchildren. 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 Walks slowly with rolling walker. Nieces and nephews are local.   Daughter is now in [place].\par  \par

## 2022-07-18 NOTE — ASSESSMENT
[FreeTextEntry1] : Tdap utd\par COnsider covid 19 vaccine #4\par Continue current meds\par Cardio, neuro fuv\par f/u prn - will be establishing care with local drs in [place].

## 2022-07-18 NOTE — PHYSICAL EXAM
[No Acute Distress] : no acute distress [Well Nourished] : well nourished [Well Developed] : well developed [Well-Appearing] : well-appearing [No Lymphadenopathy] : no lymphadenopathy [Supple] : supple [No Respiratory Distress] : no respiratory distress  [No Accessory Muscle Use] : no accessory muscle use [Clear to Auscultation] : lungs were clear to auscultation bilaterally [Normal Rate] : normal rate  [Regular Rhythm] : with a regular rhythm [Normal S1, S2] : normal S1 and S2 [No Edema] : there was no peripheral edema [Alert and Oriented x3] : oriented to person, place, and time [de-identified] : walks with rolling walker.  Slow gait

## 2022-07-25 LAB
25(OH)D3 SERPL-MCNC: 38.9 NG/ML
ALBUMIN SERPL ELPH-MCNC: 4.9 G/DL
ALP BLD-CCNC: 56 U/L
ALT SERPL-CCNC: 12 U/L
ANION GAP SERPL CALC-SCNC: 12 MMOL/L
AST SERPL-CCNC: 28 U/L
BASOPHILS # BLD AUTO: 0.07 K/UL
BASOPHILS NFR BLD AUTO: 0.8 %
BILIRUB SERPL-MCNC: 0.3 MG/DL
BUN SERPL-MCNC: 28 MG/DL
CALCIUM SERPL-MCNC: 10.4 MG/DL
CHLORIDE SERPL-SCNC: 101 MMOL/L
CHOLEST SERPL-MCNC: 212 MG/DL
CO2 SERPL-SCNC: 26 MMOL/L
CREAT SERPL-MCNC: 0.83 MG/DL
EGFR: 70 ML/MIN/1.73M2
EOSINOPHIL # BLD AUTO: 0.09 K/UL
EOSINOPHIL NFR BLD AUTO: 1.1 %
ESTIMATED AVERAGE GLUCOSE: 131 MG/DL
GLUCOSE SERPL-MCNC: 133 MG/DL
HBA1C MFR BLD HPLC: 6.2 %
HCT VFR BLD CALC: 41 %
HDLC SERPL-MCNC: 65 MG/DL
HGB BLD-MCNC: 12.6 G/DL
IMM GRANULOCYTES NFR BLD AUTO: 0.2 %
LDLC SERPL CALC-MCNC: 113 MG/DL
LYMPHOCYTES # BLD AUTO: 1.64 K/UL
LYMPHOCYTES NFR BLD AUTO: 19.4 %
M TB IFN-G BLD-IMP: NEGATIVE
MAN DIFF?: NORMAL
MCHC RBC-ENTMCNC: 29.3 PG
MCHC RBC-ENTMCNC: 30.7 GM/DL
MCV RBC AUTO: 95.3 FL
MONOCYTES # BLD AUTO: 0.63 K/UL
MONOCYTES NFR BLD AUTO: 7.5 %
NEUTROPHILS # BLD AUTO: 6 K/UL
NEUTROPHILS NFR BLD AUTO: 71 %
NONHDLC SERPL-MCNC: 148 MG/DL
PLATELET # BLD AUTO: 302 K/UL
POTASSIUM SERPL-SCNC: 5.6 MMOL/L
PROT SERPL-MCNC: 7.4 G/DL
QUANTIFERON TB PLUS MITOGEN MINUS NIL: 2.75 IU/ML
QUANTIFERON TB PLUS NIL: 0.03 IU/ML
QUANTIFERON TB PLUS TB1 MINUS NIL: -0.01 IU/ML
QUANTIFERON TB PLUS TB2 MINUS NIL: -0.01 IU/ML
RBC # BLD: 4.3 M/UL
RBC # FLD: 14 %
SODIUM SERPL-SCNC: 138 MMOL/L
T4 FREE SERPL-MCNC: 1.6 NG/DL
TRIGL SERPL-MCNC: 175 MG/DL
TSH SERPL-ACNC: 2.44 UIU/ML
VIT B12 SERPL-MCNC: 806 PG/ML
WBC # FLD AUTO: 8.45 K/UL

## 2022-08-09 ENCOUNTER — NON-APPOINTMENT (OUTPATIENT)
Age: 82
End: 2022-08-09

## 2022-10-10 ENCOUNTER — RX RENEWAL (OUTPATIENT)
Age: 82
End: 2022-10-10

## 2022-10-10 RX ORDER — OMEPRAZOLE 20 MG/1
20 CAPSULE, DELAYED RELEASE ORAL
Qty: 90 | Refills: 3 | Status: ACTIVE | COMMUNITY
Start: 2022-03-22 | End: 1900-01-01

## 2022-10-10 RX ORDER — ATORVASTATIN CALCIUM 40 MG/1
40 TABLET, FILM COATED ORAL
Qty: 90 | Refills: 3 | Status: ACTIVE | COMMUNITY
Start: 2018-01-08 | End: 1900-01-01

## 2022-10-18 ENCOUNTER — RX RENEWAL (OUTPATIENT)
Age: 82
End: 2022-10-18

## 2022-11-09 NOTE — PRE-OP CHECKLIST - WEIGHT IN LBS
115 Wartpeel Counseling:  I discussed with the patient the risks of Wartpeel including but not limited to erythema, scaling, itching, weeping, crusting, and pain.

## 2022-11-21 ENCOUNTER — APPOINTMENT (OUTPATIENT)
Dept: INTERNAL MEDICINE | Facility: CLINIC | Age: 82
End: 2022-11-21

## 2023-01-25 ENCOUNTER — NON-APPOINTMENT (OUTPATIENT)
Age: 83
End: 2023-01-25

## 2023-02-03 NOTE — PATIENT PROFILE ADULT - NSPRESCRALCFREQ_GEN_A_NUR
Continue Regimen: Ketoconazole 2% cream BID\\nHC 2.5% cream BID PRN flares Detail Level: Zone Render In Strict Bullet Format?: No Plan to start compound chemo cream in the fall/winter. Never

## 2023-05-11 NOTE — H&P PST ADULT - ANESTHESIA, PREVIOUS REACTION, PROFILE
Denies family hx/none Metronidazole Counseling:  I discussed with the patient the risks of metronidazole including but not limited to seizures, nausea/vomiting, a metallic taste in the mouth, nausea/vomiting and severe allergy.

## 2023-07-05 NOTE — PRE-ANESTHESIA EVALUATION ADULT - RESPIRATORY RATE (BREATHS/MIN)
Caller: Carli Dubon    Relationship to patient: Self    Best call back number: [phone]    Chief complaint: RT KNEE SWOLLEN    Type of visit: FOLLOW UP    Requested date: ASAP      Additional notes:PATIENT RECENTLY WENT TO ED AND SHE WAS ADVISED TO CONTACT HER ORHTOPEDIC FOR POSSIBLE DRAINAGE. PLEASE ADVISE - DR. ABRAMS NOT AVAILABLE UNTIL SEPT   18

## 2023-07-10 NOTE — H&P ADULT - PROBLEM/PLAN-7
normal appearance , without tenderness upon palpation , no deformities , trachea midline , Thyroid normal size , no masses , thyroid nontender DISPLAY PLAN FREE TEXT

## 2023-08-22 NOTE — H&P PST ADULT - BP NONINVASIVE DIASTOLIC (MM HG)
Patient within window for thrombolytics.  Case evaluated by telemetry neurology and agreed that patient had a significant deficit given expressive aphasia and is a good candidate for thrombolytics.  At this time there is no contraindications for thrombolytics.  Pros and cons of treatment explained to patient and wife and they are aware of the risks including bleeding.  Thrombolytic dose given at 417 and patient has continued to be in observation in the emergency room since.  Patient's mental status aphasia have improved during ER stay after medication given. 72

## 2023-08-28 NOTE — CONSULT LETTER
[Dear  ___] : Dear  [unfilled], [I had the pleasure of evaluating your patient, [unfilled].] : I had the pleasure of evaluating your patient, [unfilled]. [Sincerely,] : Sincerely, [Please see my note below.] : Please see my note below. Applied [FreeTextEntry2] : LORI HOPPER\par  [FreeTextEntry3] : Bran Yu MD\par \par ________________________________________________\par [facility] : Hip/Knee Arthroplasty\par [address], [place] [address]\par (T) [phone]\par (F) [phone]

## 2023-09-11 NOTE — ASU PATIENT PROFILE, ADULT - TOBACCO USE
RN CM met with the patient and his sister Monserrat Cano at the bedside. The sitter was present. RN CM informed her of the patient's bed offer at [address] [place] [address]. RN CM called the patient's sister Sebastián Jason at [phone] but was unable to reach her by telephone. Additional referrals were sent to [facility], [facility], and [facility] of [address]. Former smoker

## 2023-09-14 ENCOUNTER — RX RENEWAL (OUTPATIENT)
Age: 83
End: 2023-09-14

## 2023-09-15 ASSESSMENT — HOOS JR
BENDING TO THE FLOOR TO PICK UP OBJECT: SEVERE
LYING IN BED (TURNING OVER, MAINTAINING HIP POSITION): MODERATE
WALKING ON UNEVEN SURFACE: MODERATE
RISING FROM SITTING: MODERATE
WALKING ON UNEVEN SURFACE: SEVERE
GOING UP OR DOWN STAIRS: EXTREME
HOOS JR RAW SCORE: 1
BENDING TO THE FLOOR TO PICK UP OBJECT: MODERATE
GOING UP OR DOWN STAIRS: SEVERE
IMPORTED HOOS JR SCORE: 92.34
IMPORTED HOOS JR SCORE: 52.97
BENDING TO THE FLOOR TO PICK UP OBJECT: MILD
IMPORTED FORM: YES
LYING IN BED (TURNING OVER, MAINTAINING HIP POSITION): SEVERE
IMPORTED HOOS JR SCORE: 49.86
SITTING: MILD
HOOS JR RAW SCORE: 13
HOOS JR RAW SCORE: 12

## 2024-09-18 NOTE — CONSULT LETTER
[I had the pleasure of evaluating your patient, [unfilled].] : I had the pleasure of evaluating your patient, [unfilled]. [Dear  ___] : Dear  [unfilled], [Sincerely,] : Sincerely, [Please see my note below.] : Please see my note below. [FreeTextEntry3] : Bran Yu MD\par \par ________________________________________________\par [facility] : Hip/Knee Arthroplasty\par [address], [place] [address]\par (T) [phone]\par (F) [phone]  [FreeTextEntry2] : LORI HOPPER\par  Body Location Override (Optional - Billing Will Still Be Based On Selected Body Map Location If Applicable): Scattered Detail Level: Zone Size Of Lesion In Cm (Optional): 0 no

## 2025-01-06 NOTE — PHYSICAL THERAPY INITIAL EVALUATION ADULT - PLANNED THERAPY INTERVENTIONS, PT EVAL
balance training/bed mobility training/gait training/neuromuscular re-education/strengthening/transfer training 36.2

## 2025-04-23 NOTE — PRE-ANESTHESIA EVALUATION ADULT - NSANTHTIREDRD_ENT_A_CORE
No Initiate Treatment: Vtma cream ONCE DAILY Render In Strict Bullet Format?: No Samples Given: 2 boxes of vtama Detail Level: Zone Continue Regimen: Betamethasone dipropionate as needed Continue Regimen: Nystatin POWDER TID - patient has at home from PCP Detail Level: Simple